# Patient Record
Sex: FEMALE | Race: WHITE | NOT HISPANIC OR LATINO | Employment: UNEMPLOYED | URBAN - METROPOLITAN AREA
[De-identification: names, ages, dates, MRNs, and addresses within clinical notes are randomized per-mention and may not be internally consistent; named-entity substitution may affect disease eponyms.]

---

## 2017-01-02 ENCOUNTER — TELEPHONE (OUTPATIENT)
Dept: PSYCHIATRY | Facility: CLINIC | Age: 47
End: 2017-01-02

## 2017-01-02 NOTE — TELEPHONE ENCOUNTER
I called Karishma Kimball back to discuss process of admission to inpatient CD treatment. She denied SI/HI, stated she was currently on the phone with CD intake. I advised her to cancel her upcoming appointment with Dr. Jones if she was going to be inpatient.

## 2017-01-05 ENCOUNTER — HOSPITAL ENCOUNTER (OUTPATIENT)
Dept: BEHAVIORAL HEALTH | Facility: CLINIC | Age: 47
Discharge: HOME | End: 2017-01-05
Attending: SOCIAL WORKER | Admitting: SOCIAL WORKER
Payer: COMMERCIAL

## 2017-01-05 VITALS
HEART RATE: 101 BPM | DIASTOLIC BLOOD PRESSURE: 107 MMHG | SYSTOLIC BLOOD PRESSURE: 145 MMHG | BODY MASS INDEX: 20.66 KG/M2 | HEIGHT: 64 IN | WEIGHT: 121 LBS

## 2017-01-05 PROCEDURE — H0001 ALCOHOL AND/OR DRUG ASSESS: HCPCS

## 2017-01-05 ASSESSMENT — ANXIETY QUESTIONNAIRES
2. NOT BEING ABLE TO STOP OR CONTROL WORRYING: NOT AT ALL
5. BEING SO RESTLESS THAT IT IS HARD TO SIT STILL: NOT AT ALL
6. BECOMING EASILY ANNOYED OR IRRITABLE: NOT AT ALL
4. TROUBLE RELAXING: SEVERAL DAYS
1. FEELING NERVOUS, ANXIOUS, OR ON EDGE: SEVERAL DAYS
7. FEELING AFRAID AS IF SOMETHING AWFUL MIGHT HAPPEN: NOT AT ALL
GAD7 TOTAL SCORE: 2
3. WORRYING TOO MUCH ABOUT DIFFERENT THINGS: NOT AT ALL

## 2017-01-05 ASSESSMENT — PAIN SCALES - GENERAL: PAINLEVEL: NO PAIN (0)
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
## 2017-01-06 ASSESSMENT — PATIENT HEALTH QUESTIONNAIRE - PHQ9: SUM OF ALL RESPONSES TO PHQ QUESTIONS 1-9: 6

## 2017-01-06 ASSESSMENT — ANXIETY QUESTIONNAIRES: GAD7 TOTAL SCORE: 2
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
## 2017-02-17 ENCOUNTER — OFFICE VISIT (OUTPATIENT)
Dept: FAMILY MEDICINE | Facility: CLINIC | Age: 47
End: 2017-02-17
Payer: COMMERCIAL

## 2017-02-17 VITALS
BODY MASS INDEX: 22.02 KG/M2 | DIASTOLIC BLOOD PRESSURE: 80 MMHG | SYSTOLIC BLOOD PRESSURE: 111 MMHG | HEIGHT: 64 IN | TEMPERATURE: 98 F | HEART RATE: 81 BPM | WEIGHT: 129 LBS

## 2017-02-17 DIAGNOSIS — G47.00 INSOMNIA, UNSPECIFIED TYPE: Primary | ICD-10-CM

## 2017-02-17 DIAGNOSIS — Z97.5 IUD (INTRAUTERINE DEVICE) IN PLACE: ICD-10-CM

## 2017-02-17 DIAGNOSIS — F10.21 ALCOHOLISM IN RECOVERY (H): ICD-10-CM

## 2017-02-17 DIAGNOSIS — H69.90 DYSFUNCTION OF EUSTACHIAN TUBE, UNSPECIFIED LATERALITY: ICD-10-CM

## 2017-02-17 PROCEDURE — 99214 OFFICE O/P EST MOD 30 MIN: CPT | Performed by: PHYSICIAN ASSISTANT

## 2017-02-17 RX ORDER — QUETIAPINE FUMARATE 25 MG/1
25 TABLET, FILM COATED ORAL DAILY
COMMUNITY
Start: 2017-02-04 | End: 2017-02-17 | Stop reason: ALTCHOICE

## 2017-02-17 RX ORDER — TRIAMCINOLONE ACETONIDE 55 UG/1
1-2 SPRAY, METERED NASAL DAILY
Qty: 1 BOTTLE | Refills: 11 | Status: SHIPPED | OUTPATIENT
Start: 2017-02-17 | End: 2017-08-17

## 2017-02-17 NOTE — PROGRESS NOTES
"  SUBJECTIVE:                                                    Karishma Kimball is a 46 year old female who presents to clinic today for the following health issues:      * Was at [facility] for alcoholism,  was recently discharged.  Was prescribed a medication while there and is to have a blood test done to check levels.      Prescribed seroquel.  Only falls asleep if eats with the seroquel 25 mg.  This at 11 pm.  And doesn't feel like in a quality sleep and is hard to wake up.  Also doesn't feel rested.  Notes lifelong history of poor sleep.  Past meds - trazodone (hung over and didn't put to sleep), atarax.  Per recall thinks ambien didn't work.  Thinks was on doxepin in [facility] (felt awful).  Never tried elavil, lunesta.  Notes temazepam always worked. Would like to go back to this.  Did see sleep specialist in past, states had normal sleep study but this was on a night that she had taken her temazepam, and notes Dr Jones really wanted a repeat sleep study, so would like to see sleep specialist again.    Chronic ETD.  Has tried wax removal.    Sobriety - doing well so far.  Hasn't fully figured which AA meetings to attend, has list and is trying out several.  Optimistic.  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    Overdue on pap, and has mirena due for replacement by May.    Problem list and histories reviewed & adjusted, as indicated.  Additional history: as documented    Problem list, Medication list, Allergies, and Medical/Social/Surgical histories reviewed in [facility] and updated as appropriate.    ROS:  Constitutional, HEENT, cardiovascular, pulmonary, GI, , musculoskeletal, neuro, skin, endocrine and psych systems are negative, except as otherwise noted.    OBJECTIVE:                                                    /80 (BP Location: Right arm, Patient Position: Chair, Cuff Size: Adult Regular)  Pulse 81  Temp 98  F (36.7  C) (Tympanic)  Ht 5' 4\" (1.626 m)  Wt 129 lb (58.5 kg)  BMI 22.14 kg/m2  Body mass " index is 22.14 kg/(m^2).  GENERAL: healthy, alert and no distress  PSYCH: mentation appears normal, affect normal/bright       ASSESSMENT/PLAN:                                                        ICD-10-CM    1. Insomnia, unspecified type G47.00 SLEEP EVALUATION & MANAGEMENT REFERRAL - ADULT     amitriptyline (ELAVIL) 25 MG tablet   2. Alcoholism in recovery (H) F10.20 SLEEP EVALUATION & MANAGEMENT REFERRAL - ADULT   3. Dysfunction of eustachian tube, unspecified laterality H69.80 triamcinolone (NASACORT AQ) 55 MCG/ACT Inhaler   4. IUD (intrauterine device) in place Z97.5 OB/GYN REFERRAL     Patient Instructions   (G47.00) Insomnia, unspecified type  (primary encounter diagnosis)  Comment: since seroquel not working well, try amitriptyline instead.  Decided to set up appt with sleep specialist as well.  In meanwhile, call me if you want back on the seroquel.  Plan: SLEEP EVALUATION & MANAGEMENT REFERRAL - ADULT,        amitriptyline (ELAVIL) 25 MG tablet    (F10.20) Alcoholism in recovery (H)  Plan: keep working on finding all your support meetings    (H69.80) Dysfunction of eustachian tube, unspecified laterality  Comment: need to try this for several weeks  Plan: triamcinolone (NASACORT AQ) 55 MCG/ACT Inhaler    (Z97.5) IUD (intrauterine device) in place  Comment: overdue on pap.  Set up with GYN for physical and IUD replacement  Plan: OB/GYN REFERRAL      Call if questions          Dara Pope PA-C  [facility]

## 2017-02-17 NOTE — MR AVS SNAPSHOT
After Visit Summary   2/17/2017    Karishma Kimball    MRN: [id number]           Patient Information     Date Of Birth          1970        Visit Information        Provider Department      2/17/2017 8:40 AM Dara Pope PA-C [facility]        Today's Diagnoses     Insomnia, unspecified type    -  1    Alcoholism in recovery (H)        Dysfunction of eustachian tube, unspecified laterality        IUD (intrauterine device) in place          Care Instructions    (G47.00) Insomnia, unspecified type  (primary encounter diagnosis)  Comment: since seroquel not working well, try amitriptyline instead.  Decided to set up appt with sleep specialist as well.  In meanwhile, call me if you want back on the seroquel.  Plan: SLEEP EVALUATION & MANAGEMENT REFERRAL - ADULT,        amitriptyline (ELAVIL) 25 MG tablet    (F10.20) Alcoholism in recovery (H)  Plan: keep working on finding all your support meetings    (H69.80) Dysfunction of eustachian tube, unspecified laterality  Comment: need to try this for several weeks  Plan: triamcinolone (NASACORT AQ) 55 MCG/ACT Inhaler    (Z97.5) IUD (intrauterine device) in place  Comment: overdue on pap.  Set up with GYN for physical and IUD replacement  Plan: OB/GYN REFERRAL      Call if questions            Follow-ups after your visit        Additional Services     OB/GYN REFERRAL       Your provider has referred you to:  [facility]: [facility] [phone]   [email or web address]    Please be aware that coverage of these services is subject to the terms and limitations of your health insurance plan.  Call member services at your health plan with any benefit or coverage questions.      Please bring the following with you to your appointment:    (1) Any X-Rays, CTs or MRIs which have been performed.  Contact the facility where they were done to arrange for  prior to your scheduled appointment.   (2)  "List of current medications   (3) This referral request   (4) Any documents/labs given to you for this referral            SLEEP EVALUATION & MANAGEMENT REFERRAL - ADULT       Please be aware that coverage of these services is subject to the terms and limitations of your health insurance plan.  Call member services at your health plan with any benefit or coverage questions.      Please bring the following to your appointment:    >>   List of current medications   >>   This referral request   >>   Any documents/labs given to you for this referral    [facility] / Lab   [phone] (Age 2 and up)                  Future tests that were ordered for you today     Open Future Orders        Priority Expected Expires Ordered    SLEEP EVALUATION & MANAGEMENT REFERRAL - ADULT Routine  2/17/2018 2/17/2017            Who to contact     If you have questions or need follow up information about today's clinic visit or your schedule please contact [facility] directly at [phone].  Normal or non-critical lab and imaging results will be communicated to you by MyChart, letter or phone within 4 business days after the clinic has received the results. If you do not hear from us within 7 days, please contact the clinic through MyChart or phone. If you have a critical or abnormal lab result, we will notify you by phone as soon as possible.  Submit refill requests through [employer] or call your pharmacy and they will forward the refill request to us. Please allow 3 business days for your refill to be completed.          Additional Information About Your Visit        [employer] Information     [employer] lets you send messages to your doctor, view your test results, renew your prescriptions, schedule appointments and more. To sign up, go to [email or web address] . Click on \"Log in\" on the left side of the screen, which will take you to the Welcome page. Then click on \"Sign up Now\" on the right side " "of the page.     You will be asked to enter the access code listed below, as well as some personal information. Please follow the directions to create your username and password.     Your access code is: [email or web address]  Expires: 2017  9:29 AM     Your access code will  in 90 days. If you need help or a new code, please call your [place] clinic or [phone].        Care EveryWhere ID     This is your Care EveryWhere ID. This could be used by other organizations to access your [place] medical records  [id number]        Your Vitals Were     Pulse Temperature Height BMI (Body Mass Index)          81 98  F (36.7  C) (Tympanic) 5' 4\" (1.626 m) 22.14 kg/m2         Blood Pressure from Last 3 Encounters:   17 111/80   16 (!) 140/97   16 122/80    Weight from Last 3 Encounters:   17 129 lb (58.5 kg)   16 137 lb (62.1 kg)   16 149 lb 6.4 oz (67.8 kg)              We Performed the Following     OB/GYN REFERRAL          Today's Medication Changes          These changes are accurate as of: 17  9:29 AM.  If you have any questions, ask your nurse or doctor.               Start taking these medicines.        Dose/Directions    amitriptyline 25 MG tablet   Commonly known as:  ELAVIL   Used for:  Insomnia, unspecified type   Started by:  Dara Pope PA-C        Dose:  12.5-100 mg   Take 0.5-4 tablets (12.5-100 mg) by mouth At Bedtime   Quantity:  120 tablet   Refills:  1       triamcinolone 55 MCG/ACT Inhaler   Commonly known as:  NASACORT AQ   Used for:  Dysfunction of eustachian tube, unspecified laterality   Started by:  Dara Pope PA-C        Dose:  1-2 spray   Spray 1-2 sprays into both nostrils daily To help pressure in ears.   Quantity:  1 Bottle   Refills:  11         Stop taking these medicines if you haven't already. Please contact your care team if you have questions.     PROBIOTIC DAILY PO   Stopped by:  Dara Pope PA-C           " QUEtiapine 25 MG tablet   Commonly known as:  SEROquel   Stopped by:  Dara Pope PA-C           temazepam 15 MG capsule   Commonly known as:  RESTORIL   Stopped by:  Dara Pope PA-C           VITAMIN C PO   Stopped by:  Dara Pope PA-C           Vitamin D3 3000 UNITS Tabs   Stopped by:  Dara Pope PA-C           ZINC PO   Stopped by:  Dara Pope PA-C                Where to get your medicines      These medications were sent to [place] [id number]     Phone:  [phone]     amitriptyline 25 MG tablet    triamcinolone 55 MCG/ACT Inhaler                Primary Care Provider Office Phone # Fax #    Shantel Reynolds [phone]       [address] [address]        Thank you!     Thank you for choosing [facility]  for your care. Our goal is always to provide you with excellent care. Hearing back from our patients is one way we can continue to improve our services. Please take a few minutes to complete the written survey that you may receive in the mail after your visit with us. Thank you!             Your Updated Medication List - Protect others around you: Learn how to safely use, store and throw away your medicines at www.disposemymeds.org.          This list is accurate as of: 2/17/17  9:29 AM.  Always use your most recent med list.                   Brand Name Dispense Instructions for use    ** PATIENT ALERT **      MAXIMUM Ibuprofen FROM ALL SOURCES SHOULD NOT EXCEED 4000MG IN 24 HOURS       amitriptyline 25 MG tablet    ELAVIL    120 tablet    Take 0.5-4 tablets (12.5-100 mg) by mouth At Bedtime       triamcinolone 55 MCG/ACT Inhaler    NASACORT AQ    1 Bottle    Spray 1-2 sprays into both nostrils daily To help pressure in ears.

## 2017-02-17 NOTE — NURSING NOTE
"Chief Complaint   Patient presents with     Alcohol Problem     /80 (BP Location: Right arm, Patient Position: Chair, Cuff Size: Adult Regular)  Pulse 81  Temp 98  F (36.7  C) (Tympanic)  Ht 5' 4\" (1.626 m)  Wt 129 lb (58.5 kg)  BMI 22.14 kg/m2 Estimated body mass index is 22.14 kg/(m^2) as calculated from the following:    Height as of this encounter: 5' 4\" (1.626 m).    Weight as of this encounter: 129 lb (58.5 kg).  bp completed using cuff size: regular      Health Maintenance that is potentially due pending provider review:  NONE    Abida ANGELA MA      "

## 2017-02-17 NOTE — PATIENT INSTRUCTIONS
(G47.00) Insomnia, unspecified type  (primary encounter diagnosis)  Comment: since seroquel not working well, try amitriptyline instead.  Decided to set up appt with sleep specialist as well.  In meanwhile, call me if you want back on the seroquel.  Plan: SLEEP EVALUATION & MANAGEMENT REFERRAL - ADULT,        amitriptyline (ELAVIL) 25 MG tablet    (F10.20) Alcoholism in recovery (H)  Plan: keep working on finding all your support meetings    (H69.80) Dysfunction of eustachian tube, unspecified laterality  Comment: need to try this for several weeks  Plan: triamcinolone (NASACORT AQ) 55 MCG/ACT Inhaler    (Z97.5) IUD (intrauterine device) in place  Comment: overdue on pap.  Set up with GYN for physical and IUD replacement  Plan: OB/GYN REFERRAL      Call if questions

## 2017-02-23 ENCOUNTER — HOSPITAL ENCOUNTER (OUTPATIENT)
Dept: MAMMOGRAPHY | Facility: CLINIC | Age: 47
Discharge: HOME | End: 2017-02-23
Attending: PHYSICIAN ASSISTANT | Admitting: PHYSICIAN ASSISTANT
Payer: COMMERCIAL

## 2017-02-23 DIAGNOSIS — Z12.31 VISIT FOR SCREENING MAMMOGRAM: ICD-10-CM

## 2017-02-23 PROCEDURE — 77063 BREAST TOMOSYNTHESIS BI: CPT

## 2017-04-03 ENCOUNTER — OFFICE VISIT (OUTPATIENT)
Dept: FAMILY MEDICINE | Facility: CLINIC | Age: 47
End: 2017-04-03
Payer: COMMERCIAL

## 2017-04-03 VITALS
TEMPERATURE: 98 F | WEIGHT: 130 LBS | DIASTOLIC BLOOD PRESSURE: 81 MMHG | HEART RATE: 110 BPM | BODY MASS INDEX: 22.2 KG/M2 | SYSTOLIC BLOOD PRESSURE: 125 MMHG | HEIGHT: 64 IN

## 2017-04-03 DIAGNOSIS — F10.21 ALCOHOLISM IN RECOVERY (H): ICD-10-CM

## 2017-04-03 DIAGNOSIS — S39.012A BACK STRAIN, INITIAL ENCOUNTER: Primary | ICD-10-CM

## 2017-04-03 DIAGNOSIS — G47.00 PERSISTENT INSOMNIA: Primary | Chronic | ICD-10-CM

## 2017-04-03 DIAGNOSIS — F33.0 MAJOR DEPRESSIVE DISORDER, RECURRENT EPISODE, MILD (H): Chronic | ICD-10-CM

## 2017-04-03 PROCEDURE — 99214 OFFICE O/P EST MOD 30 MIN: CPT | Performed by: PHYSICIAN ASSISTANT

## 2017-04-03 PROCEDURE — 99212 OFFICE O/P EST SF 10 MIN: CPT | Performed by: PHYSICIAN ASSISTANT

## 2017-04-03 RX ORDER — ESZOPICLONE 1 MG/1
1-3 TABLET, FILM COATED ORAL
Qty: 30 TABLET | Refills: 0 | Status: SHIPPED | OUTPATIENT
Start: 2017-04-03 | End: 2017-04-17

## 2017-04-03 NOTE — Clinical Note
Please populate vitals from non-work comp visit same day, refresh vitals section in objective documentation, and close chart

## 2017-04-03 NOTE — NURSING NOTE
"Chief Complaint   Patient presents with     Recheck Medication       Initial /81 (BP Location: Right arm, Patient Position: Chair, Cuff Size: Adult Regular)  Pulse 110  Temp 98  F (36.7  C) (Tympanic)  Ht 5' 4\" (1.626 m)  Wt 130 lb (59 kg)  BMI 22.31 kg/m2 Estimated body mass index is 22.31 kg/(m^2) as calculated from the following:    Height as of this encounter: 5' 4\" (1.626 m).    Weight as of this encounter: 130 lb (59 kg).  Medication Reconciliation: complete    Health Maintenance that is potentially due pending provider review:  NONE    Abida ANGELA MA        "

## 2017-04-03 NOTE — PROGRESS NOTES
"  SUBJECTIVE:                                                    Karishma Kimball is a 46 year old female who presents to clinic today for the following health issues:      Medication Followup of Amitriptyline    Taking Medication as prescribed: yes    Side Effects:  Not sure if it is a side effect, but feels \"hung over\" in the morning    Medication Helping Symptoms:  \" It's better than nothing\"     From my Feb note: \"Prescribed seroquel. Only falls asleep if eats with the seroquel 25 mg. This at 11 pm. And doesn't feel like in a quality sleep and is hard to wake up. Also doesn't feel rested.  Notes lifelong history of poor sleep.  Past meds - trazodone (hung over and didn't put to sleep), atarax. Per recall thinks ambien didn't work. Thinks was on doxepin in [facility] (felt awful).  Never tried elavil, lunesta.  Notes temazepam always worked. Would like to go back to this.  Did see sleep specialist in past, states had normal sleep study but this was on a night that she had taken her temazepam, and notes Dr Jones really wanted a repeat sleep study, so would like to see sleep specialist again.\"    TODAY - Taking elavil 100 mg.  Smaller doses didn't work.  Sometimes up until 3 am.  But if takes earlier, seems to sometimes wears off early in night.    Groggy all day.  Feels med is better than nothing, but not good.  Long history of insomnia since age 18 or so.  States her insomnia was not taken seriously by providers until age 43.  Still sober.  Averaging >1 AA meeting per day last month.  Mood doing well.  Denies needing any treatment at this time.  Past care with Parminder and Dr Jones.  Has not set up return to sleep specialist yet, disliked last provider.       Problem list and histories reviewed & adjusted, as indicated.  Additional history: as documented    Reviewed and updated as needed this visit by clinical staff       Reviewed and updated as needed this visit by Provider         ROS:  Constitutional, neuro, " "and psych systems are negative, except as otherwise noted.    OBJECTIVE:                                                    /81 (BP Location: Right arm, Patient Position: Chair, Cuff Size: Adult Regular)  Pulse 110  Temp 98  F (36.7  C) (Tympanic)  Ht 5' 4\" (1.626 m)  Wt 130 lb (59 kg)  BMI 22.31 kg/m2  Body mass index is 22.31 kg/(m^2).  GENERAL: healthy, alert and no distress  PSYCH: mentation appears normal, affect normal/bright     ASSESSMENT/PLAN:                                                        ICD-10-CM    1. Persistent insomnia G47.00 SLEEP EVALUATION & MANAGEMENT REFERRAL - ADULT     eszopiclone (LUNESTA) 1 MG TABS tablet   2. Alcoholism in recovery (H) F10.20    3. Major depressive disorder, recurrent episode, mild (H) F33.0      Pap overdue.  Per last visit, patient plans GYN appt by May when mirena due for replacement.  Patient Instructions   Trying different sleep medicine  Call to update me on which dose of lunesta works, or if wish to go back to the amitriptyline  Do still set up with sleep doctor - referral placed again, this time specifically with Dr Blake    Stay sober    Keep monitoring your moods        Dara Pope PA-C  [facility]    "

## 2017-04-03 NOTE — MR AVS SNAPSHOT
After Visit Summary   4/3/2017    Karishma Kimball    MRN: [id number]           Patient Information     Date Of Birth          1970        Visit Information        Provider Department      4/3/2017 6:00 PM Dara Pope PA-C [facility]        Today's Diagnoses     Persistent insomnia    -  1    Alcoholism in recovery (H)          Care Instructions    Trying different sleep medicine  Call to update me on which dose of lunesta works, or if wish to go back to the amitriptyline  Do still set up with sleep doctor - referral placed again, this time specifically with Dr Blake    Stay sober    Keep monitoring your moods          Follow-ups after your visit        Additional Services     SLEEP EVALUATION & MANAGEMENT REFERRAL - ADULT       Please be aware that coverage of these services is subject to the terms and limitations of your health insurance plan.  Call member services at your health plan with any benefit or coverage questions.      Please bring the following to your appointment:    >>   List of current medications   >>   This referral request   >>   Any documents/labs given to you for this referral    [facility]  Ph [phone] (Age 2 and up)                  Your next 10 appointments already scheduled     Apr 13, 2017 11:00 AM CDT   PHYSICAL with Radha Boateng MD   [facility]    [address] [facility] [address] [phone]              Future tests that were ordered for you today     Open Future Orders        Priority Expected Expires Ordered    SLEEP EVALUATION & MANAGEMENT REFERRAL - ADULT Routine  4/3/2018 4/3/2017            Who to contact     If you have questions or need follow up information about today's clinic visit or your schedule please contact [facility] directly at [phone].  Normal or non-critical lab and imaging results will  "be communicated to you by Hell[employer] Mediahart, letter or phone within 4 business days after the clinic has received the results. If you do not hear from us within 7 days, please contact the clinic through Feedsky or phone. If you have a critical or abnormal lab result, we will notify you by phone as soon as possible.  Submit refill requests through Feedsky or call your pharmacy and they will forward the refill request to us. Please allow 3 business days for your refill to be completed.          Additional Information About Your Visit        Feedsky Information     Feedsky lets you send messages to your doctor, view your test results, renew your prescriptions, schedule appointments and more. To sign up, go to [email or web address] . Click on \"Log in\" on the left side of the screen, which will take you to the Welcome page. Then click on \"Sign up Now\" on the right side of the page.     You will be asked to enter the access code listed below, as well as some personal information. Please follow the directions to create your username and password.     Your access code is: [email or web address]  Expires: 2017 10:29 AM     Your access code will  in 90 days. If you need help or a new code, please call your [place] clinic or [phone].        Care EveryWhere ID     This is your Care EveryWhere ID. This could be used by other organizations to access your [place] medical records  [id number]        Your Vitals Were     Pulse Temperature Height BMI (Body Mass Index)          110 98  F (36.7  C) (Tympanic) 5' 4\" (1.626 m) 22.31 kg/m2         Blood Pressure from Last 3 Encounters:   17 125/81   17 111/80   17 (!) 145/107    Weight from Last 3 Encounters:   17 130 lb (59 kg)   17 129 lb (58.5 kg)   17 121 lb (54.9 kg)                 Today's Medication Changes          These changes are accurate as of: 4/3/17  6:24 PM.  If you have any questions, ask your nurse or doctor.               Start taking " these medicines.        Dose/Directions    eszopiclone 1 MG Tabs tablet   Commonly known as:  LUNESTA   Used for:  Persistent insomnia   Started by:  Dara Pope PA-C        Dose:  1-3 mg   Take 1-3 tablets (1-3 mg) by mouth nightly as needed for sleep Start with lowest dose tonight, then if needed 2 mg tomorrow, and if still not sleeping, 3 mg next next.  Update Dara which dose works for refill.   Quantity:  30 tablet   Refills:  0         Stop taking these medicines if you haven't already. Please contact your care team if you have questions.     amitriptyline 25 MG tablet   Commonly known as:  ELAVIL   Stopped by:  Dara Pope PA-C                Where to get your medicines      Some of these will need a paper prescription and others can be bought over the counter.  Ask your nurse if you have questions.     Bring a paper prescription for each of these medications     eszopiclone 1 MG Tabs tablet                Primary Care Provider Office Phone # Fax #    Dara Pope PA-C [phone]       [facility] [address] [place] [address]        Thank you!     Thank you for choosing [facility]  for your care. Our goal is always to provide you with excellent care. Hearing back from our patients is one way we can continue to improve our services. Please take a few minutes to complete the written survey that you may receive in the mail after your visit with us. Thank you!             Your Updated Medication List - Protect others around you: Learn how to safely use, store and throw away your medicines at www.disposemymeds.org.          This list is accurate as of: 4/3/17  6:24 PM.  Always use your most recent med list.                   Brand Name Dispense Instructions for use    ** PATIENT ALERT **      MAXIMUM Ibuprofen FROM ALL SOURCES SHOULD NOT EXCEED 4000MG IN 24 HOURS       eszopiclone 1 MG Tabs tablet    LUNESTA    30 tablet    Take  1-3 tablets (1-3 mg) by mouth nightly as needed for sleep Start with lowest dose tonight, then if needed 2 mg tomorrow, and if still not sleeping, 3 mg next next.  Update Dara which dose works for refill.       triamcinolone 55 MCG/ACT Inhaler    NASACORT AQ    1 Bottle    Spray 1-2 sprays into both nostrils daily To help pressure in ears.

## 2017-04-03 NOTE — PATIENT INSTRUCTIONS
Trying different sleep medicine  Call to update me on which dose of lunesta works, or if wish to go back to the amitriptyline  Do still set up with sleep doctor - referral placed again, this time specifically with Dr Blake    Stay sober    Keep monitoring your moods

## 2017-04-03 NOTE — MR AVS SNAPSHOT
"              After Visit Summary   4/3/2017    Karishma Kimball    MRN: [id number]           Patient Information     Date Of Birth          1970        Visit Information        Provider Department      4/3/2017 6:20 PM Dara Pope PA-C [facility]        Today's Diagnoses     Back strain, initial encounter    -  1      Care Instructions    Stretching  Heat or ice if needed  Acetaminophen or ibuprofen  If not improving in 1-2 weeks, will have you do Physical Therapy         Follow-ups after your visit        Your next 10 appointments already scheduled     Apr 13, 2017 11:00 AM CDT   PHYSICAL with Radha Boateng MD   [facility]    [address] [facility] [address] [phone]              Who to contact     If you have questions or need follow up information about today's clinic visit or your schedule please contact [facility] directly at [phone].  Normal or non-critical lab and imaging results will be communicated to you by Jobuloushart, letter or phone within 4 business days after the clinic has received the results. If you do not hear from us within 7 days, please contact the clinic through Jobuloushart or phone. If you have a critical or abnormal lab result, we will notify you by phone as soon as possible.  Submit refill requests through Wildfire or call your pharmacy and they will forward the refill request to us. Please allow 3 business days for your refill to be completed.          Additional Information About Your Visit        MyChart Information     Wildfire lets you send messages to your doctor, view your test results, renew your prescriptions, schedule appointments and more. To sign up, go to [email or web address] . Click on \"Log in\" on the left side of the screen, which will take you to the Welcome page. Then click on \"Sign up Now\" on the right side of the page.     You will be asked " "to enter the access code listed below, as well as some personal information. Please follow the directions to create your username and password.     Your access code is: [email or web address]  Expires: 2017 10:29 AM     Your access code will  in 90 days. If you need help or a new code, please call your [place] clinic or [phone].        Care EveryWhere ID     This is your Care EveryWhere ID. This could be used by other organizations to access your [place] medical records  [id number]        Your Vitals Were     Pulse Temperature Height BMI (Body Mass Index)          110 98  F (36.7  C) (Tympanic) 5' 4\" (1.626 m) 22.31 kg/m2         Blood Pressure from Last 3 Encounters:   17 125/81   17 125/81   17 111/80    Weight from Last 3 Encounters:   17 130 lb (59 kg)   17 130 lb (59 kg)   17 129 lb (58.5 kg)              Today, you had the following     No orders found for display         Today's Medication Changes          These changes are accurate as of: 4/3/17 11:59 PM.  If you have any questions, ask your nurse or doctor.               Start taking these medicines.        Dose/Directions    eszopiclone 1 MG Tabs tablet   Commonly known as:  LUNESTA   Used for:  Persistent insomnia   Started by:  Dara Pope PA-C        Dose:  1-3 mg   Take 1-3 tablets (1-3 mg) by mouth nightly as needed for sleep Start with lowest dose tonight, then if needed 2 mg tomorrow, and if still not sleeping, 3 mg next next.  Update Dara which dose works for refill.   Quantity:  30 tablet   Refills:  0         Stop taking these medicines if you haven't already. Please contact your care team if you have questions.     amitriptyline 25 MG tablet   Commonly known as:  ELAVIL   Stopped by:  Dara Pope PA-C                Where to get your medicines      Some of these will need a paper prescription and others can be bought over the counter.  Ask your nurse if you have questions.  "    Bring a paper prescription for each of these medications     eszopiclone 1 MG Tabs tablet                Primary Care Provider Office Phone # Fax #    Dara Pope PA-C [phone]       [facility] [address] [facility] [address]        Thank you!     Thank you for choosing [facility]  for your care. Our goal is always to provide you with excellent care. Hearing back from our patients is one way we can continue to improve our services. Please take a few minutes to complete the written survey that you may receive in the mail after your visit with us. Thank you!             Your Updated Medication List - Protect others around you: Learn how to safely use, store and throw away your medicines at www.disposemymeds.org.          This list is accurate as of: 4/3/17 11:59 PM.  Always use your most recent med list.                   Brand Name Dispense Instructions for use    ** PATIENT ALERT **      MAXIMUM Ibuprofen FROM ALL SOURCES SHOULD NOT EXCEED 4000MG IN 24 HOURS       eszopiclone 1 MG Tabs tablet    LUNESTA    30 tablet    Take 1-3 tablets (1-3 mg) by mouth nightly as needed for sleep Start with lowest dose tonight, then if needed 2 mg tomorrow, and if still not sleeping, 3 mg next next.  Update Dara which dose works for refill.       triamcinolone 55 MCG/ACT Inhaler    NASACORT AQ    1 Bottle    Spray 1-2 sprays into both nostrils daily To help pressure in ears.

## 2017-04-04 NOTE — PROGRESS NOTES
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  Focal pain, no weakness or radiating pain.    No history of prev back troubles aside from sacroiliac joint trouble once while pregnant.  Is lower left back.  Feels tight.      Problem list and histories reviewed & adjusted, as indicated.  Additional history: as documented    Reviewed and updated as needed this visit by clinical staff  Allergies  Meds  Problems       Reviewed and updated as needed this visit by Provider  Allergies  Meds  Problems       ROS:  Constitutional, musculoskeletal, neuro, systems are negative, except as otherwise noted.    OBJECTIVE:                                                    /81  Pulse 110  Temp 98  F (36.7  C) (Tympanic)  Ht 5' 4\" (1.626 m)  Wt 130 lb (59 kg)  BMI 22.31 kg/m2  Body mass index is 22.31 kg/(m^2).  GENERAL: healthy, alert and no distress  Back: Spine not tender to palpation.  Tender lower left over iliac and triangularly down towards coccyx  NEURO: Gait normal.      ASSESSMENT/PLAN:                                                        ICD-10-CM    1. Back strain, initial encounter S39.012A        Patient Instructions   Stretching  Heat or ice if needed  Acetaminophen or ibuprofen  If not improving in 1-2 weeks, will have you do Physical Therapy       Dara Pope PA-C  [facility]    "

## 2017-04-04 NOTE — PATIENT INSTRUCTIONS
Stretching  Heat or ice if needed  Acetaminophen or ibuprofen  If not improving in 1-2 weeks, will have you do Physical Therapy

## 2017-04-06 VITALS
HEIGHT: 64 IN | HEART RATE: 110 BPM | BODY MASS INDEX: 22.2 KG/M2 | WEIGHT: 130 LBS | SYSTOLIC BLOOD PRESSURE: 125 MMHG | DIASTOLIC BLOOD PRESSURE: 81 MMHG | TEMPERATURE: 98 F

## 2017-04-06 NOTE — NURSING NOTE
"Chief Complaint   Patient presents with     Work Comp       Initial /81  Pulse 110  Temp 98  F (36.7  C) (Tympanic)  Ht 5' 4\" (1.626 m)  Wt 130 lb (59 kg)  BMI 22.31 kg/m2 Estimated body mass index is 22.31 kg/(m^2) as calculated from the following:    Height as of this encounter: 5' 4\" (1.626 m).    Weight as of this encounter: 130 lb (59 kg).  Medication Reconciliation: complete    Health Maintenance that is potentially due pending provider review:  NONE    Abida ANGELA MA        "

## 2017-04-13 ENCOUNTER — OFFICE VISIT (OUTPATIENT)
Dept: OBGYN | Facility: CLINIC | Age: 47
End: 2017-04-13
Payer: COMMERCIAL

## 2017-04-13 VITALS
SYSTOLIC BLOOD PRESSURE: 107 MMHG | WEIGHT: 130 LBS | HEIGHT: 64 IN | HEART RATE: 83 BPM | DIASTOLIC BLOOD PRESSURE: 75 MMHG | BODY MASS INDEX: 22.2 KG/M2

## 2017-04-13 DIAGNOSIS — Z30.433 ENCOUNTER FOR IUD REMOVAL AND REINSERTION: ICD-10-CM

## 2017-04-13 DIAGNOSIS — Z97.5 IUD (INTRAUTERINE DEVICE) IN PLACE: ICD-10-CM

## 2017-04-13 DIAGNOSIS — Z00.00 ROUTINE GENERAL MEDICAL EXAMINATION AT A HEALTH CARE FACILITY: Primary | ICD-10-CM

## 2017-04-13 PROCEDURE — 87624 HPV HI-RISK TYP POOLED RSLT: CPT | Performed by: OBSTETRICS & GYNECOLOGY

## 2017-04-13 PROCEDURE — G0145 SCR C/V CYTO,THINLAYER,RESCR: HCPCS | Performed by: OBSTETRICS & GYNECOLOGY

## 2017-04-13 PROCEDURE — 58300 INSERT INTRAUTERINE DEVICE: CPT | Performed by: OBSTETRICS & GYNECOLOGY

## 2017-04-13 PROCEDURE — 99386 PREV VISIT NEW AGE 40-64: CPT | Mod: 25 | Performed by: OBSTETRICS & GYNECOLOGY

## 2017-04-13 PROCEDURE — 76830 TRANSVAGINAL US NON-OB: CPT | Performed by: OBSTETRICS & GYNECOLOGY

## 2017-04-13 PROCEDURE — 58301 REMOVE INTRAUTERINE DEVICE: CPT | Performed by: OBSTETRICS & GYNECOLOGY
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
## 2017-04-13 NOTE — MR AVS SNAPSHOT
After Visit Summary   4/13/2017    Karishma Kimball    MRN: [id number]           Patient Information     Date Of Birth          1970        Visit Information        Provider Department      4/13/2017 11:00 AM Radha Boateng MD [facility]        Today's Diagnoses     Routine general medical examination at a health care facility    -  1    Encounter for IUD removal and reinsertion          Care Instructions      Preventive Health Recommendations  Female Ages 40 to 49    Yearly exam:     See your health care provider every year in order to  1. Review health changes.   2. Discuss preventive care.    3. Review your medicines if your doctor prescribed any.      Get a Pap test every three years (unless you have an abnormal result and your provider advises testing more often).      If you get Pap tests with HPV test, you only need to test every 5 years, unless you have an abnormal result. You do not need a Pap test if your uterus was removed (hysterectomy) and you have not had cancer.      You should be tested each year for STDs (sexually transmitted diseases), if you're at risk.       Ask your doctor if you should have a mammogram.      Have a colonoscopy (test for colon cancer) if someone in your family has had colon cancer or polyps before age 50.       Have a cholesterol test every 5 years.       Have a diabetes test (fasting glucose) after age 45. If you are at risk for diabetes, you should have this test every 3 years.    Shots: Get a flu shot each year. Get a tetanus shot every 10 years.     Nutrition:     Eat at least 5 servings of fruits and vegetables each day.    Eat whole-grain bread, whole-wheat pasta and brown rice instead of white grains and rice.    Talk to your provider about Calcium and Vitamin D.     Lifestyle    Exercise at least 150 minutes a week (an average of 30 minutes a day, 5 days a week). This will help you control your weight and prevent  "disease.    Limit alcohol to one drink per day.    No smoking.     Wear sunscreen to prevent skin cancer.    See your dentist every six months for an exam and cleaning.        Follow-ups after your visit        Who to contact     If you have questions or need follow up information about today's clinic visit or your schedule please contact [facility] directly at [phone].  Normal or non-critical lab and imaging results will be communicated to you by MyChart, letter or phone within 4 business days after the clinic has received the results. If you do not hear from us within 7 days, please contact the clinic through [employer] or phone. If you have a critical or abnormal lab result, we will notify you by phone as soon as possible.  Submit refill requests through [employer] or call your pharmacy and they will forward the refill request to us. Please allow 3 business days for your refill to be completed.          Additional Information About Your Visit        [employer] Information     [employer] lets you send messages to your doctor, view your test results, renew your prescriptions, schedule appointments and more. To sign up, go to [email or web address] . Click on \"Log in\" on the left side of the screen, which will take you to the Welcome page. Then click on \"Sign up Now\" on the right side of the page.     You will be asked to enter the access code listed below, as well as some personal information. Please follow the directions to create your username and password.     Your access code is: [email or web address]  Expires: 2017 10:29 AM     Your access code will  in 90 days. If you need help or a new code, please call your [facility] clinic or [phone].        Care EveryWhere ID     This is your Care EveryWhere ID. This could be used by other organizations to access your [facility] medical records  [id number]        Your Vitals Were     Pulse Height Breastfeeding? BMI (Body Mass Index)          83 5' 4\" (1.626 " m) No 22.31 kg/m2         Blood Pressure from Last 3 Encounters:   04/13/17 107/75   04/03/17 125/81   04/03/17 125/81    Weight from Last 3 Encounters:   04/13/17 130 lb (59 kg)   04/03/17 130 lb (59 kg)   04/03/17 130 lb (59 kg)              We Performed the Following     HPV High Risk Types DNA Cervical     INSERTION INTRAUTERINE DEVICE     Pap imaged thin layer screen with HPV - recommended age 30 - 65 years (select HPV order below)     REMOVE INTRAUTERINE DEVICE     US Pelvic Limited Portable          Today's Medication Changes          These changes are accurate as of: 4/13/17 11:56 AM.  If you have any questions, ask your nurse or doctor.               Start taking these medicines.        Dose/Directions    levonorgestrel 20 MCG/24HR IUD   Commonly known as:  MIRENA   Used for:  Encounter for IUD removal and reinsertion   Started by:  Radha Boateng MD        Dose:  1 each   1 each (20 mcg) by Intrauterine route once for 1 dose   Quantity:  1 each   Refills:  0            Where to get your medicines      Some of these will need a paper prescription and others can be bought over the counter.  Ask your nurse if you have questions.     You don't need a prescription for these medications     levonorgestrel 20 MCG/24HR IUD                Primary Care Provider Office Phone # Fax #    Dara Pope PA-C [phone]       [facility] [address] [place] [address]        Thank you!     Thank you for choosing [facility]  for your care. Our goal is always to provide you with excellent care. Hearing back from our patients is one way we can continue to improve our services. Please take a few minutes to complete the written survey that you may receive in the mail after your visit with us. Thank you!             Your Updated Medication List - Protect others around you: Learn how to safely use, store and throw away your medicines at  www.disposemymeds.org.          This list is accurate as of: 4/13/17 11:56 AM.  Always use your most recent med list.                   Brand Name Dispense Instructions for use    ** PATIENT ALERT **      MAXIMUM Ibuprofen FROM ALL SOURCES SHOULD NOT EXCEED 4000MG IN 24 HOURS       eszopiclone 1 MG Tabs tablet    LUNESTA    30 tablet    Take 1-3 tablets (1-3 mg) by mouth nightly as needed for sleep Start with lowest dose tonight, then if needed 2 mg tomorrow, and if still not sleeping, 3 mg next next.  Update Dara which dose works for refill.       levonorgestrel 20 MCG/24HR IUD    MIRENA    1 each    1 each (20 mcg) by Intrauterine route once for 1 dose       triamcinolone 55 MCG/ACT Inhaler    NASACORT AQ    1 Bottle    Spray 1-2 sprays into both nostrils daily To help pressure in ears.

## 2017-04-13 NOTE — NURSING NOTE
"Initial /75 (BP Location: Left arm, Patient Position: Chair, Cuff Size: Adult Regular)  Pulse 83  Ht 5' 4\" (1.626 m)  Wt 130 lb (59 kg)  Breastfeeding? No  BMI 22.31 kg/m2 Estimated body mass index is 22.31 kg/(m^2) as calculated from the following:    Height as of this encounter: 5' 4\" (1.626 m).    Weight as of this encounter: 130 lb (59 kg). .    Katharina Holman    "

## 2017-04-13 NOTE — LETTER
April 21, 2017    Karishma Kimball  [address] [facility] [place] [address]    Dear Karishma,  We are happy to inform you that your PAP smear result from 4/13/17 is normal.  We are now able to do a follow up test on PAP smears. The DNA test is for HPV (Human Papilloma Virus). Cervical cancer is closely linked with certain types of HPV. Your result showed no evidence of high risk HPV.  Therefore we recommend you return in 1-3 years for your next pap smear and HPV test.  You will still need to return to the clinic every year for an annual exam and other preventive tests.  Please contact the clinic at [phone] with any questions.  Sincerely,    Radha Boateng MD/kranthi

## 2017-04-17 DIAGNOSIS — G47.00 PERSISTENT INSOMNIA: Chronic | ICD-10-CM

## 2017-04-17 LAB
COPATH REPORT: NORMAL
PAP: NORMAL

## 2017-04-17 RX ORDER — ESZOPICLONE 3 MG/1
3 TABLET, FILM COATED ORAL
Qty: 30 TABLET | Refills: 1 | Status: SHIPPED | OUTPATIENT
Start: 2017-04-17 | End: 2017-06-05

## 2017-04-17 NOTE — TELEPHONE ENCOUNTER
Dara Pope, relayed your message below, patient will try the melatonin while on the lunesta. Patient is needing a refill for Lunesta and wants it for 3mg until she sees sleep specialist, medication is pended,  thank you.  Esha

## 2017-04-17 NOTE — TELEPHONE ENCOUNTER
Dara Pope, called the patient back and she has informed this nurse that she took the elavil last night as she had a bad night this week and worried that the Lunesta was not helping her. Had been taking the Lunesta since 4-3-17 and had average of 5 hours of sleep and gave up caffeine as well.  Reports sleep not consistent however and wondering why some nights she is getting only 3 hours per night. Patient reports overall the elavil has been better for her but wants more direction. Told the patient that the elavil was discontinued but patient states she had filled before seeing provider so had some left. Patient is wondering your recommendation which to take and how long to try. Please advise.  Esha

## 2017-04-17 NOTE — TELEPHONE ENCOUNTER
Reason for Call:  Medication questions    Detailed comments: Karishma has Rx's for both Elavil and Lunesta to take at [facility]. She doesn't take both but one or the other.  She takes 100 mg of the Elavil or 3mg of the Lunesta. She wonders if 100 mg is the max dose she can take of the Elavil.    Phone Number Patient can be reached at: Home number on file [phone] (home)    Best Time: anytime    Can we leave a detailed message on this number? YES    Call taken on 4/17/2017 at 10:10 AM by Sonya Diaz

## 2017-04-17 NOTE — TELEPHONE ENCOUNTER
None of the sleep meds seem to do very well for her.    We had dc elavil since was seeming to cause groggy all day and still not prolonged sleep at night.  Since groggy all day, I don't want to try higher dose than 100.  She could try 6 mg melatonin at bedtime or 1-2 hrs before bedtime consistently, to see if this would also help sleep while she tries the lunesta.  Does she want refill for 3 mg strength until she sees sleep specialist on 5/4?

## 2017-04-18 LAB
FINAL DIAGNOSIS: NORMAL
HPV HR 12 DNA CVX QL NAA+PROBE: NEGATIVE
HPV16 DNA SPEC QL NAA+PROBE: NEGATIVE
HPV18 DNA SPEC QL NAA+PROBE: NEGATIVE
SPECIMEN DESCRIPTION: NORMAL

## 2017-05-04 ENCOUNTER — TELEPHONE (OUTPATIENT)
Dept: SLEEP MEDICINE | Facility: CLINIC | Age: 47
End: 2017-05-04

## 2017-05-04 ENCOUNTER — OFFICE VISIT (OUTPATIENT)
Dept: SLEEP MEDICINE | Facility: CLINIC | Age: 47
End: 2017-05-04
Payer: COMMERCIAL

## 2017-05-04 VITALS
HEIGHT: 64 IN | WEIGHT: 132 LBS | SYSTOLIC BLOOD PRESSURE: 122 MMHG | BODY MASS INDEX: 22.53 KG/M2 | OXYGEN SATURATION: 98 % | HEART RATE: 101 BPM | DIASTOLIC BLOOD PRESSURE: 89 MMHG

## 2017-05-04 DIAGNOSIS — F41.1 GENERALIZED ANXIETY DISORDER: Chronic | ICD-10-CM

## 2017-05-04 DIAGNOSIS — F51.01 IDIOPATHIC INSOMNIA: Primary | ICD-10-CM

## 2017-05-04 DIAGNOSIS — F33.0 MAJOR DEPRESSIVE DISORDER, RECURRENT EPISODE, MILD (H): Chronic | ICD-10-CM

## 2017-05-04 DIAGNOSIS — G47.00 PERSISTENT INSOMNIA: Primary | Chronic | ICD-10-CM

## 2017-05-04 PROCEDURE — 99214 OFFICE O/P EST MOD 30 MIN: CPT | Performed by: FAMILY MEDICINE

## 2017-05-04 RX ORDER — AMITRIPTYLINE HYDROCHLORIDE 50 MG/1
TABLET ORAL
Qty: 90 TABLET | Refills: 11 | Status: SHIPPED | OUTPATIENT
Start: 2017-05-04 | End: 2018-07-02

## 2017-05-04 NOTE — PATIENT INSTRUCTIONS
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
## 2017-05-04 NOTE — MR AVS SNAPSHOT
After Visit Summary   5/4/2017    Karishma Kimball    MRN: [id number]           Patient Information     Date Of Birth          1970        Visit Information        Provider Department      5/4/2017 11:00 AM Prakash Blake MD [facility]        Today's Diagnoses     Idiopathic insomnia    -  1      Care Instructions      Your BMI is Body mass index is 22.65 kg/(m^2).  Weight management is a personal decision.  If you are interested in exploring weight loss strategies, the following discussion covers the approaches that may be successful. Body mass index (BMI) is one way to tell whether you are at a healthy weight, overweight, or obese. It measures your weight in relation to your height.  A BMI of 18.5 to 24.9 is in the healthy range. A person with a BMI of 25 to 29.9 is considered overweight, and someone with a BMI of 30 or greater is considered obese. More than two-thirds of American adults are considered overweight or obese.  Being overweight or obese increases the risk for further weight gain. Excess weight may lead to heart disease and diabetes.  Creating and following plans for healthy eating and physical activity may help you improve your health.  Weight control is part of healthy lifestyle and includes exercise, emotional health, and healthy eating habits. Careful eating habits lifelong are the mainstay of weight control. Though there are significant health benefits from weight loss, long-term weight loss with diet alone may be very difficult to achieve- studies show long-term success with dietary management in less than 10% of people. Attaining a healthy weight may be especially difficult to achieve in those with severe obesity. In some cases, medications, devices and surgical management might be considered.  What can you do?  If you are overweight or obese and are interested in methods for weight loss, you should discuss this with your provider.     Consider  reducing daily calorie intake by 500 calories.     Keep a food journal.     Avoiding skipping meals, consider cutting portions instead.    Diet combined with exercise helps maintain muscle while optimizing fat loss. Strength training is particularly important for building and maintaining muscle mass. Exercise helps reduce stress, increase energy, and improves fitness. Increasing exercise without diet control, however, may not burn enough calories to loose weight.       Start walking three days a week 10-20 minutes at a time    Work towards walking thirty minutes five days a week     Eventually, increase the speed of your walking for 1-2 minutes at time    In addition, we recommend that you review healthy lifestyles and methods for weight loss available through the National Institutes of Health patient information sites:  http://win.niddk.nih.gov/publications/index.htm    And look into health and wellness programs that may be available through your health insurance provider, employer, local community center, or sury club.              Follow-ups after your visit        Follow-up notes from your care team     Return in about 4 weeks (around 6/1/2017), or if symptoms worsen or fail to improve.      Who to contact     If you have questions or need follow up information about today's clinic visit or your schedule please contact [facility] directly at [phone].  Normal or non-critical lab and imaging results will be communicated to you by MyChart, letter or phone within 4 business days after the clinic has received the results. If you do not hear from us within 7 days, please contact the clinic through MyChart or phone. If you have a critical or abnormal lab result, we will notify you by phone as soon as possible.  Submit refill requests through [employer] or call your pharmacy and they will forward the refill request to us. Please allow 3 business days for your refill to be completed.           "Additional Information About Your Visit        MyChart Information     [employer] lets you send messages to your doctor, view your test results, renew your prescriptions, schedule appointments and more. To sign up, go to [email or web address] . Click on \"Log in\" on the left side of the screen, which will take you to the Welcome page. Then click on \"Sign up Now\" on the right side of the page.     You will be asked to enter the access code listed below, as well as some personal information. Please follow the directions to create your username and password.     Your access code is: [email or web address]  Expires: 2017 10:29 AM     Your access code will  in 90 days. If you need help or a new code, please call your [place] clinic or [phone].        Care EveryWhere ID     This is your Care EveryWhere ID. This could be used by other organizations to access your [facility] medical records  [id number]        Your Vitals Were     Pulse Height Pulse Oximetry BMI (Body Mass Index)          101 1.626 m (5' 4.02\") 98% 22.65 kg/m2         Blood Pressure from Last 3 Encounters:   17 122/89   17 107/75   17 125/81    Weight from Last 3 Encounters:   17 59.9 kg (132 lb)   17 59 kg (130 lb)   17 59 kg (130 lb)              Today, you had the following     No orders found for display         Today's Medication Changes          These changes are accurate as of: 17 11:59 PM.  If you have any questions, ask your nurse or doctor.               Start taking these medicines.        Dose/Directions    amitriptyline 50 MG tablet   Commonly known as:  ELAVIL   Used for:  Idiopathic insomnia   Started by:  Prakash Blake MD        Take 3 tablets by mouth at bed time.   Quantity:  90 tablet   Refills:  11            Where to get your medicines      These medications were sent to [facility] [place] [address]    "  Phone:  [phone]     amitriptyline 50 MG tablet                Primary Care Provider Office Phone # Fax #    Dara Pope PA-C [phone]       [facility] [address] [facility] [address]        Thank you!     Thank you for choosing [facility]  for your care. Our goal is always to provide you with excellent care. Hearing back from our patients is one way we can continue to improve our services. Please take a few minutes to complete the written survey that you may receive in the mail after your visit with us. Thank you!             Your Updated Medication List - Protect others around you: Learn how to safely use, store and throw away your medicines at www.disposemymeds.org.          This list is accurate as of: 5/4/17 11:59 PM.  Always use your most recent med list.                   Brand Name Dispense Instructions for use    ** PATIENT ALERT **      MAXIMUM Ibuprofen FROM ALL SOURCES SHOULD NOT EXCEED 4000MG IN 24 HOURS       amitriptyline 50 MG tablet    ELAVIL    90 tablet    Take 3 tablets by mouth at bed time.       eszopiclone 3 MG tablet    LUNESTA    30 tablet    Take 1 tablet (3 mg) by mouth nightly as needed for sleep       levonorgestrel 20 MCG/24HR IUD    MIRENA    1 each    1 each (20 mcg) by Intrauterine route once for 1 dose       triamcinolone 55 MCG/ACT Inhaler    NASACORT AQ    1 Bottle    Spray 1-2 sprays into both nostrils daily To help pressure in ears.

## 2017-06-01 RX ORDER — TEMAZEPAM 15 MG/1
CAPSULE ORAL
Qty: 60 CAPSULE | Refills: 5 | Status: SHIPPED | OUTPATIENT
Start: 2017-06-01 | End: 2018-01-31

## 2017-06-01 NOTE — TELEPHONE ENCOUNTER
Patient notified of information below. She needs an rx sent into [place] [facility] for the temazepam.

## 2017-06-01 NOTE — TELEPHONE ENCOUNTER
Patient called and left a voice mail that the medication is not helping her at all. She feels tired all day long. Does not think she is getting any REM sleep. Tired of being tired. Would like to know if you have any other ideas.

## 2017-06-01 NOTE — TELEPHONE ENCOUNTER
I would recommend a trial of Temazepam 15-30mg PO QHS, with starting the Temazepam, would start slow taper of the amitriptyline by 25mg-50mg per week.

## 2017-06-02 ENCOUNTER — TELEPHONE (OUTPATIENT)
Dept: FAMILY MEDICINE | Facility: CLINIC | Age: 47
End: 2017-06-02

## 2017-06-02 NOTE — TELEPHONE ENCOUNTER
Patient has tried all over the counter medications and warm liquids.  Patient was advised to be seen for evaluation.  Patient was scheduled with PCP.    Kallie WELCH RN

## 2017-06-02 NOTE — TELEPHONE ENCOUNTER
Reason for Call:  Other call back    Detailed comments: She has a cough from allergies that has been going on for 3 weeks.  She is wondering if she can get something or does she need to be seen?  Please advise.  She uses [facility]    Phone Number Patient can be reached at: Home number on file [phone] (home)    Best Time: any    Can we leave a detailed message on this number? YES    Call taken on 6/2/2017 at 11:50 AM by Renea Costa

## 2017-06-05 ENCOUNTER — OFFICE VISIT (OUTPATIENT)
Dept: FAMILY MEDICINE | Facility: CLINIC | Age: 47
End: 2017-06-05
Payer: COMMERCIAL

## 2017-06-05 VITALS
HEIGHT: 64 IN | HEART RATE: 115 BPM | BODY MASS INDEX: 22.61 KG/M2 | SYSTOLIC BLOOD PRESSURE: 122 MMHG | DIASTOLIC BLOOD PRESSURE: 77 MMHG | TEMPERATURE: 98 F | WEIGHT: 132.4 LBS

## 2017-06-05 DIAGNOSIS — J30.2 SEASONAL ALLERGIC RHINITIS, UNSPECIFIED ALLERGIC RHINITIS TRIGGER: Primary | ICD-10-CM

## 2017-06-05 DIAGNOSIS — F10.21 ALCOHOLISM IN RECOVERY (H): ICD-10-CM

## 2017-06-05 DIAGNOSIS — R05.9 COUGH: ICD-10-CM

## 2017-06-05 PROCEDURE — 99213 OFFICE O/P EST LOW 20 MIN: CPT | Performed by: PHYSICIAN ASSISTANT

## 2017-06-05 RX ORDER — MONTELUKAST SODIUM 10 MG/1
10 TABLET ORAL DAILY PRN
Qty: 30 TABLET | Refills: 1 | Status: SHIPPED | OUTPATIENT
Start: 2017-06-05 | End: 2017-08-17

## 2017-06-05 ASSESSMENT — ANXIETY QUESTIONNAIRES
5. BEING SO RESTLESS THAT IT IS HARD TO SIT STILL: NOT AT ALL
1. FEELING NERVOUS, ANXIOUS, OR ON EDGE: SEVERAL DAYS
3. WORRYING TOO MUCH ABOUT DIFFERENT THINGS: NOT AT ALL
7. FEELING AFRAID AS IF SOMETHING AWFUL MIGHT HAPPEN: NOT AT ALL
6. BECOMING EASILY ANNOYED OR IRRITABLE: SEVERAL DAYS
GAD7 TOTAL SCORE: 3
2. NOT BEING ABLE TO STOP OR CONTROL WORRYING: NOT AT ALL

## 2017-06-05 ASSESSMENT — PATIENT HEALTH QUESTIONNAIRE - PHQ9: 5. POOR APPETITE OR OVEREATING: SEVERAL DAYS

## 2017-06-05 NOTE — MR AVS SNAPSHOT
After Visit Summary   6/5/2017    Karishma Kimball    MRN: [id number]           Patient Information     Date Of Birth          1970        Visit Information        Provider Department      6/5/2017 2:40 PM Dara Pope PA-C [facility]        Today's Diagnoses     Seasonal allergic rhinitis, unspecified allergic rhinitis trigger    -  1      Care Instructions    I suggest adding sudafed to your allegra or zyrtec  But if leary of this or does not work, can do allegra or zyrtec as twice daily.  This is how it is prescribed by allergists or by [place], but is not FDA approved this way.  If that doesn't work, can try singulair, prescribed today - but somewhat unlikely to help - usually more for allergies with asthma.  If doesn't work, see allergist.  May want to set this up sooner, and can cancel if not needing the appt.    Generics are fine    Call if questions.  Be seen if cough doesn't go away with getting allergies better.          Follow-ups after your visit        Additional Services     ALLERGY/ASTHMA ADULT REFERRAL       Your provider has referred you to: JOHN: [facility] [phone] [email or web address]    Please be aware that coverage of these services is subject to the terms and limitations of your health insurance plan.  Call member services at your health plan with any benefit or coverage questions.      Please bring the following with you to your appointment:    (1) Any X-Rays, CTs or MRIs which have been performed.  Contact the facility where they were done to arrange for  prior to your scheduled appointment.    (2) List of current medications  (3) This referral request   (4) Any documents/labs given to you for this referral                  Who to contact     If you have questions or need follow up information about today's clinic visit or your schedule please contact [facility] directly  "at [phone].  Normal or non-critical lab and imaging results will be communicated to you by MyChart, letter or phone within 4 business days after the clinic has received the results. If you do not hear from us within 7 days, please contact the clinic through [employer] or phone. If you have a critical or abnormal lab result, we will notify you by phone as soon as possible.  Submit refill requests through [employer] or call your pharmacy and they will forward the refill request to us. Please allow 3 business days for your refill to be completed.          Additional Information About Your Visit        [employer] Information     [employer] lets you send messages to your doctor, view your test results, renew your prescriptions, schedule appointments and more. To sign up, go to [email or web address] . Click on \"Log in\" on the left side of the screen, which will take you to the Welcome page. Then click on \"Sign up Now\" on the right side of the page.     You will be asked to enter the access code listed below, as well as some personal information. Please follow the directions to create your username and password.     Your access code is: [email or web address]  Expires: 9/3/2017  3:08 PM     Your access code will  in 90 days. If you need help or a new code, please call your [place] clinic or [phone].        Care EveryWhere ID     This is your Care EveryWhere ID. This could be used by other organizations to access your [place] medical records  [id number]        Your Vitals Were     Pulse Temperature Height BMI (Body Mass Index)          115 98  F (36.7  C) (Tympanic) 5' 4.02\" (1.626 m) 22.72 kg/m2         Blood Pressure from Last 3 Encounters:   17 122/77   17 122/89   17 107/75    Weight from Last 3 Encounters:   17 132 lb 6.4 oz (60.1 kg)   17 132 lb (59.9 kg)   17 130 lb (59 kg)              We Performed the Following     ALLERGY/ASTHMA ADULT REFERRAL          Today's Medication Changes "          These changes are accurate as of: 6/5/17  3:10 PM.  If you have any questions, ask your nurse or doctor.               Start taking these medicines.        Dose/Directions    montelukast 10 MG tablet   Commonly known as:  SINGULAIR   Used for:  Seasonal allergic rhinitis, unspecified allergic rhinitis trigger   Started by:  Dara Pope PA-C        Dose:  10 mg   Take 1 tablet (10 mg) by mouth daily as needed   Quantity:  30 tablet   Refills:  1         Stop taking these medicines if you haven't already. Please contact your care team if you have questions.     eszopiclone 3 MG tablet   Commonly known as:  LUNESTA   Stopped by:  Dara Pope PA-C                Where to get your medicines      These medications were sent to [facility] [place] [address]     Phone:  [phone]     montelukast 10 MG tablet                Primary Care Provider Office Phone # Fax #    Dara Pope PA-C [phone]       [address] [address]        Thank you!     Thank you for choosing [facility]  for your care. Our goal is always to provide you with excellent care. Hearing back from our patients is one way we can continue to improve our services. Please take a few minutes to complete the written survey that you may receive in the mail after your visit with us. Thank you!             Your Updated Medication List - Protect others around you: Learn how to safely use, store and throw away your medicines at www.disposemymeds.org.          This list is accurate as of: 6/5/17  3:10 PM.  Always use your most recent med list.                   Brand Name Dispense Instructions for use    ** PATIENT ALERT **      MAXIMUM Ibuprofen FROM ALL SOURCES SHOULD NOT EXCEED 4000MG IN 24 HOURS       amitriptyline 50 MG tablet    ELAVIL    90 tablet    Take 3 tablets  by mouth at bed time.       levonorgestrel 20 MCG/24HR IUD    MIRENA    1 each    1 each (20 mcg) by Intrauterine route once for 1 dose       montelukast 10 MG tablet    SINGULAIR    30 tablet    Take 1 tablet (10 mg) by mouth daily as needed       temazepam 15 MG capsule    RESTORIL    60 capsule    Take 1-2 capsules by mouth 15-30 minutes before bed.  Start with 1 capsule, ok to increase to 2 capsules if residual insomnia after 3-4 days.       triamcinolone 55 MCG/ACT Inhaler    NASACORT AQ    1 Bottle    Spray 1-2 sprays into both nostrils daily To help pressure in ears.

## 2017-06-05 NOTE — PROGRESS NOTES
"  SUBJECTIVE:                                                    Karishma Kimball is a 46 year old female who presents to clinic today for the following health issues:      ALLERGIES      Duration: 1 month or more    Description:   Nasal congestion: YES  Sneezing: no   Red, itchy eyes: no     Accompanying signs and symptoms: Mostly coughing.  Cough went from rattling-wet cough, to dry.  Last night was worse, coughed until she vomited.    History (similar episodes/allergy testing): None    Precipitating or alleviating factors: None    Therapies tried and outcome: Allegra, robitussin, nasal allergy spray-OTC - triamcinolone     Feels sinus drainage that triggers cough  Feels ears and sinuses get pressure then drain  Constant tickle in throat  Frontal headache when coughs a lot   Has tried the above and overlapping the above x > 1 wk at max dose.    Benadryl keeps her awake.  Had similar cough last summer.  No history asthma.    Successfully continuing her recovery from substance abuse.    Problem list and histories reviewed & adjusted, as indicated.  Additional history: as documented    Reviewed and updated as needed this visit by clinical staff       Reviewed and updated as needed this visit by Provider         ROS:  Constitutional, HEENT, cardiovascular, pulmonary, psych systems are negative, except as otherwise noted.    OBJECTIVE:                                                    /77 (BP Location: Right arm, Patient Position: Chair, Cuff Size: Adult Regular)  Pulse 115  Temp 98  F (36.7  C) (Tympanic)  Ht 5' 4.02\" (1.626 m)  Wt 132 lb 6.4 oz (60.1 kg)  BMI 22.72 kg/m2  Body mass index is 22.72 kg/(m^2).  GENERAL: healthy, alert and no distress  EYES: Eyes grossly normal to inspection, conjunctivae and sclerae normal  HENT:  nose with mottled erythema and pallor and mouth without ulcers or lesions, but cobblestone throat  NECK: no adenopathy, no asymmetry, masses, or scars   RESP: lungs clear to " auscultation - no rales, rhonchi or wheezes       ASSESSMENT/PLAN:                                                        ICD-10-CM    1. Seasonal allergic rhinitis, unspecified allergic rhinitis trigger J30.2 ALLERGY/ASTHMA ADULT REFERRAL     montelukast (SINGULAIR) 10 MG tablet   2. Cough R05    3. Alcoholism in recovery (H) F10.20        Patient Instructions   I suggest adding sudafed to your allegra or zyrtec  But if leary of this or does not work, can do allegra or zyrtec as twice daily.  This is how it is prescribed by allergists or by [place], but is not FDA approved this way.  If that doesn't work, can try singulair, prescribed today - but somewhat unlikely to help - usually more for allergies with asthma.  If doesn't work, see allergist.  May want to set this up sooner, and can cancel if not needing the appt.    Generics are fine    Call if questions.  Be seen if cough doesn't go away with getting allergies better.      Dara Pope PA-C  [facility]

## 2017-06-05 NOTE — NURSING NOTE
"Chief Complaint   Patient presents with     Allergies       Initial /77 (BP Location: Right arm, Patient Position: Chair, Cuff Size: Adult Regular)  Pulse 115  Temp 98  F (36.7  C) (Tympanic)  Ht 5' 4.02\" (1.626 m)  Wt 132 lb 6.4 oz (60.1 kg)  BMI 22.72 kg/m2 Estimated body mass index is 22.72 kg/(m^2) as calculated from the following:    Height as of this encounter: 5' 4.02\" (1.626 m).    Weight as of this encounter: 132 lb 6.4 oz (60.1 kg).  Medication Reconciliation: complete    Health Maintenance that is potentially due pending provider review:  NONE    Abida ANGELA MA        "

## 2017-06-05 NOTE — PATIENT INSTRUCTIONS
I suggest adding sudafed to your allegra or zyrtec  But if leary of this or does not work, can do allegra or zyrtec as twice daily.  This is how it is prescribed by allergists or by [place], but is not FDA approved this way.  If that doesn't work, can try singulair, prescribed today - but somewhat unlikely to help - usually more for allergies with asthma.  If doesn't work, see allergist.  May want to set this up sooner, and can cancel if not needing the appt.    Generics are fine    Call if questions.  Be seen if cough doesn't go away with getting allergies better.

## 2017-06-06 ASSESSMENT — PATIENT HEALTH QUESTIONNAIRE - PHQ9: SUM OF ALL RESPONSES TO PHQ QUESTIONS 1-9: 4

## 2017-06-06 ASSESSMENT — ANXIETY QUESTIONNAIRES: GAD7 TOTAL SCORE: 3

## 2017-06-22 ENCOUNTER — TELEPHONE (OUTPATIENT)
Dept: SLEEP MEDICINE | Facility: CLINIC | Age: 47
End: 2017-06-22

## 2017-06-22 NOTE — TELEPHONE ENCOUNTER
SUBJECTIVE:  Chief Complaint   Patient presents with     Medication Question     temazepam (RESTORIL) 15 MG capsule     Medication Question     amitriptyline (ELAVIL) 50 MG tablet      OBJECTIVE:  Karishma informed me that at her last office visit she was to ween off of amitriptyline and switch to temazepam. Last night was the first night she took Temazepam alone and she had frequent waking and felt restless during the night. She informed me that she doesn't have any caffeine past 11 am and is taking the doctors advise on night time behaviors. She wakes up at 7 am and is to work by 830 am. Usually she is tired during the day until 2 pm however, she would rather feel tired during the day than wake up during the night.      ASSESSMENT/PLAN:  Please review.

## 2017-06-22 NOTE — TELEPHONE ENCOUNTER
I would see if there was a dose of the amitriptyline during the taper that seemed to be at least somewhat helpful.  I feel it is reasonable to continue the Temazepam and consider a careful restart of the Amitriptyline, these would be safe to use together and can aim for the lowest effective dose.

## 2017-06-23 NOTE — TELEPHONE ENCOUNTER
Attempted to contact Karishma at phone number provided during the first contact. Left message on answering machine for Karishma to return call.

## 2017-06-23 NOTE — TELEPHONE ENCOUNTER
Karishma returned call and informed me that she feels taking two 15 mg temazepam and one 50 mg amitriptyline were an effective dose. She does not need refills at this time but will contact us when needed.

## 2017-06-30 ENCOUNTER — OFFICE VISIT (OUTPATIENT)
Dept: FAMILY MEDICINE | Facility: CLINIC | Age: 47
End: 2017-06-30
Payer: COMMERCIAL

## 2017-06-30 ENCOUNTER — RADIANT APPOINTMENT (OUTPATIENT)
Dept: GENERAL RADIOLOGY | Facility: CLINIC | Age: 47
End: 2017-06-30
Attending: PHYSICIAN ASSISTANT
Payer: COMMERCIAL

## 2017-06-30 VITALS
DIASTOLIC BLOOD PRESSURE: 84 MMHG | HEART RATE: 100 BPM | WEIGHT: 135 LBS | SYSTOLIC BLOOD PRESSURE: 116 MMHG | TEMPERATURE: 98.1 F | HEIGHT: 64 IN | BODY MASS INDEX: 23.05 KG/M2

## 2017-06-30 DIAGNOSIS — M54.12 CERVICAL RADICULOPATHY: Primary | ICD-10-CM

## 2017-06-30 DIAGNOSIS — G47.00 INSOMNIA, UNSPECIFIED TYPE: ICD-10-CM

## 2017-06-30 DIAGNOSIS — Z98.890 H/O NECK SURGERY: ICD-10-CM

## 2017-06-30 DIAGNOSIS — F10.21 ALCOHOLISM IN RECOVERY (H): ICD-10-CM

## 2017-06-30 DIAGNOSIS — M54.12 CERVICAL RADICULOPATHY: ICD-10-CM

## 2017-06-30 PROCEDURE — 99214 OFFICE O/P EST MOD 30 MIN: CPT | Performed by: PHYSICIAN ASSISTANT

## 2017-06-30 PROCEDURE — 72040 X-RAY EXAM NECK SPINE 2-3 VW: CPT

## 2017-06-30 NOTE — NURSING NOTE
"Chief Complaint   Patient presents with     Neck Pain       Initial /90 (BP Location: Right arm, Patient Position: Chair, Cuff Size: Adult Regular)  Pulse 100  Temp 98.1  F (36.7  C) (Tympanic)  Ht 5' 4.02\" (1.626 m)  Wt 135 lb (61.2 kg)  BMI 23.16 kg/m2 Estimated body mass index is 23.16 kg/(m^2) as calculated from the following:    Height as of this encounter: 5' 4.02\" (1.626 m).    Weight as of this encounter: 135 lb (61.2 kg).  Medication Reconciliation: complete    Health Maintenance that is potentially due pending provider review:  NONE    Abida ANGELA MA        "

## 2017-06-30 NOTE — MR AVS SNAPSHOT
"              After Visit Summary   6/30/2017    Karishma Kimball    MRN: [id number]           Patient Information     Date Of Birth          1970        Visit Information        Provider Department      6/30/2017 10:00 AM [facility]        Today's Diagnoses     Cervical radiculopathy    -  1      Care Instructions    Will start with Physical Therapy but low threshold for sending for MRI if not improving.  MRI already ordered.    Continue your substance recovery.            Follow-ups after your visit        Additional Services     PHYSICAL THERAPY REFERRAL       *This therapy referral will be filtered to a centralized scheduling office at [facility] and the patient will receive a call to schedule an appointment at a [place] location most convenient for them. *     [facility] provides Physical Therapy evaluation and treatment and many specialty services across the [place] system.  If requesting a specialty program, please choose from the list below.    If you have not heard from the scheduling office within 2 business days, please call [phone] for all locations, with the exception of [place], please call [phone].  Treatment: Evaluation & Treatment  Special Instructions/Modalities: will start with Physical Therapy but low threshold for sending for MRI if not improving.  MRI already ordered.  Special Programs: None    Please be aware that coverage of these services is subject to the terms and limitations of your health insurance plan.  Call member services at your health plan with any benefit or coverage questions.      **Note to Provider:  If you are referring outside of [place] for the therapy appointment, please list the name of the location in the \"special instructions\" above, print the referral and give to the patient to schedule the appointment.                  Future tests that were ordered for you today " "    Open Future Orders        Priority Expected Expires Ordered    XR Cervical Spine 2/3 Views Routine 2017    MR Cervical Spine w/o Contrast Routine  2018            Who to contact     If you have questions or need follow up information about today's clinic visit or your schedule please contact [facility] directly at [phone].  Normal or non-critical lab and imaging results will be communicated to you by [employer], letter or phone within 4 business days after the clinic has received the results. If you do not hear from us within 7 days, please contact the clinic through [employer] or phone. If you have a critical or abnormal lab result, we will notify you by phone as soon as possible.  Submit refill requests through [employer] or call your pharmacy and they will forward the refill request to us. Please allow 3 business days for your refill to be completed.          Additional Information About Your Visit        [employer] Information     [employer] lets you send messages to your doctor, view your test results, renew your prescriptions, schedule appointments and more. To sign up, go to [email or web address] . Click on \"Log in\" on the left side of the screen, which will take you to the Welcome page. Then click on \"Sign up Now\" on the right side of the page.     You will be asked to enter the access code listed below, as well as some personal information. Please follow the directions to create your username and password.     Your access code is: [email or web address]  Expires: 9/3/2017  3:08 PM     Your access code will  in 90 days. If you need help or a new code, please call your [place] clinic or [phone].        Care EveryWhere ID     This is your Care EveryWhere ID. This could be used by other organizations to access your [place] medical records  [id number]        Your Vitals Were     Pulse Temperature Height BMI (Body Mass Index)          100 98.1  F (36.7 " " C) (Tympanic) 5' 4.02\" (1.626 m) 23.16 kg/m2         Blood Pressure from Last 3 Encounters:   06/30/17 125/90   06/05/17 122/77   05/04/17 122/89    Weight from Last 3 Encounters:   06/30/17 135 lb (61.2 kg)   06/05/17 132 lb 6.4 oz (60.1 kg)   05/04/17 132 lb (59.9 kg)              We Performed the Following     PHYSICAL THERAPY REFERRAL          Today's Medication Changes          These changes are accurate as of: 6/30/17 10:26 AM.  If you have any questions, ask your nurse or doctor.               These medicines have changed or have updated prescriptions.        Dose/Directions    amitriptyline 50 MG tablet   Commonly known as:  ELAVIL   This may have changed:    - how much to take  - when to take this  - additional instructions   Used for:  Idiopathic insomnia        Take 3 tablets by mouth at bed time.   Quantity:  90 tablet   Refills:  11                Primary Care Provider Office Phone # Fax #    Dara Pope PA-C [phone]       [place] [id number] [address]        Equal Access to Services     KWADWO COULTER AH: Hadii melodie Velez, wamaria cda hortencia, qaybta kaalmada ademaria esther, bess melara. So [facility] [place] [phone].    ATENCIÓN: Si habla español, tiene a melton disposición servicios gratuitos de asistencia lingüística. [facility] [phone].    We comply with applicable federal civil rights laws and [place] laws. We do not discriminate on the basis of race, color, national origin, age, disability sex, sexual orientation or gender identity.            Thank you!     Thank you for choosing [facility]  for your care. Our goal is always to provide you with excellent care. Hearing back from our patients is one way we can continue to improve our services. Please take a few minutes to complete the written survey that you may receive in the mail after your visit with us. Thank you!             Your Updated " Medication List - Protect others around you: Learn how to safely use, store and throw away your medicines at www.disposemymeds.org.          This list is accurate as of: 6/30/17 10:26 AM.  Always use your most recent med list.                   Brand Name Dispense Instructions for use Diagnosis    ** PATIENT ALERT **      MAXIMUM Ibuprofen FROM ALL SOURCES SHOULD NOT EXCEED 4000MG IN 24 HOURS        amitriptyline 50 MG tablet    ELAVIL    90 tablet    Take 3 tablets by mouth at bed time.    Idiopathic insomnia       levonorgestrel 20 MCG/24HR IUD    MIRENA    1 each    1 each (20 mcg) by Intrauterine route once for 1 dose    Encounter for IUD removal and reinsertion       montelukast 10 MG tablet    SINGULAIR    30 tablet    Take 1 tablet (10 mg) by mouth daily as needed    Seasonal allergic rhinitis, unspecified allergic rhinitis trigger       temazepam 15 MG capsule    RESTORIL    60 capsule    Take 1-2 capsules by mouth 15-30 minutes before bed.  Start with 1 capsule, ok to increase to 2 capsules if residual insomnia after 3-4 days.    Persistent insomnia, Major depressive disorder, recurrent episode, mild (H), Generalized anxiety disorder       triamcinolone 55 MCG/ACT Inhaler    NASACORT AQ    1 Bottle    Spray 1-2 sprays into both nostrils daily To help pressure in ears.    Dysfunction of eustachian tube, unspecified laterality

## 2017-06-30 NOTE — PROGRESS NOTES
"  SUBJECTIVE:                                                    Karishma Kimball is a 46 year old female who presents to clinic today for the following health issues:      Neck Pain      Duration: 6 weeks    Description:  Location:   Radiation:     Intensity:  moderate    Accompanying signs and symptoms: numbness into right arm and hand    History (similar episodes/previous evaluation): Has had prior neck surgery and was told that at some point she will have pain again and possibly another surgery    Precipitating or alleviating factors: None    Therapies tried and outcome: None    Had old injury - fell 4 stories onto her head a few decades ago.  Had a neck surgery, fusion C5-C6, told would likely need other side cleaned as well eventually.  Massage helps but no longer good enough.  No headaches.  Gets neck pain, then back pain, and arm goes numb.  Arm less numb if she uses foam roller.  Feels is truly numb when she is performing a massage, unclear if numbness other times v paresthesia.      Discuss insomnia.  Dr Blake did place her back on temazepam.  She wants me to know that she did not ask for this.  Has history of alcoholism, treatment in Jan.  Discuss risks.    Problem list and histories reviewed & adjusted, as indicated.  Additional history: as documented    BP Readings from Last 3 Encounters:   06/30/17 116/84   06/05/17 122/77   05/04/17 122/89    Wt Readings from Last 3 Encounters:   06/30/17 135 lb (61.2 kg)   06/05/17 132 lb 6.4 oz (60.1 kg)   05/04/17 132 lb (59.9 kg)        Reviewed and updated as needed this visit by clinical staff       Reviewed and updated as needed this visit by Provider         ROS:  Constitutional, musculoskeletal, neuro, psych systems are negative, except as otherwise noted.    OBJECTIVE:     /84 (BP Location: Right arm, Patient Position: Chair, Cuff Size: Adult Regular)  Pulse 100  Temp 98.1  F (36.7  C) (Tympanic)  Ht 5' 4.02\" (1.626 m)  Wt 135 lb (61.2 kg)  " BMI 23.16 kg/m2  Body mass index is 23.16 kg/(m^2).  GENERAL: healthy, alert and no distress  MS: normal shoulder abduction and flexion.  Normal strength to opposed abduction, elbow flexion and extension, wrist flexion and extension, finger abduction and pincer grasp.  Normal and equal brachial and brachioradial reflexes bilaterally.  No atrophy.  No tenderness in neck/shoulder area.      ASSESSMENT/PLAN:       ICD-10-CM    1. Cervical radiculopathy M54.12 XR Cervical Spine 2/3 Views     PHYSICAL THERAPY REFERRAL     MR Cervical Spine w/o Contrast   2. H/O neck surgery Z98.890    3. Insomnia, unspecified type G47.00    4. Alcoholism in recovery (H) F10.20        Patient Instructions   Will start with Physical Therapy but low threshold for sending for MRI if not improving.  MRI already ordered.    Continue your substance recovery.        Dara Pope PA-C  [facility]

## 2017-07-11 ENCOUNTER — HOSPITAL ENCOUNTER (OUTPATIENT)
Dept: PHYSICAL THERAPY | Facility: CLINIC | Age: 47
Setting detail: THERAPIES SERIES
End: 2017-07-11
Attending: PHYSICIAN ASSISTANT
Payer: COMMERCIAL

## 2017-07-11 PROCEDURE — 97162 PT EVAL MOD COMPLEX 30 MIN: CPT | Mod: GP

## 2017-07-11 PROCEDURE — 40000718 ZZHC STATISTIC PT DEPARTMENT ORTHO VISIT

## 2017-07-11 PROCEDURE — 97110 THERAPEUTIC EXERCISES: CPT | Mod: GP

## 2017-07-11 NOTE — PROGRESS NOTES
" 07/11/17 1500   General Information   Type of Visit Initial OP Ortho PT Evaluation   Start of Care Date 07/11/17   Referring Physician Dara Pope PA-C    Patient/Family Goals Statement turning head for driving, ride motorcycle, lift heavy dishes into cupboard, less frequent R arm pain   Orders Evaluate and Treat   Date of Order 06/30/17   Insurance Type [employer]   Insurance Comments/Visits Authorized [facility]: Limited to 120 visits per CALENDAR YEAR, PT/OT/SLP COMBINED, 0 USED   Special Instructions Special Instructions/Modalities: will start with Physical Therapy but low threshold for sending for MRI if not improving.  MRI already ordered.   General Information Comments PMH: Brachial neuritis or radiculitis, Alcoholism in recovery , Hypertension, History of loop electrical excision procedure, [place], depression, dissociative disorder, insomnia   Body Part(s)   Body Part(s) Cervical Spine   Presentation and Etiology   Pertinent history of current problem (include personal factors and/or comorbidities that impact the POC) Per Niko HOGUE note 6/30/17: \"Had old injury - fell 4 stories onto her head a few decades ago.  Had a neck surgery, fusion C5-C6, told would likely need other side cleaned as well eventually.  Massage helps but no longer good enough.  No headaches.  Gets neck pain, then back pain, and arm goes numb.  Arm less numb if she uses foam roller.  Feels is truly numb when she is performing a massage, unclear if numbness other times v paresthesia.\" Pt has started getting cranial sacral therapy, not sure how it works and not helping with ROM in neck. Difficulty with turning head for driving. Constant pain in neck an restricted, variable and unknown cause of R UE numbness.    Impairments A. Pain;E. Decreased flexibility;K. Numbness;N. Headaches;Q. Dizziness   Symptom Location Generally pain in whole neck, upper back mid back down R>L arm, numbness R UE to post tip of third digit "   How/Where did it occur (fall at age 9, cervical fusion)   Onset date of current episode/exacerbation 05/01/17   Chronicity Chronic  (recent flair up)   Pain rating (0-10 point scale) Best (/10);Worst (/10)   Best (/10) 5   Worst (/10) 8   Pain quality C. Aching   Frequency of pain/symptoms D. Other   Pain frequency comment most of the time   Pain/symptoms exacerbated by A. Sitting;C. Lifting;K. Home tasks   Pain/symptoms eased by K. Other   Pain eased by comment massage, stretching, foam roller, chiro   Current / Previous Interventions   Diagnostic Tests: X-ray   X-ray Results Results   X-ray results XR CERVICAL SPINE 2/3 VWS6/30/2017 10:46 AM: 1. Previous anterior fusion at C5-6. 2. Increased degenerative change at C4-5 and C6-7.   Current Level of Function   Patient role/employment history A. Employed;C. Homemaker   Employment Comments massage therapist   Fall Risk Screen   Fall screen completed by PT   Per patient - Fall 2 or more times in past year? Yes   Per patient - Fall with injury in past year? Yes  (sternum out of place, better now)   Timed Up and Go score (seconds) 7.1   Is patient a fall risk? No   Functional Scales   Functional Scales Other   Other Scales  NDI: 30%   Cervical Spine   Cervical Flexion ROM 49   Cervical Extension ROM 53   Cervical Right Side Bending ROM 12   Cervical Left Side Bending ROM 25   Cervical Right Rotation ROM 52, pain B lower neck   Cervical Left Rotation ROM 59, pain B lower neck   Thoracic Right Side Bending ROM limited 25%, mid back pain   Thoracic Left Sidebending ROM  limited 25%, mid back pain   Thoracic Right Rotation 25% limited   Thoracic Left Rotation 50% limited   Shoulder Shrug (C2-C4) Strength 5   Shoulder Abd (C5) Strength 4   Shoulder ER (C5, C6) Strength 3+   Shoulder IR (C5, C6) Strength 5   Shoulder/Wrist/Hand Strength Comments  strength: R=50#, L=45#   Segmental Mobility-Cervical C6 ERSR, C3 ERSL, C2 ERSR, OA RSR   Segmental Mobility-Thoracic T7 ERSR,  T4 FRSR, T2 FRSL   Palpation hypertonic R Pec minor   UE Neural Tension UE Neural Tension Tests   ULTT I (Median and Anterior Interosseous) Negative   ULTT II (Median and Musculocutaneous and Axillary) Negative   ULTT III (Radial) Negative   ULTT IV (Ulnar) Positive  (R)   Planned Therapy Interventions   Planned Therapy Interventions joint mobilization;neuromuscular re-education;ROM;strengthening;stretching;manual therapy   Planned Modality Interventions   Planned Modality Interventions TENS;Cryotherapy;Hot packs   Clinical Impression   Criteria for Skilled Therapeutic Interventions Met yes, treatment indicated   PT Diagnosis Cervical radiculopathy   Influenced by the following impairments strength, ROM, nerve root impingemene, R ulnar nerve tension, facet dysfunction, hypertonicity, chronic pain   Functional limitations due to impairments turning head for driving, ride motorcycle, lift heavy dishes into cupboard, less frequent R arm pain   Clinical Presentation Evolving/Changing   Clinical Presentation Rationale (-) mod disability NDI, chronic pain, variable sx, generalities towards pain (+) good motivation   Clinical Decision Making (Complexity) Moderate complexity   Therapy Frequency 1 time/week   Predicted Duration of Therapy Intervention (days/wks) 6 weeks   Risk & Benefits of therapy have been explained Yes   Patient, Family & other staff in agreement with plan of care Yes   Clinical Impression Comments Pt is an enthusiastic woman with prior success with skilled PT from C5-6 fusion and chronic pain sense age 9. Pt presents with cervical/thoracic facet dysfunction per mobility testing, C7 nerve root impingement per radicular sx, ulnar nerve tension per ULTT, hypertonicity per palpation, chronic pain per subjective. Pt is appropriate for skilled PT to address impairments and improve function in upper body.    Education Assessment   Preferred Learning Style Listening;Reading;Demonstration;Pictures/video    Barriers to Learning No barriers   ORTHO GOALS   PT Ortho Eval Goals 1;2;3;4   Ortho Goal 1   Goal Identifier turning head for driving,   Goal Description Following PT interventions, pt will increase B cervical rot AROM to 65* to have less difficultyturning head for driving   Target Date 07/25/17   Ortho Goal 2   Goal Identifier less frequent R arm pain   Goal Description Following PT interventions, pt will test neg for R ulnar ULTT for less frequent R arm pain   Target Date 08/01/17   Ortho Goal 3   Goal Identifier lift heavy dishes into cupboard   Goal Description Following PT interventions, pt will increase B shoulder ER strength to 4/5 MMT grade to be able to lift heavy dishes into cupboard   Target Date 08/15/17   Ortho Goal 4   Goal Identifier ride motorcycle   Goal Description Following PT interventions, pt will score 15% on NDI to attempt to ride motorcycle   Target Date 08/22/17   Total Evaluation Time   Total Evaluation Time 30min   Aubrey Adams, PT, DPT   Doctor of Physical Therapy # [id number]  [facility]  [phone]

## 2017-07-20 ENCOUNTER — OFFICE VISIT (OUTPATIENT)
Dept: FAMILY MEDICINE | Facility: CLINIC | Age: 47
End: 2017-07-20
Payer: COMMERCIAL

## 2017-07-20 VITALS
WEIGHT: 138 LBS | HEIGHT: 64 IN | HEART RATE: 88 BPM | SYSTOLIC BLOOD PRESSURE: 110 MMHG | TEMPERATURE: 98.5 F | DIASTOLIC BLOOD PRESSURE: 62 MMHG | BODY MASS INDEX: 23.56 KG/M2

## 2017-07-20 DIAGNOSIS — H60.501 ACUTE OTITIS EXTERNA OF RIGHT EAR, UNSPECIFIED TYPE: Primary | ICD-10-CM

## 2017-07-20 PROCEDURE — 99213 OFFICE O/P EST LOW 20 MIN: CPT | Performed by: NURSE PRACTITIONER

## 2017-07-20 RX ORDER — OFLOXACIN 3 MG/ML
10 SOLUTION AURICULAR (OTIC) 2 TIMES DAILY
Qty: 10 ML | Refills: 0 | Status: SHIPPED | OUTPATIENT
Start: 2017-07-20 | End: 2017-07-27

## 2017-07-20 NOTE — PROGRESS NOTES
SUBJECTIVE:                                                    Karishma Kimball is a 46 year old female who presents to clinic today for the following health issues:      ENT Symptoms             Symptoms: cc Present Absent Comment   Fever/Chills   x    Fatigue   x    Muscle Aches   x    Eye Irritation   x    Sneezing   x    Nasal Suresh/Drg   x    Sinus Pressure/Pain   x    Loss of smell   x    Dental pain   x    Sore Throat   x    Swollen Glands   x    Ear Pain/Fullness x x  Right > left. Pt feels as if ears stinks    Cough   x    Wheeze   x    Chest Pain   x    Shortness of breath   x    Rash   x    Other   x      Symptom duration:  2 days    Symptom severity:  mild    Treatments tried:  ear vacuum, peroxide    Contacts:  none      Discomfort in outer ear.  Drainage and odor present.    Problem list and histories reviewed & adjusted, as indicated.  Additional history: as documented    Patient Active Problem List   Diagnosis     Generalized anxiety disorder     IUD (intrauterine device) in place     Mild major depression (H)     Brachial neuritis or radiculitis     Dissociative disorder     History of loop electrical excision procedure (LEEP)     Hypertension     Persistent insomnia     Substance abuse     Alcoholism in recovery (H)     Past Surgical History:   Procedure Laterality Date     FUSION CERVICAL ANTERIOR ONE LEVEL  9/16/2013    Procedure: FUSION CERVICAL ANTERIOR ONE LEVEL;  ANTERIOR CERVICAL DECOMPRESSION AND FUSION C5-6 WITH INSTRUMENTATION, ALLOGRAFT AND BONE MARROW ASPIRATION OF THE LEFT ILIAC CREST ;  Surgeon: Randy Jameson MD;  Location: [facility] OR     GRAFT BONE FROM ILIAC CREST  9/16/2013    Procedure: GRAFT BONE FROM ILIAC CREST;;  Surgeon: Randy Jameson MD;  Location: [facility] OR     HC COLP CERVIX/UPPER VAGINA W LOOP ELEC BX CERVIX       SURGICAL HISTORY OF -       Colposcopy with loop electrode excision of the cervix       Social History   Substance Use Topics     Smoking status:  Former Smoker     Packs/day: 0.50     Types: Cigarettes     Smokeless tobacco: Never Used     Alcohol use No     Family History   Problem Relation Age of Onset     DIABETES Maternal Grandmother      Hypertension Maternal Grandmother      Substance Abuse Maternal Grandmother      DIABETES Paternal Grandmother      Substance Abuse Paternal Grandmother      Alcohol/Drug Father      alcohol     Anxiety Disorder Father      Substance Abuse Father      Alcohol/Drug Sister      drugs     Substance Abuse Sister      Depression Sister      Anxiety Disorder Sister      Anxiety Disorder Mother      Substance Abuse Mother      Depression Mother      Substance Abuse Maternal Grandfather      Substance Abuse Paternal Grandfather      Anxiety Disorder Daughter          Current Outpatient Prescriptions   Medication Sig Dispense Refill     ofloxacin (FLOXIN) 0.3 % otic solution Place 10 drops into the right ear 2 times daily for 7 days 10 mL 0     temazepam (RESTORIL) 15 MG capsule Take 1-2 capsules by mouth 15-30 minutes before bed.  Start with 1 capsule, ok to increase to 2 capsules if residual insomnia after 3-4 days. 60 capsule 5     amitriptyline (ELAVIL) 50 MG tablet Take 3 tablets by mouth at bed time. (Patient taking differently: 50 mg At Bedtime Take 3 tablets by mouth at bed time.) 90 tablet 11     levonorgestrel (MIRENA) 20 MCG/24HR IUD 1 each (20 mcg) by Intrauterine route once for 1 dose 1 each 0     ** PATIENT ALERT ** MAXIMUM Ibuprofen FROM ALL SOURCES SHOULD NOT EXCEED 4000MG IN 24 HOURS       montelukast (SINGULAIR) 10 MG tablet Take 1 tablet (10 mg) by mouth daily as needed (Patient not taking: Reported on 6/30/2017) 30 tablet 1     triamcinolone (NASACORT AQ) 55 MCG/ACT Inhaler Spray 1-2 sprays into both nostrils daily To help pressure in ears. (Patient not taking: Reported on 7/20/2017) 1 Bottle 11     No Known Allergies  Labs reviewed in EPIC      Reviewed and updated as needed this visit by clinical staff    "  Reviewed and updated as needed this visit by Provider       ROS:  Constitutional, HEENT, cardiovascular, pulmonary, gi and gu systems are negative, except as otherwise noted.      OBJECTIVE:   /62 (Cuff Size: Adult Regular)  Pulse 88  Temp 98.5  F (36.9  C) (Tympanic)  Ht 5' 4\" (1.626 m)  Wt 138 lb (62.6 kg)  BMI 23.69 kg/m2  Body mass index is 23.69 kg/(m^2).  GENERAL: healthy, alert and no distress  HENT: normal cephalic/atraumatic, right ear: minimal purulent drainage in canal, left ear: normal: no effusions, no erythema, normal landmarks, nose and mouth without ulcers or lesions, oropharynx clear and oral mucous membranes moist  NECK: no adenopathy  RESP: lungs clear to auscultation - no rales, rhonchi or wheezes  CV: regular rate and rhythm, normal S1 S2, no S3 or S4, no murmur, click or rub, no peripheral edema and peripheral pulses strong  PSYCH: mentation appears normal, affect normal/bright    Diagnostic Test Results:  none     ASSESSMENT/PLAN:     1. Acute otitis externa of right ear, unspecified type  Ofoxacin sent to the pharmacy for symptoms.  Symptomatic care and follow up discussed.  - ofloxacin (FLOXIN) 0.3 % otic solution; Place 10 drops into the right ear 2 times daily for 7 days  Dispense: 10 mL; Refill: 0    Home care instructions were reviewed with the patient. The risks, benefits and treatment options of prescribed medications or other treatments have been discussed with the patient. The patient verbalized their understanding and should call or follow up if no improvement or if they develop further problems.    Patient Instructions   Ear drops sent to the pharmacy    Follow up if symptoms do not improve or worsen.        MONIQUE Braswell [facility]  "

## 2017-07-20 NOTE — NURSING NOTE
"Chief Complaint   Patient presents with     Ear Problem       Initial /62 (Cuff Size: Adult Regular)  Pulse 88  Temp 98.5  F (36.9  C) (Tympanic)  Ht 5' 4\" (1.626 m)  Wt 138 lb (62.6 kg)  BMI 23.69 kg/m2 Estimated body mass index is 23.69 kg/(m^2) as calculated from the following:    Height as of this encounter: 5' 4\" (1.626 m).    Weight as of this encounter: 138 lb (62.6 kg).  Medication Reconciliation: complete    Health Maintenance that is potentially due pending provider review:  NONE    n/a    Is there anyone who you would like to be able to receive your results? No  If yes have patient fill out MACEY      "

## 2017-07-20 NOTE — MR AVS SNAPSHOT
"              After Visit Summary   2017    Karishma Kimball    MRN: [id number]           Patient Information     Date Of Birth          1970        Visit Information        Provider Department      2017 2:20 PM Ashely Sandoval APRN CNP [facility]        Today's Diagnoses     Acute otitis externa of right ear, unspecified type    -  1      Care Instructions    Ear drops sent to the pharmacy    Follow up if symptoms do not improve or worsen.            Follow-ups after your visit        Who to contact     If you have questions or need follow up information about today's clinic visit or your schedule please contact [facility] directly at [phone].  Normal or non-critical lab and imaging results will be communicated to you by MyChart, letter or phone within 4 business days after the clinic has received the results. If you do not hear from us within 7 days, please contact the clinic through [employer] or phone. If you have a critical or abnormal lab result, we will notify you by phone as soon as possible.  Submit refill requests through [employer] or call your pharmacy and they will forward the refill request to us. Please allow 3 business days for your refill to be completed.          Additional Information About Your Visit        MyChart Information     [employer] lets you send messages to your doctor, view your test results, renew your prescriptions, schedule appointments and more. To sign up, go to [email or web address] . Click on \"Log in\" on the left side of the screen, which will take you to the Welcome page. Then click on \"Sign up Now\" on the right side of the page.     You will be asked to enter the access code listed below, as well as some personal information. Please follow the directions to create your username and password.     Your access code is: [email or web address]  Expires: 9/3/2017  3:08 PM     Your access code will  in 90 days. If you need help " "or a new code, please call your [facility] or [phone].        Care EveryWhere ID     This is your Care EveryWhere ID. This could be used by other organizations to access your [place] medical records  [id number]        Your Vitals Were     Pulse Temperature Height BMI (Body Mass Index)          88 98.5  F (36.9  C) (Tympanic) 5' 4\" (1.626 m) 23.69 kg/m2         Blood Pressure from Last 3 Encounters:   07/20/17 110/62   06/30/17 116/84   06/05/17 122/77    Weight from Last 3 Encounters:   07/20/17 138 lb (62.6 kg)   06/30/17 135 lb (61.2 kg)   06/05/17 132 lb 6.4 oz (60.1 kg)              Today, you had the following     No orders found for display         Today's Medication Changes          These changes are accurate as of: 7/20/17  2:28 PM.  If you have any questions, ask your nurse or doctor.               Start taking these medicines.        Dose/Directions    ofloxacin 0.3 % otic solution   Commonly known as:  FLOXIN   Used for:  Acute otitis externa of right ear, unspecified type   Started by:  Ashely Sandoval APRN CNP        Dose:  10 drop   Place 10 drops into the right ear 2 times daily for 7 days   Quantity:  10 mL   Refills:  0         These medicines have changed or have updated prescriptions.        Dose/Directions    amitriptyline 50 MG tablet   Commonly known as:  ELAVIL   This may have changed:    - how much to take  - when to take this  - additional instructions   Used for:  Idiopathic insomnia        Take 3 tablets by mouth at bed time.   Quantity:  90 tablet   Refills:  11            Where to get your medicines      These medications were sent to [facility] [address] [address]     Phone:  [phone]     ofloxacin 0.3 % otic solution                Primary Care Provider Office Phone # Fax #    Dara Pope PA-C [phone]       [address]" [facility] [address]        Equal Access to Services     [facility] MARYLIN : Hadii aad ku hadjuniefranck Sheldonali, waaxda luqadaha, qaybta kaalmada chantal, bess melara. So [facility] [phone].    ATENCIÓN: Si habla español, tiene a melton disposición servicios gratuitos de asistencia lingüística. Triny al [phone].    We comply with applicable federal civil rights laws and [place] laws. We do not discriminate on the basis of race, color, national origin, age, disability sex, sexual orientation or gender identity.            Thank you!     Thank you for choosing [facility]  for your care. Our goal is always to provide you with excellent care. Hearing back from our patients is one way we can continue to improve our services. Please take a few minutes to complete the written survey that you may receive in the mail after your visit with us. Thank you!             Your Updated Medication List - Protect others around you: Learn how to safely use, store and throw away your medicines at www.disposemymeds.org.          This list is accurate as of: 7/20/17  2:28 PM.  Always use your most recent med list.                   Brand Name Dispense Instructions for use Diagnosis    ** PATIENT ALERT **      MAXIMUM Ibuprofen FROM ALL SOURCES SHOULD NOT EXCEED 4000MG IN 24 HOURS        amitriptyline 50 MG tablet    ELAVIL    90 tablet    Take 3 tablets by mouth at bed time.    Idiopathic insomnia       levonorgestrel 20 MCG/24HR IUD    MIRENA    1 each    1 each (20 mcg) by Intrauterine route once for 1 dose    Encounter for IUD removal and reinsertion       montelukast 10 MG tablet    SINGULAIR    30 tablet    Take 1 tablet (10 mg) by mouth daily as needed    Seasonal allergic rhinitis, unspecified allergic rhinitis trigger       ofloxacin 0.3 % otic solution    FLOXIN    10 mL    Place 10 drops into the right ear 2 times daily for 7 days    Acute otitis externa of right ear, unspecified type        temazepam 15 MG capsule    RESTORIL    60 capsule    Take 1-2 capsules by mouth 15-30 minutes before bed.  Start with 1 capsule, ok to increase to 2 capsules if residual insomnia after 3-4 days.    Persistent insomnia, Major depressive disorder, recurrent episode, mild (H), Generalized anxiety disorder       triamcinolone 55 MCG/ACT Inhaler    NASACORT AQ    1 Bottle    Spray 1-2 sprays into both nostrils daily To help pressure in ears.    Dysfunction of eustachian tube, unspecified laterality

## 2017-07-24 ENCOUNTER — HOSPITAL ENCOUNTER (OUTPATIENT)
Dept: PHYSICAL THERAPY | Facility: CLINIC | Age: 47
Setting detail: THERAPIES SERIES
End: 2017-07-24
Attending: PHYSICIAN ASSISTANT
Payer: COMMERCIAL

## 2017-07-24 PROCEDURE — 97110 THERAPEUTIC EXERCISES: CPT | Mod: GP | Performed by: PHYSICAL THERAPIST

## 2017-07-24 PROCEDURE — 40000718 ZZHC STATISTIC PT DEPARTMENT ORTHO VISIT: Performed by: PHYSICAL THERAPIST

## 2017-07-24 PROCEDURE — 97140 MANUAL THERAPY 1/> REGIONS: CPT | Mod: GP | Performed by: PHYSICAL THERAPIST

## 2017-08-01 ENCOUNTER — HOSPITAL ENCOUNTER (OUTPATIENT)
Dept: PHYSICAL THERAPY | Facility: CLINIC | Age: 47
Setting detail: THERAPIES SERIES
End: 2017-08-01
Attending: PHYSICIAN ASSISTANT
Payer: COMMERCIAL

## 2017-08-01 PROCEDURE — 40000718 ZZHC STATISTIC PT DEPARTMENT ORTHO VISIT

## 2017-08-01 PROCEDURE — 97110 THERAPEUTIC EXERCISES: CPT | Mod: GP

## 2017-08-07 ENCOUNTER — HOSPITAL ENCOUNTER (OUTPATIENT)
Dept: PHYSICAL THERAPY | Facility: CLINIC | Age: 47
Setting detail: THERAPIES SERIES
End: 2017-08-07
Attending: PHYSICIAN ASSISTANT
Payer: COMMERCIAL

## 2017-08-07 PROCEDURE — 97140 MANUAL THERAPY 1/> REGIONS: CPT | Mod: GP

## 2017-08-07 PROCEDURE — 40000718 ZZHC STATISTIC PT DEPARTMENT ORTHO VISIT

## 2017-08-11 ENCOUNTER — TELEPHONE (OUTPATIENT)
Dept: SLEEP MEDICINE | Facility: CLINIC | Age: 47
End: 2017-08-11

## 2017-08-11 DIAGNOSIS — G47.00 INSOMNIA, UNSPECIFIED TYPE: Primary | ICD-10-CM

## 2017-08-11 NOTE — TELEPHONE ENCOUNTER
"SUBJECTIVE:  Chief Complaint   Patient presents with     Sleep Problem     Request for Actigraphy      OBJECTIVE:  Karishma called requesting to \"do the test where you wear the watch\"  It looks like it was discuss at last office visit 05/04/2017.    ASSESSMENT/PLAN:  Please review and place orders.  "

## 2017-08-14 ENCOUNTER — HOSPITAL ENCOUNTER (OUTPATIENT)
Dept: PHYSICAL THERAPY | Facility: CLINIC | Age: 47
Setting detail: THERAPIES SERIES
End: 2017-08-14
Attending: PHYSICIAN ASSISTANT
Payer: COMMERCIAL

## 2017-08-14 ENCOUNTER — TELEPHONE (OUTPATIENT)
Dept: FAMILY MEDICINE | Facility: CLINIC | Age: 47
End: 2017-08-14

## 2017-08-14 PROCEDURE — 97140 MANUAL THERAPY 1/> REGIONS: CPT | Mod: GP

## 2017-08-14 PROCEDURE — 40000718 ZZHC STATISTIC PT DEPARTMENT ORTHO VISIT

## 2017-08-14 NOTE — TELEPHONE ENCOUNTER
I talked with Karishma and I told her that I would ask Dara Pope PA-C on 8-16-17 if she prescribes antabuse or if needs to be seen.  She is wanting this in case feels like is going to drink alcohol again.  Gwendolyn Lopez RN

## 2017-08-14 NOTE — TELEPHONE ENCOUNTER
Reason for Call:  Other     Detailed comments: Patient is requesting a RX for Antiabuse - She is affraid she might drink and she doesn't want to - please call pt    Phone Number Patient can be reached at: Home number on file [phone] (home)    Best Time:     Can we leave a detailed message on this number? YES    Call taken on 8/14/2017 at 9:17 AM by Litzy Lockhart

## 2017-08-16 NOTE — TELEPHONE ENCOUNTER
Karishma called requesting an update on this request.   I informed her that an order was placed. I will call her tomorrow to organize / arrange a .

## 2017-08-17 ENCOUNTER — OFFICE VISIT (OUTPATIENT)
Dept: SLEEP MEDICINE | Facility: CLINIC | Age: 47
End: 2017-08-17
Payer: COMMERCIAL

## 2017-08-17 ENCOUNTER — OFFICE VISIT (OUTPATIENT)
Dept: FAMILY MEDICINE | Facility: CLINIC | Age: 47
End: 2017-08-17
Payer: COMMERCIAL

## 2017-08-17 VITALS
DIASTOLIC BLOOD PRESSURE: 88 MMHG | BODY MASS INDEX: 23.22 KG/M2 | RESPIRATION RATE: 12 BRPM | HEART RATE: 89 BPM | TEMPERATURE: 97.7 F | WEIGHT: 136 LBS | SYSTOLIC BLOOD PRESSURE: 130 MMHG | HEIGHT: 64 IN

## 2017-08-17 DIAGNOSIS — F51.04 CHRONIC INSOMNIA: ICD-10-CM

## 2017-08-17 DIAGNOSIS — G47.00 INSOMNIA, UNSPECIFIED TYPE: ICD-10-CM

## 2017-08-17 DIAGNOSIS — F10.21 ALCOHOLISM IN RECOVERY (H): Primary | ICD-10-CM

## 2017-08-17 PROCEDURE — 99213 OFFICE O/P EST LOW 20 MIN: CPT | Performed by: PHYSICIAN ASSISTANT

## 2017-08-17 RX ORDER — DISULFIRAM 250 MG/1
250 TABLET ORAL DAILY
Qty: 30 TABLET | Refills: 3 | Status: SHIPPED | OUTPATIENT
Start: 2017-08-17 | End: 2017-12-07

## 2017-08-17 NOTE — PROGRESS NOTES
SUBJECTIVE:  Chief Complaint   Patient presents with     Sleep Study      actigraphy         OBJECTIVE:  actigraphy  ordered.    Instructions were reviewed with Karishma and were also printed for Karishma to take with her.   Karishma verbalized understanding and demonstrated use very well.     ASSESSMENT/PLAN:  Karishma received actigraphy  today August 17, 2017. Pt will use device and will return on 9/1/17

## 2017-08-17 NOTE — MR AVS SNAPSHOT
After Visit Summary   8/17/2017    Karishma Kimball    MRN: [id number]           Patient Information     Date Of Birth          1970        Visit Information        Provider Department      8/17/2017 2:20 PM Dara Pope PA-C [facility]        Today's Diagnoses     Alcoholism in recovery (H)    -  1      Care Instructions    Start antabuse   If having any troubles with alcohol, despite this, see me  Keep in touch with sponsor          Follow-ups after your visit        Your next 10 appointments already scheduled     Aug 17, 2017  3:30 PM CDT   Actigraphy Pickup with SLEEP LAB, BED FIVE   [facility] ([facility])    [address]  [facility] [address] [phone]            Aug 21, 2017  4:30 PM CDT   Ortho Treatment with Leana Ricks PT   [facility] ([facility])    [address] [place] [address] [phone]              Who to contact     If you have questions or need follow up information about today's clinic visit or your schedule please contact [facility] directly at [phone].  Normal or non-critical lab and imaging results will be communicated to you by MyChart, letter or phone within 4 business days after the clinic has received the results. If you do not hear from us within 7 days, please contact the clinic through MyChart or phone. If you have a critical or abnormal lab result, we will notify you by phone as soon as possible.  Submit refill requests through [employer] or call your pharmacy and they will forward the refill request to us. Please allow 3 business days for your refill to be completed.          Additional Information About Your Visit        [employer] Information     [employer] lets you send messages to your doctor, view your test results, renew your prescriptions, schedule appointments and more. To sign up, go to  "[email or web address] . Click on \"Log in\" on the left side of the screen, which will take you to the Welcome page. Then click on \"Sign up Now\" on the right side of the page.     You will be asked to enter the access code listed below, as well as some personal information. Please follow the directions to create your username and password.     Your access code is: [email or web address]  Expires: 9/3/2017  3:08 PM     Your access code will  in 90 days. If you need help or a new code, please call your [place] clinic or [phone].        Care EveryWhere ID     This is your Care EveryWhere ID. This could be used by other organizations to access your [place] medical records  [id number]        Your Vitals Were     Pulse Temperature Respirations Height BMI (Body Mass Index)       89 97.7  F (36.5  C) (Tympanic) 12 5' 4\" (1.626 m) 23.34 kg/m2        Blood Pressure from Last 3 Encounters:   17 130/88   17 110/62   17 116/84    Weight from Last 3 Encounters:   17 136 lb (61.7 kg)   17 138 lb (62.6 kg)   17 135 lb (61.2 kg)              Today, you had the following     No orders found for display         Today's Medication Changes          These changes are accurate as of: 17  2:51 PM.  If you have any questions, ask your nurse or doctor.               Start taking these medicines.        Dose/Directions    disulfiram 250 MG tablet   Commonly known as:  ANTABUSE   Used for:  Alcoholism in recovery (H)   Started by:  Dara Pope PA-C        Dose:  250 mg   Take 1 tablet (250 mg) by mouth daily   Quantity:  30 tablet   Refills:  3         These medicines have changed or have updated prescriptions.        Dose/Directions    amitriptyline 50 MG tablet   Commonly known as:  ELAVIL   This may have changed:    - how much to take  - when to take this  - additional instructions   Used for:  Idiopathic insomnia        Take 3 tablets by mouth at bed time.   Quantity:  90 tablet "   Refills:  11         Stop taking these medicines if you haven't already. Please contact your care team if you have questions.     montelukast 10 MG tablet   Commonly known as:  SINGULAIR   Stopped by:  Dara Pope PA-C           triamcinolone 55 MCG/ACT Inhaler   Commonly known as:  NASACORT AQ   Stopped by:  Dara Pope PA-C                Where to get your medicines      These medications were sent to [facility] [place] [id number]     Phone:  [phone]     disulfiram 250 MG tablet                Primary Care Provider Office Phone # Fax #    Dara Pope PA-C [phone]       [address] [place] [address]        Equal Access to Services     KWADWO COULTER : Hadii aad ku hadasho Soomaali, waaxda luqadaha, qaybta kaalmada adeolgayaamber, bess melara. So [facility] [phone].    ATENCIÓN: Si habla español, tiene a melton disposición servicios gratuitos de asistencia lingüística. [facility] [phone].    We comply with applicable federal civil rights laws and [place] laws. We do not discriminate on the basis of race, color, national origin, age, disability sex, sexual orientation or gender identity.            Thank you!     Thank you for choosing [facility]  for your care. Our goal is always to provide you with excellent care. Hearing back from our patients is one way we can continue to improve our services. Please take a few minutes to complete the written survey that you may receive in the mail after your visit with us. Thank you!             Your Updated Medication List - Protect others around you: Learn how to safely use, store and throw away your medicines at www.disposemymeds.org.          This list is accurate as of: 8/17/17  2:51 PM.  Always use your most recent med list.                   Brand Name Dispense Instructions for use  Diagnosis    ** PATIENT ALERT **      MAXIMUM Ibuprofen FROM ALL SOURCES SHOULD NOT EXCEED 4000MG IN 24 HOURS        amitriptyline 50 MG tablet    ELAVIL    90 tablet    Take 3 tablets by mouth at bed time.    Idiopathic insomnia       disulfiram 250 MG tablet    ANTABUSE    30 tablet    Take 1 tablet (250 mg) by mouth daily    Alcoholism in recovery (H)       levonorgestrel 20 MCG/24HR IUD    MIRENA    1 each    1 each (20 mcg) by Intrauterine route once for 1 dose    Encounter for IUD removal and reinsertion       temazepam 15 MG capsule    RESTORIL    60 capsule    Take 1-2 capsules by mouth 15-30 minutes before bed.  Start with 1 capsule, ok to increase to 2 capsules if residual insomnia after 3-4 days.    Persistent insomnia, Major depressive disorder, recurrent episode, mild (H), Generalized anxiety disorder

## 2017-08-17 NOTE — NURSING NOTE
"Chief Complaint   Patient presents with     Medication Request       Initial /88 (BP Location: Right arm, Patient Position: Chair, Cuff Size: Adult Regular)  Pulse 89  Temp 97.7  F (36.5  C) (Tympanic)  Resp 12  Ht 5' 4\" (1.626 m)  Wt 136 lb (61.7 kg)  BMI 23.34 kg/m2 Estimated body mass index is 23.34 kg/(m^2) as calculated from the following:    Height as of this encounter: 5' 4\" (1.626 m).    Weight as of this encounter: 136 lb (61.7 kg).  Medication Reconciliation: complete    Health Maintenance that is potentially due pending provider review:  NONE        Is there anyone who you would like to be able to receive your results? No  If yes have patient fill out MACEY      "

## 2017-08-17 NOTE — PATIENT INSTRUCTIONS
Start antabuse   If having any troubles with alcohol, despite this, see me  Keep in touch with sponsor

## 2017-08-17 NOTE — PROGRESS NOTES
"  SUBJECTIVE:   Karishma Kimball is a 47 year old female who presents to clinic today for the following health issues:      * Medication request-  Would like to start antabuse    Patient in recovery since earlier this year.  Now found her  was keeping alcohol in house.  Does not think he will stop hiding alcohol that she may inadvertently find.  Called her sponsor when she found this.  Plan is antabuse, counseling, calling sponsor and AA.  Has not drank since her treatment early this year.  Does have some work stress, and stress with daughter's medical condition.  Does not feel there is any depression or anxiety contribution.  Is having worsening of her chronic insomnia, which in the past has lead her to drink.  Is working with sleep specialist on this.    Problem list and histories reviewed & adjusted, as indicated.  Additional history: as documented    BP Readings from Last 3 Encounters:   08/17/17 130/88   07/20/17 110/62   06/30/17 116/84    Wt Readings from Last 3 Encounters:   08/17/17 136 lb (61.7 kg)   07/20/17 138 lb (62.6 kg)   06/30/17 135 lb (61.2 kg)         Reviewed and updated as needed this visit by clinical staffTobacco  Allergies  Meds  Problems  Med Hx  Surg Hx  Fam Hx  Soc Hx        Reviewed and updated as needed this visit by Provider  Allergies  Meds  Problems  Med Hx  Surg Hx  Fam Hx         ROS:  Constitutional, HEENT, cardiovascular, pulmonary, GI, , musculoskeletal, neuro, skin, endocrine and psych systems are negative, except as otherwise noted.      OBJECTIVE:   /88 (BP Location: Right arm, Patient Position: Chair, Cuff Size: Adult Regular)  Pulse 89  Temp 97.7  F (36.5  C) (Tympanic)  Resp 12  Ht 5' 4\" (1.626 m)  Wt 136 lb (61.7 kg)  BMI 23.34 kg/m2  Body mass index is 23.34 kg/(m^2).  GENERAL: healthy, alert and no distress  PSYCH: mentation appears normal, affect normal, slightly worried and intermittently tearful in discussing " daughter    ASSESSMENT/PLAN:       ICD-10-CM    1. Alcoholism in recovery (H) F10.20 disulfiram (ANTABUSE) 250 MG tablet   2. Chronic insomnia F51.04 Working with sleep specialist       Patient Instructions   Start antabuse   If having any troubles with alcohol, despite this, see me  Keep in touch with sponsor      Dara Pope PA-C  [facility]

## 2017-08-17 NOTE — MR AVS SNAPSHOT
After Visit Summary   8/17/2017    Karishma Kimball    MRN: [id number]           Patient Information     Date Of Birth          1970        Visit Information        Provider Department      8/17/2017 3:30 PM SLEEP LAB, BED FIVE [facility]        Today's Diagnoses     Insomnia, unspecified type           Follow-ups after your visit        Your next 10 appointments already scheduled     Aug 21, 2017  4:30 PM CDT   Ortho Treatment with Leana Ricks PT   [facility] ([facility])    [address] [place] [id number]   [phone]            Sep 01, 2017  3:30 PM CDT   Actigraphy Drop Off with SLEEP LAB, BED FIVE   [facility]    [address]  [facility] [id number] [phone]            Sep 15, 2017  4:00 PM CDT   Return Sleep Patient with Prakash Blake MD   [facility] ([facility]    [address]  [facility] [id number] [phone]              Who to contact     If you have questions or need follow up information about today's clinic visit or your schedule please contact [facility] directly at [phone].  Normal or non-critical lab and imaging results will be communicated to you by MyChart, letter or phone within 4 business days after the clinic has received the results. If you do not hear from us within 7 days, please contact the clinic through MyChart or phone. If you have a critical or abnormal lab result, we will notify you by phone as soon as possible.  Submit refill requests through [employer] or call your pharmacy and they will forward the refill request to us. Please allow 3 business days for your refill to be completed.          Additional Information About Your Visit        [employer] Records Management Information     [employer] lets you send messages to your doctor, view your test results, renew your  "prescriptions, schedule appointments and more. To sign up, go to [email or web address] . Click on \"Log in\" on the left side of the screen, which will take you to the Welcome page. Then click on \"Sign up Now\" on the right side of the page.     You will be asked to enter the access code listed below, as well as some personal information. Please follow the directions to create your username and password.     Your access code is: [email or web address]  Expires: 9/3/2017  3:08 PM     Your access code will  in 90 days. If you need help or a new code, please call your [place] clinic or [phone].        Care EveryWhere ID     This is your Care EveryWhere ID. This could be used by other organizations to access your [place] medical records  [id number]         Blood Pressure from Last 3 Encounters:   17 130/88   17 110/62   17 116/84    Weight from Last 3 Encounters:   17 61.7 kg (136 lb)   17 62.6 kg (138 lb)   17 61.2 kg (135 lb)              We Performed the Following     Comprehensive Sleep Study          Today's Medication Changes          These changes are accurate as of: 17  4:23 PM.  If you have any questions, ask your nurse or doctor.               Start taking these medicines.        Dose/Directions    disulfiram 250 MG tablet   Commonly known as:  ANTABUSE   Used for:  Alcoholism in recovery (H)   Started by:  Dara Pope PA-C        Dose:  250 mg   Take 1 tablet (250 mg) by mouth daily   Quantity:  30 tablet   Refills:  3         These medicines have changed or have updated prescriptions.        Dose/Directions    amitriptyline 50 MG tablet   Commonly known as:  ELAVIL   This may have changed:    - how much to take  - when to take this  - additional instructions   Used for:  Idiopathic insomnia        Take 3 tablets by mouth at bed time.   Quantity:  90 tablet   Refills:  11         Stop taking these medicines if you haven't already. Please contact your " care team if you have questions.     montelukast 10 MG tablet   Commonly known as:  SINGULAIR   Stopped by:  Dara Pope PA-C           triamcinolone 55 MCG/ACT Inhaler   Commonly known as:  NASACORT AQ   Stopped by:  Dara Pope PA-C                Where to get your medicines      These medications were sent to [facility] [address] [address]     Phone:  [phone]     disulfiram 250 MG tablet                Primary Care Provider Office Phone # Fax #    Dara Pope PA-C [phone]       [address] [place] [address]        Equal Access to Services     KWADWO COULTER : Hadii aad ku hadasho Soslimeali, waaxda luqadaha, qaybta kaalmada adeegyada, bess melara. So [facility] [phone].    ATENCIÓN: Si habla español, tiene a melton disposición servicios gratuitos de asistencia lingüística. [facility] [phone].    We comply with applicable federal civil rights laws and [place] laws. We do not discriminate on the basis of race, color, national origin, age, disability sex, sexual orientation or gender identity.            Thank you!     Thank you for choosing [facility]  for your care. Our goal is always to provide you with excellent care. Hearing back from our patients is one way we can continue to improve our services. Please take a few minutes to complete the written survey that you may receive in the mail after your visit with us. Thank you!             Your Updated Medication List - Protect others around you: Learn how to safely use, store and throw away your medicines at www.disposemymeds.org.          This list is accurate as of: 8/17/17  4:23 PM.  Always use your most recent med list.                   Brand Name Dispense Instructions for use Diagnosis    ** PATIENT ALERT **      MAXIMUM Ibuprofen FROM ALL SOURCES SHOULD NOT EXCEED 4000MG IN  24 HOURS        amitriptyline 50 MG tablet    ELAVIL    90 tablet    Take 3 tablets by mouth at bed time.    Idiopathic insomnia       disulfiram 250 MG tablet    ANTABUSE    30 tablet    Take 1 tablet (250 mg) by mouth daily    Alcoholism in recovery (H)       levonorgestrel 20 MCG/24HR IUD    MIRENA    1 each    1 each (20 mcg) by Intrauterine route once for 1 dose    Encounter for IUD removal and reinsertion       temazepam 15 MG capsule    RESTORIL    60 capsule    Take 1-2 capsules by mouth 15-30 minutes before bed.  Start with 1 capsule, ok to increase to 2 capsules if residual insomnia after 3-4 days.    Persistent insomnia, Major depressive disorder, recurrent episode, mild (H), Generalized anxiety disorder

## 2017-08-21 ENCOUNTER — HOSPITAL ENCOUNTER (OUTPATIENT)
Dept: PHYSICAL THERAPY | Facility: CLINIC | Age: 47
Setting detail: THERAPIES SERIES
End: 2017-08-21
Attending: PHYSICIAN ASSISTANT
Payer: COMMERCIAL

## 2017-08-21 PROCEDURE — 97140 MANUAL THERAPY 1/> REGIONS: CPT | Mod: GP | Performed by: PHYSICAL THERAPIST

## 2017-08-21 PROCEDURE — 40000718 ZZHC STATISTIC PT DEPARTMENT ORTHO VISIT: Performed by: PHYSICAL THERAPIST

## 2017-08-21 PROCEDURE — 97110 THERAPEUTIC EXERCISES: CPT | Mod: GP | Performed by: PHYSICAL THERAPIST

## 2017-08-21 NOTE — PROGRESS NOTES
Outpatient Physical Therapy Progress Note     Patient: Karishma Kimball  : 1970    Beginning/End Dates of Reporting Period:  17 to 2017    Referring Provider: Dara Pope PA-C    Therapy Diagnosis: Cervical radiculopathy     Client Self Report: Had a stressful weekend, did not stretch once.  Neck is a little tight today, but overall feeling ~60% improvement.    Objective Measurements:  Objective Measure: Cervical AROM pre treatment  Details: Right rotation=65 deg; Left rotation=66 deg; Right sidebend=10 deg; Left sidebend=15 deg  Objective Measure: Cervical AROM post treatment  Details: Right rotation=72 deg; Left rotation=70 deg  Objective Measure: ULTT  Details: (-) ulnar sukhwinder  Objective Measure: MMT  Details: GH ER=4+/5 sukhwinder pain free  Objective Measure: Neck Disability Index  Details: 16% disability (47% improvement from initial evaluation)     Goals:  Goal Identifier turning head for driving,   Goal Description Following PT interventions, pt will increase B cervical rot AROM to 65* to have less difficultyturning head for driving   Target Date 17   Date Met  17   Progress:     Goal Identifier less frequent R arm pain   Goal Description Following PT interventions, pt will test neg for R ulnar ULTT for less frequent R arm pain   Target Date 17   Date Met   17   Progress:     Goal Identifier lift heavy dishes into cupboard   Goal Description Following PT interventions, pt will increase B shoulder ER strength to 4/5 MMT grade to be able to lift heavy dishes into cupboard   Target Date 17   Date Met  17   Progress:     Goal Identifier ride motorcycle   Goal Description Following PT interventions, pt will score 15% on NDI to attempt to ride motorcycle   Target Date 17 (Does not want to attempt the motorcycle this year)   Date Met      Progress:     Progress Toward Goals:   Progress this reporting period: Karishma has attended 6 physical therapy  sessions to address her neck and UE symptoms.  She has made good progress with upper extremity neurodynamics, cervical rotation AROM, and reduced pain when driving.  She has met 3 of 4 goals; patient states she is not certain she wants to attempt to meet her 4th goal this year (motorcycle riding).      Plan:  At this time pt would like to reduce PT frequency.  She is not scheduling an appointment unless symptoms do not continue to resolve with her HEP.    Discharge:  No; chart to remain open x4 weeks    Leana Ricks, PT, DPT  Doctor of Physical Therapy #[id number]  [facility]  [phone]  [email or web address]    Per 10/10/17: Pt has not followed up with skilled PT and will be officially discharged at this time.  Aubrey Adams, PT, DPT   Doctor of Physical Therapy # [id number]  [facility]  [phone]

## 2017-08-31 PROCEDURE — 95803 ACTIGRAPHY TESTING: CPT | Performed by: FAMILY MEDICINE

## 2017-09-01 ENCOUNTER — DOCUMENTATION ONLY (OUTPATIENT)
Dept: SLEEP MEDICINE | Facility: CLINIC | Age: 47
End: 2017-09-01

## 2017-09-01 NOTE — PROGRESS NOTES
SUBJECTIVE:  Chief Complaint   Patient presents with     Sleep Study     drop off actigraphy       OBJECTIVE:  actigraphy  returned to clinic today September 1, 2017.    ASSESSMENT/PLAN:  Karishma has an appointment with provider to review results.   Originals were sent to scanning.   Copy given to provider.

## 2017-09-15 ENCOUNTER — OFFICE VISIT (OUTPATIENT)
Dept: SLEEP MEDICINE | Facility: CLINIC | Age: 47
End: 2017-09-15
Payer: COMMERCIAL

## 2017-09-15 VITALS — BODY MASS INDEX: 23.22 KG/M2 | HEIGHT: 64 IN | OXYGEN SATURATION: 100 % | WEIGHT: 136 LBS | HEART RATE: 71 BPM

## 2017-09-15 DIAGNOSIS — F41.1 GENERALIZED ANXIETY DISORDER: Primary | ICD-10-CM

## 2017-09-15 PROCEDURE — 99214 OFFICE O/P EST MOD 30 MIN: CPT | Performed by: FAMILY MEDICINE

## 2017-09-15 RX ORDER — LORAZEPAM 0.5 MG/1
TABLET ORAL
Qty: 60 TABLET | Refills: 0 | Status: SHIPPED | OUTPATIENT
Start: 2017-09-15 | End: 2017-12-07

## 2017-09-15 NOTE — NURSING NOTE
"Chief Complaint   Patient presents with     Study Results     Discuss actigraph results       Initial Pulse 71  Ht 1.626 m (5' 4\")  Wt 61.7 kg (136 lb)  SpO2 100%  BMI 23.34 kg/m2 Estimated body mass index is 23.34 kg/(m^2) as calculated from the following:    Height as of this encounter: 1.626 m (5' 4\").    Weight as of this encounter: 61.7 kg (136 lb).  Medication Reconciliation: complete   "

## 2017-09-15 NOTE — PATIENT INSTRUCTIONS
"It was good to see you today.  The watch test that we did does show that your brain appears to getting adequate sleep, but the sleep diaries show that a definite mismatch with what your brain is perceiving of your sleep.  This is a known problem and is called \"sleep state misperception\" or an old term was \"paradoxical insomnia\".  Usually this is due to underlying anxiety or depression, with anxiety clearly being part of the story.    For anxiety that is affecting life on a daily basis, I do recommend starting some treatment.  For this, I like start a controller medicine that will help symptoms down the road and long-term, but is not as helpful in the short term.  For the short term (i.e. Today), I recommend a course of a second medication for 1-2 months.    So today, we will start two medications, and will continue with no changes to night-time medications.    1.)  The controller medication (long term medicine) is called Sertraline.  We will start with 25mg PO in the morning for 1 week, then increase to 50mg in the morning.    2.)  The short term medication to help today, I usually use a low dose of Lorazepam, 0.5mg taken up to twice a day as needed.    Your Body mass index is 23.34 kg/(m^2).  Weight management is a personal decision.  If you are interested in exploring weight loss strategies, the following discussion covers the approaches that may be successful. Body mass index (BMI) is one way to tell whether you are at a healthy weight, overweight, or obese. It measures your weight in relation to your height.  A BMI of 18.5 to 24.9 is in the healthy range. A person with a BMI of 25 to 29.9 is considered overweight, and someone with a BMI of 30 or greater is considered obese. More than two-thirds of American adults are considered overweight or obese.  Being overweight or obese increases the risk for further weight gain. Excess weight may lead to heart disease and diabetes.  Creating and following plans for healthy " eating and physical activity may help you improve your health.  Weight control is part of healthy lifestyle and includes exercise, emotional health, and healthy eating habits. Careful eating habits lifelong are the mainstay of weight control. Though there are significant health benefits from weight loss, long-term weight loss with diet alone may be very difficult to achieve- studies show long-term success with dietary management in less than 10% of people. Attaining a healthy weight may be especially difficult to achieve in those with severe obesity. In some cases, medications, devices and surgical management might be considered.  What can you do?  If you are overweight or obese and are interested in methods for weight loss, you should discuss this with your provider.     Consider reducing daily calorie intake by 500 calories.     Keep a food journal.     Avoiding skipping meals, consider cutting portions instead.    Diet combined with exercise helps maintain muscle while optimizing fat loss. Strength training is particularly important for building and maintaining muscle mass. Exercise helps reduce stress, increase energy, and improves fitness. Increasing exercise without diet control, however, may not burn enough calories to loose weight.       Start walking three days a week 10-20 minutes at a time    Work towards walking thirty minutes five days a week     Eventually, increase the speed of your walking for 1-2 minutes at time    In addition, we recommend that you review healthy lifestyles and methods for weight loss available through the National Institutes of Health patient information sites:  http://win.niddk.nih.gov/publications/index.htm    And look into health and wellness programs that may be available through your health insurance provider, employer, local community center, or sury club.

## 2017-09-15 NOTE — MR AVS SNAPSHOT
"              After Visit Summary   9/15/2017    Karishma Kimball    MRN: [id number]           Patient Information     Date Of Birth          1970        Visit Information        Provider Department      9/15/2017 4:00 PM Prakash Blake MD [facility]        Care Instructions    It was good to see you today.  The watch test that we did does show that your brain appears to getting adequate sleep, but the sleep diaries show that a definite mismatch with what your brain is perceiving of your sleep.  This is a known problem and is called \"sleep state misperception\" or an old term was \"paradoxical insomnia\".  Usually this is due to underlying anxiety or depression, with anxiety clearly being part of the story.    For anxiety that is affecting life on a daily basis, I do recommend starting some treatment.  For this, I like start a controller medicine that will help symptoms down the road and long-term, but is not as helpful in the short term.  For the short term (i.e. Today), I recommend a course of a second medication for 1-2 months.    So today, we will start two medications, and will continue with no changes to night-time medications.    1.)  The controller medication (long term medicine) is called Sertraline.  We will start with 25mg PO in the morning for 1 week, then increase to 50mg in the morning.    2.)  The short term medication to help today, I usually use a low dose of Lorazepam, 0.5mg taken up to twice a day as needed.          Follow-ups after your visit        Who to contact     If you have questions or need follow up information about today's clinic visit or your schedule please contact [facility] directly at [phone].  Normal or non-critical lab and imaging results will be communicated to you by MyChart, letter or phone within 4 business days after the clinic has received the results. If you do not hear from us within 7 days, please contact the clinic " "through [employer] or phone. If you have a critical or abnormal lab result, we will notify you by phone as soon as possible.  Submit refill requests through [employer] or call your pharmacy and they will forward the refill request to us. Please allow 3 business days for your refill to be completed.          Additional Information About Your Visit        [employer] Information     [employer] lets you send messages to your doctor, view your test results, renew your prescriptions, schedule appointments and more. To sign up, go to [email or web address] . Click on \"Log in\" on the left side of the screen, which will take you to the Welcome page. Then click on \"Sign up Now\" on the right side of the page.     You will be asked to enter the access code listed below, as well as some personal information. Please follow the directions to create your username and password.     Your access code is: [email or web address]  Expires: 2017  4:47 PM     Your access code will  in 90 days. If you need help or a new code, please call your [place] clinic or [phone].        Care EveryWhere ID     This is your Care EveryWhere ID. This could be used by other organizations to access your [place] medical records  [id number]        Your Vitals Were     Pulse Height Pulse Oximetry BMI (Body Mass Index)          71 1.626 m (5' 4\") 100% 23.34 kg/m2         Blood Pressure from Last 3 Encounters:   17 130/88   17 110/62   17 116/84    Weight from Last 3 Encounters:   09/15/17 61.7 kg (136 lb)   17 61.7 kg (136 lb)   17 62.6 kg (138 lb)              Today, you had the following     No orders found for display         Today's Medication Changes          These changes are accurate as of: 9/15/17  4:47 PM.  If you have any questions, ask your nurse or doctor.               These medicines have changed or have updated prescriptions.        Dose/Directions    amitriptyline 50 MG tablet   Commonly known as:  ELAVIL   This may " have changed:    - how much to take  - when to take this  - additional instructions   Used for:  Idiopathic insomnia        Take 3 tablets by mouth at bed time.   Quantity:  90 tablet   Refills:  11                Primary Care Provider Office Phone # Fax #    Dara Pope PA-C [phone] [address] [address]        Equal Access to Services     [facility]: Hadii aad ku hadasho Soomaali, waaxda luqadaha, qaybta kaalmada adeegyada, waxay idiin hayaan adeeg khherbersh latrumann . So [facility] [phone].    ATENCIÓN: Si dayola ene, tiene a melton disposición servicios gratuitos de asistencia lingüística. [facility] [phone].    We comply with applicable federal civil rights laws and [place] laws. We do not discriminate on the basis of race, color, national origin, age, disability sex, sexual orientation or gender identity.            Thank you!     Thank you for choosing [facility]  for your care. Our goal is always to provide you with excellent care. Hearing back from our patients is one way we can continue to improve our services. Please take a few minutes to complete the written survey that you may receive in the mail after your visit with us. Thank you!             Your Updated Medication List - Protect others around you: Learn how to safely use, store and throw away your medicines at www.disposemymeds.org.          This list is accurate as of: 9/15/17  4:47 PM.  Always use your most recent med list.                   Brand Name Dispense Instructions for use Diagnosis    ** PATIENT ALERT **      MAXIMUM Ibuprofen FROM ALL SOURCES SHOULD NOT EXCEED 4000MG IN 24 HOURS        amitriptyline 50 MG tablet    ELAVIL    90 tablet    Take 3 tablets by mouth at bed time.    Idiopathic insomnia       disulfiram 250 MG tablet    ANTABUSE    30 tablet    Take 1 tablet (250 mg) by mouth daily    Alcoholism in recovery (H)       levonorgestrel 20 MCG/24HR IUD    MIRENA     1 each    1 each (20 mcg) by Intrauterine route once for 1 dose    Encounter for IUD removal and reinsertion       temazepam 15 MG capsule    RESTORIL    60 capsule    Take 1-2 capsules by mouth 15-30 minutes before bed.  Start with 1 capsule, ok to increase to 2 capsules if residual insomnia after 3-4 days.    Persistent insomnia, Major depressive disorder, recurrent episode, mild (H), Generalized anxiety disorder

## 2017-09-15 NOTE — TELEPHONE ENCOUNTER
SUBJECTIVE:  Chief Complaint   Patient presents with     Sleep Problem     Per Dr. Blake request      OBJECTIVE:  Karishma was seen in the office today 09/15/2017. Karishma would like us/him to call Karishma on Monday to see how she is doing.     ASSESSMENT/PLAN:  Please call [phone] (home)    Telephone Information:   Mobile [phone]

## 2017-09-18 ENCOUNTER — TELEPHONE (OUTPATIENT)
Dept: SLEEP MEDICINE | Facility: CLINIC | Age: 47
End: 2017-09-18
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
## 2017-09-18 NOTE — TELEPHONE ENCOUNTER
Spoke with patient and she has not started Zoloft as she is concerned with how it will affect her. We spoke about starting with a very low dose as directed by Dr. Blake. She may try it at bedtime first, and see how she does? At this time, she is not using Lorazepam.  I will postpone note for 2 weeks and see how she is doing.    Marsha Philippe CMA

## 2017-09-25 NOTE — TELEPHONE ENCOUNTER
"Karishma called into the clinic to give us an update of this last week.   She states she would take qty 3 amitriptyline at 11 pm and she would sleep from 12:30 AM - 3:30 AM. When she was weaning off of it she felt extreme mood swings of being angry and sad. The same happened with Zoloft. She is afraid of starting a new treatment because she feels that her anxiety might get worse from it even though it's supposed to treat it. She doesn't understand how something \"that is not for sleep\" is going to help her sleep.     Only last night she took qty 2 temazepam (alone, not with amitriptyline) and she slept from 11:30 PM to 4:30 AM with no relief. She said she would lay in bed until she had to get up to get ready for work. She is worried about her recent Actigraph results because she feels that it recorded sleep time when she was just laying really still. The reason she knows what time it is, is because she gets up and goes into the kitchen where the clock is. She has a clock in her room but it's hidden so that the light doesn't affect her sleep.     Please review and advise. 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

## 2017-10-03 NOTE — TELEPHONE ENCOUNTER
"We had discussed our concern that her insomnia was being heavily affected by her anxiety, so is why we had focused on anti-anxiety treatments as opposed to more \"sleep\" medication.  It was unclear to me if she had started the Zoloft or tried the Lorazepam we had recommended at the last visit.  If the amitriptyline was not highly effective and sounds as if the temazepam was not highly effective, I would likely consider an attempt to get coverage for suvorexant, a sleep medication that does not have any concerns with addiction and works in a different brain chemical system than our other choices, so may be more effective.  "

## 2017-10-03 NOTE — TELEPHONE ENCOUNTER
Patient called back and said she was afraid the Zoloft and Lorazepam would cause more anxiety so she did not even try it. She will start tonight and get back to us in a few weeks. .

## 2017-10-10 NOTE — TELEPHONE ENCOUNTER
"Karishma returned call and feels that she has \"cracked the code\" She is feeling great! She states she had a good night on Friday, Saturday, Sunday, and Monday night (last night, today is Tuesday) She tries to take sertraline in the am (between 7:30 - 8:30am) Today she took it around noon. Before she felt good she would worry if she didn't take a medication right away in the morning but she seems to be less worried about taking it later in the day now. She is wondering how long does it stay active in your system? She feels \"flooded with anxiety\" later in the day. I asked her to tell me more about this. She states she would feel no anxiety in the morning shortly after taking the sertraline but around 6 pm she would feel a flood of anxiety. She is is wondering how long does this generally stay active in a person's system?     She informed me that she has been taking Temazepam around 1 - 1:30 am since Friday. She has been falling asleep at 2 am and will sleep through the night until 730 am. When she saw the physician he had requested she go to bed around 11 but she just can't seem to fall asleep at that time. She has been sleeping from 2 -7:30 am and feels really good.     She also wants to continue the amitriptyline at bedtime. She takes two Temazepam and one or two Amitriptyline.     I asked her about Lorazepam as that is also prescribed in her mediation list by Dr. Blake. She said the Lorazepam just sits on her shelf and she hasn't needed it.     Dr. Blake, Are there any changes you recommend? She is feeling good overall. When would you like her to follow up?    "

## 2017-10-12 NOTE — TELEPHONE ENCOUNTER
I am very glad to hear about the significant improvement.  I would recommend we continue the current regiment of the sertraline and is very safe to take it around noon.  I am also comfortable with continuing the amitriptyline and the temazepam.  If doing well, I would be ok with following up in 3 months.

## 2017-10-27 ENCOUNTER — TELEPHONE (OUTPATIENT)
Dept: SLEEP MEDICINE | Facility: CLINIC | Age: 47
End: 2017-10-27

## 2017-11-30 ENCOUNTER — TELEPHONE (OUTPATIENT)
Dept: SLEEP MEDICINE | Facility: CLINIC | Age: 47
End: 2017-11-30

## 2017-11-30 ASSESSMENT — PATIENT HEALTH QUESTIONNAIRE - PHQ9: SUM OF ALL RESPONSES TO PHQ QUESTIONS 1-9: 19

## 2017-11-30 NOTE — TELEPHONE ENCOUNTER
SUBJECTIVE:  Chief Complaint   Patient presents with     Sleep Problem     Medication concerns; side effects.      OBJECTIVE:  I received a call from Karishma. She states she is having side effects from her sertraline/zoloft. She told me that she shakes really bad, has panic attacks and yesterday she hyperventilated. She also said she has feelings that she doesn't want to live. I asked when the symptoms started and she said her co-workers have made comment about 2.5 - 3 weeks. She takes one sertraline/zoloft in the morning at 8 am.     She said she was weaning off amitriptyline but her feelings of not wanting to see morning increased. She increased her dose to the original strength (three 50 mg tablets) She feels she is only sleeping x 3 hours each night and feels that this could be making her symptoms worse.   She also refilled her temazepam but has not been taking this medication.     ASSESSMENT/PLAN:  Will forward for review.

## 2017-11-30 NOTE — TELEPHONE ENCOUNTER
I returned patient's call and informed her to discontinue current low dose of sertraline/zoloft.   From a sleep standpoint; patient to continue amitriptyline at current dose and ok to add one temazepam.   I informed Karishma that a nurse will be reaching out to her shortly to discuss her side effects further and informed her to go to E.R if side effects/ thoughts/ feelings continue.   Karishma was calm on the phone and verbalized understanding.

## 2017-11-30 NOTE — TELEPHONE ENCOUNTER
Spoke with Elle.  Will initiate protocol for suicidal ideation, RN will call patient.  Does not appear to be an imminent danger to self or others, so don't see indication for 72 hour hold.  Will recommend tapering off the sertraline, ok to continue amitriptyline, ok to use Temazepam, follow-up with Dara Pope.

## 2017-11-30 NOTE — TELEPHONE ENCOUNTER
Spoke with pt and she was calm in telling her situation.  Pt was on Zoloft for 2 months and started to have side effects of being very shaky and unable to sleep.  She stopped the Zoloft 2 days ago with no weaning.  Pt's normal sleep pattern is from 3AM to 9AM but with kids and work she is unable to sleep those hours.  Pt feels sleep deprived.  Pt will restart the Amitriptyline 50 mg 3 tabs @ night.  Did not want to restart Temazepam since she is an alcoholic and not wanting to be on this med.  Dr. Blake told pt it was ok to take Temazepam 15 mg @ night.  Pt will f/u with Dara Pope,PCP soon and TONY Mcmanus will f/u with pt tomorrow.  [place]pa[place]en

## 2017-12-01 ENCOUNTER — CARE COORDINATION (OUTPATIENT)
Dept: CARE COORDINATION | Facility: CLINIC | Age: 47
End: 2017-12-01

## 2017-12-01 NOTE — PROGRESS NOTES
Clinic Care Coordination Contact  [facility]/Voicemail    Referral Source: Specialist - sleep clinic  Clinical Data: Care Coordinator Outreach  Outreach attempted x 1.  Left message on voicemail with call back information and requested return call.  Plan: Care Coordinator will mail out care coordination introduction letter with care coordinator contact information and explanation of care coordination services. Care Coordinator will try to reach patient again in 1-2 business days.  ROSS Spaulding, Clinic Care Coordinator 12/1/2017   10:03 AM  [phone]

## 2017-12-01 NOTE — LETTER
[place] [id number] [address]  [phone]      12/1/2017      Karishma Kimball  [address] [address]      Dear  Karishma,    I am the Clinic Care Coordinator that works with your primary care provider's clinic. I wanted to introduce myself and provide you with my contact information for you to be able to call me with any questions or concerns. Below is a description of what Clinic Care Coordination is and how I can further assist you.     The Clinic Care Coordinator role is a Registered Nurse and/or  who understands the health care system. The goal of Clinic Care Coordination is to help you manage your health and improve access to the [facility] in the most efficient manner.  The Registered Nurse can assist you in meeting your health care goals by providing education, coordinating services, and strengthening the communication among your providers. The  can assist you with financial, behavioral, psychosocial, and chemical dependency and counseling/psychiatric resources.    Please feel free to keep this letter and contact information to contact me at [phone] with any further questions or concerns that may arise. We at [facility] are focused on providing you with the highest-quality healthcare experience possible and that all starts with you.       Sincerely,     Kallie Mcmanus, [facility]  Clinic Care Coordination  [phone]      Enclosed: I have enclosed a copy of a 24 Hour Access Plan. This has helpful phone numbers for you to call when needed. Please keep this in an easy to access place to use as needed.

## 2017-12-01 NOTE — PROGRESS NOTES
Clinic Care Coordination Contact  OUTREACH    Referral Information:  Referral Source: Specialist  Reason for Contact: initial        Universal Utilization:   ED Visits in last year: 0  Hospital visits in last year: 0  Last PCP appointment: 08/17/17  Missed Appointments: 0       Clinical Concerns:  Current Medical Concerns: pt works with sleep medicine to help her with her insomnia.       Current Behavioral Concerns: pt reports that she is having issues with her Anxiety.  She did not like the side effects of the Zoloft so stopped and does not want to go on any other medications at this time.  She said talking with people or being at work helps with the shaking and as we were talking her voice cleared and she said she stopped shaking.  She has been to counseling and stopped a couple years ago.  She had been going for 5 years and had EMDR tx.  She tries to practice some of the techniques when she remembers them when having issues.  She denied any thoughts of self harm/harm to others and sounded very calm during the conversation.  Pt did not want to consider counseling stating that she has 3 sponsors at the current time.  One sponsor she has had for a long time and is not as supportive as she is getting dementia so she added a sponsor form [employer].  Pt also has an alanon sponsor whom she said helps her understand her in-law's.  Pt plans to continue with the sponsors.  Attempted to talk about the possible challenges she may be having due to spouses use of alcohol and she changed the topic.       Pt is self limiting her caffeine to help with the anxiety and shaking.  She is not tracking what is causing the shaking/anxiety nor does she have any ideas of what the triggers are.  Encouraged pt to track her anxiety with what she was doing when it occurred and what she did to try and relieve it.  Pt said she is doing some journaling and will start to track her anxiety.      Education Provided to patient: track her anxiety and what  is going on when it occurs and what helps/does not help relieve.  Consider counsling      Clinical Pathway: None    Medication Management:  Pt stopped the zoloft before talking with any provider and did not titrate.  She administers her own medications.  Does not want to add any medications     Functional Status:  Mobility Status: Independent     Transportation: no concerns      pt is able to manage her daily routine and work schedule     Psychosocial:  Current living arrangement:: I live in a private home with family  Financial/Insurance: no identified needs  Pt has 3 sponsors to help her with the continued recovery from alcohol use.      Resources and Interventions:  Current Resources: n/a      Goals:       n/a - will discuss on next call     Barriers: alcoholism in recovery, anxiety  Strengths: multiple sponsors, desire to remain sober  Patient/Caregiver understanding: continue to work with sponsor's      Plan: pt to continue to work with sponsors. Pt to try and track her anxiety.  SW to call in about one week.     ROSS Spaulding, Clinic Care Coordinator 12/1/2017   1:41 PM  [phone]

## 2017-12-01 NOTE — LETTER
Health Care Home - Access Care Plan    About Me  Patient Name:  Karishma Kimball    YOB: 1970  Age:                            47 year old   [facility] MRN:         [id number] Telephone Information:     Home Phone [phone]   Mobile [phone]       Address:    [address] [address] [address] Email address:  No e-mail address on record      Emergency Contact(s)  Name Relationship Lgl Grd Work Phone Home Phone Mobile Phone   1. STEVAN KIMBALL Spouse   [phone]   2. ULI HOLLIDAY  Mother   [phone]             Health Maintenance:      My Access Plan  Medical Emergency 911   Questions or concerns during clinic hours Primary Clinic Line, I will call the clinic directly: Primary Clinic: [facility]- [phone]   24 Hour Appointment Line [phone] or  [phone] [place] ([phone])  (toll free)   24 Hour Nurse Line [phone] (toll free)   Questions or concerns outside clinic hours 24 Hour Appointment Line, I will call the after-hours on-call line:   [facility] [phone] or [phone] ([phone]) (toll-free)   Preferred Urgent Care     Preferred Hospital     Preferred Pharmacy [facility] [place], [place] - [address] Behavioral Health Crisis Line The National Suicide Prevention Lifeline at 1-716.609.9287 or 116     My Care Team Members  Patient Care Team       Relationship Specialty Notifications Start End    Dara Pope PA-C PCP - General Physician Assistant  3/30/17     Phone: [phone] Fax: [phone] [address] [place] [address]        My Medical and Care Information  Problem List   Patient Active Problem List   Diagnosis     Generalized anxiety disorder     IUD (intrauterine device) in place     Mild major depression (H)     Brachial neuritis or radiculitis     Dissociative disorder     History of loop electrical excision procedure (LEEP)      Hypertension     Persistent insomnia     Substance abuse     Alcoholism in recovery (H)      Current Medications and Allergies:  See printed Medication Report

## 2017-12-07 ENCOUNTER — OFFICE VISIT (OUTPATIENT)
Dept: FAMILY MEDICINE | Facility: CLINIC | Age: 47
End: 2017-12-07
Payer: COMMERCIAL

## 2017-12-07 VITALS
SYSTOLIC BLOOD PRESSURE: 136 MMHG | TEMPERATURE: 97.4 F | WEIGHT: 137.2 LBS | BODY MASS INDEX: 23.42 KG/M2 | HEART RATE: 104 BPM | DIASTOLIC BLOOD PRESSURE: 88 MMHG | HEIGHT: 64 IN

## 2017-12-07 DIAGNOSIS — F10.21 ALCOHOLISM IN RECOVERY (H): Primary | ICD-10-CM

## 2017-12-07 DIAGNOSIS — F51.01 IDIOPATHIC INSOMNIA: ICD-10-CM

## 2017-12-07 DIAGNOSIS — R45.86 MOOD SWINGS: ICD-10-CM

## 2017-12-07 PROCEDURE — 99214 OFFICE O/P EST MOD 30 MIN: CPT | Performed by: PHYSICIAN ASSISTANT

## 2017-12-07 RX ORDER — AMITRIPTYLINE HYDROCHLORIDE 50 MG/1
TABLET ORAL
Qty: 90 TABLET | Refills: 11 | Status: CANCELLED | OUTPATIENT
Start: 2017-12-07

## 2017-12-07 NOTE — Clinical Note
Call in 2-3 weeks to see how she's feeling with being off zoloft for awhile.  Was not doing well on zoloft.  Repeat phq/eun

## 2017-12-07 NOTE — PROGRESS NOTES
SUBJECTIVE:   Karishma Kimball is a 47 year old female who presents to clinic today for the following health issues:      Anxiety Follow-Up    Status since last visit: Improved    Other associated symptoms:None    Complicating factors:   Significant life event: No   Current substance abuse: None  Depression symptoms: Yes after stopping the zoloft.  Didn't titrate.  Had received a few phone calls in the last week and told her to come in  RALEIGH-7 SCORE 8/27/2015 2/19/2016 6/5/2017   Total Score - - -   Total Score 0 0 3   Total Score - - -   Some encounter information is confidential and restricted. Go to Review Flowsheets activity to see all data.       GAD7  * Saw the sleep specialist, was prescribed sertraline.  Thought that taking away the anxiety would help her sleep.  Is still taking amitriptyline at night.  Was having a lot of side effects and discontinued it.      * Was told she can take 1 tablet temazepam with amitriptyline.  Is really trying not to use temazepam.  Feels that her biological clock is off.  Is still up at 1 AM.      Zoloft prescribed by Dr Blake 9/15 but started zoloft maybe Oct 1st.  Didn't feel she saw benefit but not really bad shaking and unable to write for past 3 wks.  Notes 1 day she did hyperventilate, but doesn't think happened other days.      Feels needs something to fall asleep then sleeps well from 3 am to 10 am, but can only do this 2x/wk.  Has tried sleep restriction and melatonin in past.  Avoiding her temazepam from Dr Blake.  Continues her sobriety, and no longer needing antabuse since her  has quit drinking.      Feels mood was great with just amitriptyline in past and doing really well last few days, since quitting zoloft.  No thoughts self harm.    Problem list and histories reviewed & adjusted, as indicated.  Additional history: as documented    BP Readings from Last 3 Encounters:   12/07/17 136/88   08/17/17 130/88   07/20/17 110/62    Wt Readings from  "Last 3 Encounters:   12/07/17 137 lb 3.2 oz (62.2 kg)   09/15/17 136 lb (61.7 kg)   08/17/17 136 lb (61.7 kg)           Reviewed and updated as needed this visit by clinical staff     Reviewed and updated as needed this visit by Provider         ROS:  Constitutional, and psych systems are negative, except as otherwise noted.      OBJECTIVE:   /88 (BP Location: Right arm, Patient Position: Chair, Cuff Size: Adult Regular)  Pulse 104  Temp 97.4  F (36.3  C) (Tympanic)  Ht 5' 4\" (1.626 m)  Wt 137 lb 3.2 oz (62.2 kg)  BMI 23.55 kg/m2  Body mass index is 23.55 kg/(m^2).  GENERAL: healthy, alert and no distress  PSYCH: mentation appears normal, affect a bit stressed    Phq/eun today completed but she states things are 100% different now that off zoloft    ASSESSMENT/PLAN:       ICD-10-CM    1. Alcoholism in recovery (H) F10.20    2. Idiopathic insomnia F51.01    3. Mood swings (H) F39    doubtful zoloft was cause of her symptoms but will see how she does off, and try other med if needed  30 min spent with patient today, over half in discussion of options for sleep.    Patient Instructions   Decided to see how things go with being off zoloft (sertraline)  Staying on amitriptyline for sleep  - at least for now  I am doubtful about your schedule, but we'll let it be until it doesn't seem to work  Do suggest Dr Blake if not sleeping well     Follow up as needed  Nurse will call in 2-3 weeks to double check how you're doing      Dara Pope PA-C  [facility]  "

## 2017-12-07 NOTE — NURSING NOTE
"Chief Complaint   Patient presents with     Anxiety       Initial /88 (BP Location: Right arm, Patient Position: Chair, Cuff Size: Adult Regular)  Pulse 104  Temp 97.4  F (36.3  C) (Tympanic)  Ht 5' 4\" (1.626 m)  Wt 137 lb 3.2 oz (62.2 kg)  BMI 23.55 kg/m2 Estimated body mass index is 23.55 kg/(m^2) as calculated from the following:    Height as of this encounter: 5' 4\" (1.626 m).    Weight as of this encounter: 137 lb 3.2 oz (62.2 kg).  Medication Reconciliation: complete    Health Maintenance that is potentially due pending provider review:  NONE        Is there anyone who you would like to be able to receive your results? No  If yes have patient fill out MACEY      "

## 2017-12-07 NOTE — PATIENT INSTRUCTIONS
Decided to see how things go with being off zoloft (sertraline)  Staying on amitriptyline for sleep  - at least for now  I am doubtful about your schedule, but we'll let it be until it doesn't seem to work  Do suggest Dr Blake if not sleeping well     Follow up as needed  Nurse will call in 2-3 weeks to double check how you're doing

## 2017-12-07 NOTE — MR AVS SNAPSHOT
"              After Visit Summary   12/7/2017    Karishma Kimball    MRN: [id number]           Patient Information     Date Of Birth          1970        Visit Information        Provider Department      12/7/2017 4:00 PM Dara Pope PA-C [facility]        Today's Diagnoses     Idiopathic insomnia          Care Instructions    Decided to see how things go with being off zoloft (sertraline)  Staying on amitriptyline for sleep  - at least for now  I am doubtful about your schedule, but we'll let it be until it doesn't seem to work  Do suggest Dr Blake if not sleeping well     Follow up as needed  Nurse will call in 2-3 weeks to double check how you're doing          Follow-ups after your visit        Who to contact     If you have questions or need follow up information about today's clinic visit or your schedule please contact [facility] directly at [phone].  Normal or non-critical lab and imaging results will be communicated to you by [employer], letter or phone within 4 business days after the clinic has received the results. If you do not hear from us within 7 days, please contact the clinic through [employer] or phone. If you have a critical or abnormal lab result, we will notify you by phone as soon as possible.  Submit refill requests through [employer] or call your pharmacy and they will forward the refill request to us. Please allow 3 business days for your refill to be completed.          Additional Information About Your Visit        [employer] Information     [employer] lets you send messages to your doctor, view your test results, renew your prescriptions, schedule appointments and more. To sign up, go to [email or web address] . Click on \"Log in\" on the left side of the screen, which will take you to the Welcome page. Then click on \"Sign up Now\" on the right side of the page.     You will be asked to enter the access code listed below, as well as some " "personal information. Please follow the directions to create your username and password.     Your access code is: [email or web address]  Expires: 2017  3:47 PM     Your access code will  in 90 days. If you need help or a new code, please call your [place] clinic or [phone].        Care EveryWhere ID     This is your Care EveryWhere ID. This could be used by other organizations to access your [place] medical records  [id number]        Your Vitals Were     Pulse Temperature Height BMI (Body Mass Index)          104 97.4  F (36.3  C) (Tympanic) 5' 4\" (1.626 m) 23.55 kg/m2         Blood Pressure from Last 3 Encounters:   17 136/88   17 130/88   17 110/62    Weight from Last 3 Encounters:   17 137 lb 3.2 oz (62.2 kg)   09/15/17 136 lb (61.7 kg)   17 136 lb (61.7 kg)              Today, you had the following     No orders found for display         Today's Medication Changes          These changes are accurate as of: 17  4:50 PM.  If you have any questions, ask your nurse or doctor.               These medicines have changed or have updated prescriptions.        Dose/Directions    amitriptyline 50 MG tablet   Commonly known as:  ELAVIL   This may have changed:    - how much to take  - when to take this  - additional instructions   Used for:  Idiopathic insomnia        Take 3 tablets by mouth at bed time.   Quantity:  90 tablet   Refills:  11         Stop taking these medicines if you haven't already. Please contact your care team if you have questions.     disulfiram 250 MG tablet   Commonly known as:  ANTABUSE   Stopped by:  Dara Pope PA-C           LORazepam 0.5 MG tablet   Commonly known as:  ATIVAN   Stopped by:  Dara Pope PA-C           sertraline 50 MG tablet   Commonly known as:  ZOLOFT   Stopped by:  Dara Pope PA-C                    Primary Care Provider Office Phone # Fax #    Dara Pope PA-C [phone] "       [address] [place] [address]        Equal Access to Services     KWADWO COULTER : Hadii melodie valenzuela loufranck Sheldonali, wamaria camber burnett, toñaandreea goodrichmandoamber cornejo, bess phamherbersarika melara. So [facility] [phone].    ATENCIÓN: Si habla español, tiene a melton disposición servicios gratuitos de asistencia lingüística. Deanneame al [phone].    We comply with applicable federal civil rights laws and [place] laws. We do not discriminate on the basis of race, color, national origin, age, disability, sex, sexual orientation, or gender identity.            Thank you!     Thank you for choosing [facility]  for your care. Our goal is always to provide you with excellent care. Hearing back from our patients is one way we can continue to improve our services. Please take a few minutes to complete the written survey that you may receive in the mail after your visit with us. Thank you!             Your Updated Medication List - Protect others around you: Learn how to safely use, store and throw away your medicines at www.disposemymeds.org.          This list is accurate as of: 12/7/17  4:50 PM.  Always use your most recent med list.                   Brand Name Dispense Instructions for use Diagnosis    ** PATIENT ALERT **      MAXIMUM Ibuprofen FROM ALL SOURCES SHOULD NOT EXCEED 4000MG IN 24 HOURS        amitriptyline 50 MG tablet    ELAVIL    90 tablet    Take 3 tablets by mouth at bed time.    Idiopathic insomnia       levonorgestrel 20 MCG/24HR IUD    MIRENA    1 each    1 each (20 mcg) by Intrauterine route once for 1 dose    Encounter for IUD removal and reinsertion       temazepam 15 MG capsule    RESTORIL    60 capsule    Take 1-2 capsules by mouth 15-30 minutes before bed.  Start with 1 capsule, ok to increase to 2 capsules if residual insomnia after 3-4 days.    Persistent insomnia, Major depressive disorder, recurrent episode, mild (H), Generalized anxiety disorder

## 2017-12-08 ENCOUNTER — CARE COORDINATION (OUTPATIENT)
Dept: CARE COORDINATION | Facility: CLINIC | Age: 47
End: 2017-12-08

## 2017-12-08 NOTE — PROGRESS NOTES
Clinic Care Coordination Contact  Care Team Conversations    Follow up call placed as pt returned call.  She said she had a good week and thinks a lot of it was getting some good sleep.  She was a little confused about why her PCP still wants her to go back to the sleep doctor and questioned her sleep cycle.  Pt said she is not one to go to bed earlier and up early and it currently works for her family for her to go to bed later and up a little later.  Encouraged pt to continue to work on getting good sleep if she feels that is what is making the difference.  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      Discussed the importance of getting good sleep and her anxiety better under control.  Once she is getting better sleep and her anxiety is trino then we will look at what, if any patterns need to be changed.  She still feels that either the Zoloft was the reason for the shakes and when reading trough some of the common side effects she identified with many of them.  She said they have subsided either all the way or considerably less since the ending of the medications.      Plan:  Pt to continue to work on getting good sleep and decreasing her anxiety.  SW to call in about two weeks to review progress and assist her with evaluating if any changes need to be made.     ROSS Spaulding, Clinic Care Coordinator 12/8/2017   2:35 PM  [phone]

## 2017-12-08 NOTE — PROGRESS NOTES
Clinic Care Coordination Contact  [facility]/Voicemail    Referral Source: Specialist  Clinical Data: Care Coordinator Outreach  Outreach attempted x 1.  Left message on voicemail with call back information and requested return call.  Plan: Care Coordinator mailed out care coordination introduction letter on 12-1-17. Care Coordinator will try to reach patient again in 3-5 business days.  ROSS Spaulding, Clinic Care Coordinator 12/8/2017   9:37 AM  [phone]

## 2017-12-22 ENCOUNTER — CARE COORDINATION (OUTPATIENT)
Dept: CARE COORDINATION | Facility: CLINIC | Age: 47
End: 2017-12-22

## 2017-12-22 NOTE — PROGRESS NOTES
Clinic Care Coordination Contact  Care Team Conversations    Pt reporting that she is doing well.  She is getting good sleep and not noticing her anxiety as much.  She continues to feel that the Zoloft was causing the shaking/tremors.  She has no concerns today with her sobriety and mental health.     Pt continues to work and manage her daily routine.     Plan:  Pt to continue to work on getting good sleep.  Sw to call in about one month for check in and goal setting.     ROSS Spaulding, Clinic Care Coordinator 12/22/2017   9:36 AM  [phone]

## 2018-01-02 ENCOUNTER — TELEPHONE (OUTPATIENT)
Dept: FAMILY MEDICINE | Facility: CLINIC | Age: 48
End: 2018-01-02

## 2018-01-02 NOTE — TELEPHONE ENCOUNTER
Message from Dara:    Call to see how she's feeling with being off zoloft for awhile.  Was not doing well on zoloft.  Repeat phq/eun.

## 2018-01-12 ASSESSMENT — ANXIETY QUESTIONNAIRES
6. BECOMING EASILY ANNOYED OR IRRITABLE: NOT AT ALL
GAD7 TOTAL SCORE: 2
5. BEING SO RESTLESS THAT IT IS HARD TO SIT STILL: NOT AT ALL
2. NOT BEING ABLE TO STOP OR CONTROL WORRYING: NOT AT ALL
1. FEELING NERVOUS, ANXIOUS, OR ON EDGE: SEVERAL DAYS
IF YOU CHECKED OFF ANY PROBLEMS ON THIS QUESTIONNAIRE, HOW DIFFICULT HAVE THESE PROBLEMS MADE IT FOR YOU TO DO YOUR WORK, TAKE CARE OF THINGS AT HOME, OR GET ALONG WITH OTHER PEOPLE: NOT DIFFICULT AT ALL
3. WORRYING TOO MUCH ABOUT DIFFERENT THINGS: NOT AT ALL
7. FEELING AFRAID AS IF SOMETHING AWFUL MIGHT HAPPEN: NOT AT ALL

## 2018-01-12 ASSESSMENT — PATIENT HEALTH QUESTIONNAIRE - PHQ9
SUM OF ALL RESPONSES TO PHQ QUESTIONS 1-9: 2
5. POOR APPETITE OR OVEREATING: SEVERAL DAYS

## 2018-01-12 NOTE — TELEPHONE ENCOUNTER
Pt called. States she is doing very well. Denies concerns. Phq 9 and Andrew scores today of 2. Kaykay Gordon RN

## 2018-01-13 ASSESSMENT — ANXIETY QUESTIONNAIRES: GAD7 TOTAL SCORE: 2

## 2018-01-31 DIAGNOSIS — F33.0 MAJOR DEPRESSIVE DISORDER, RECURRENT EPISODE, MILD (H): Chronic | ICD-10-CM

## 2018-01-31 DIAGNOSIS — G47.00 PERSISTENT INSOMNIA: Chronic | ICD-10-CM

## 2018-01-31 DIAGNOSIS — F41.1 GENERALIZED ANXIETY DISORDER: Chronic | ICD-10-CM

## 2018-01-31 NOTE — TELEPHONE ENCOUNTER
temazepam      Last Written Prescription Date:  06/01/2017  Last Fill Quantity: 60,   # refills: 5  Last Office Visit: 09/15/2017  Future Office visit:       Routing refill request to provider for review/approval because:  Controlled medication

## 2018-02-01 RX ORDER — TEMAZEPAM 15 MG/1
CAPSULE ORAL
Qty: 60 CAPSULE | Refills: 5 | Status: SHIPPED | OUTPATIENT
Start: 2018-02-01 | End: 2018-08-06

## 2018-02-27 ENCOUNTER — CARE COORDINATION (OUTPATIENT)
Dept: CARE COORDINATION | Facility: CLINIC | Age: 48
End: 2018-02-27

## 2018-02-27 NOTE — LETTER
[facility]  [address] [place] [address]  [phone]      3/2/2018      Karishma Kimball  [address] [address] [facility] [address]      Dear  Karishma,      I have been attempting to reach you since our last contact. I would like to continue to work with you on the goals from our last conversation and provide the support you may need. I would appreciate if you would give me a call at [phone] and let me know if you would like to continue working together. I know that there are many things that can affect our ability to communicate and hope we can plan better moving forward.     All of us at [facility] are invested in your health and are here to assist you in meeting your goals.     Sincerely,        Kallie Mcmanus, [facility]  Clinic Care Coordination  [phone]

## 2018-02-27 NOTE — PROGRESS NOTES
Clinic Care Coordination Contact  [facility]/Voicemail    Referral Source: Specialist  Clinical Data: Care Coordinator Outreach  Outreach attempted x 1.  Left message on voicemail with call back information and requested return call.  Plan: Care Coordinator mailed out care coordination introduction letter on 12-21-17. Care Coordinator will try to reach patient again in 3-5 business days.  ROSS Spaulding, Clinic Care Coordinator 2/27/2018   4:02 PM  [phone]

## 2018-03-02 NOTE — PROGRESS NOTES
Clinic Care Coordination Contact  [facility]/Voicemail    Referral Source: Specialist  Clinical Data: Care Coordinator Outreach  Outreach attempted x 2.  Left message on voicemail with call back information and requested return call.  Plan: Care Coordinator will send unable to contact letter. Care Coordinator will try to reach patient again in 10-15 business days.  ROSS Spaulding, Clinic Care Coordinator 3/2/2018   8:54 AM  [phone]

## 2018-03-02 NOTE — PROGRESS NOTES
Clinic Care Coordination Contact  Care Team Conversations    Pt returned call.  She said she was doing well.  She found a sponsor in the al-anon group and it is making more sense to her.  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      Pt sounded positive and said that with getting better sleep she is feeling so much better.     Pt had no concerns at this time.    Plan:  Pt to continue with support groups and sponsor visits.  SW to check chart in one month and call in two.     ROSS Spaulding, Clinic Care Coordinator 3/2/2018   9:09 AM  [phone]

## 2018-03-29 ENCOUNTER — PATIENT OUTREACH (OUTPATIENT)
Dept: CARE COORDINATION | Facility: CLINIC | Age: 48
End: 2018-03-29

## 2018-03-29 NOTE — PROGRESS NOTES
Clinic Care Coordination Contact    Situation: Patient chart reviewed by care coordinator.    Background: pt has previously had been struggling with anxiety and alcohol use    Assessment: on last outreach pt was doing well and had 3 sponsors.  Per chart review there have not been any visits since last outreach.     Plan/Recommendations: Sw will follow up in about one month to reassess for any needs.     ROSS Spaulding, Clinic Care Coordinator 3/29/2018   8:45 AM  [phone]

## 2018-05-03 ENCOUNTER — PATIENT OUTREACH (OUTPATIENT)
Dept: CARE COORDINATION | Facility: CLINIC | Age: 48
End: 2018-05-03

## 2018-05-03 NOTE — PROGRESS NOTES
Clinic Care Coordination Contact  [facility]/Voicemail       Clinical Data: Care Coordinator Outreach  Outreach attempted x 1.  Left message on voicemail with call back information and requested return call.  Plan:  Care Coordinator will try to reach patient again in 4-8 business days.  ROSS Spaulding, Clinic Care Coordinator 5/3/2018   3:55 PM  [phone]

## 2018-05-03 NOTE — LETTER
[place] CARE COORDINATION - [address] [place] [address]  [phone]    May 9, 2018    Karishma Kimball  [address] [address] [address] [facility] [address]      Dear Karishma,    I am a clinic care coordinator who works with Dara Pope PA-C at [facility]. I recently tried to call and was unable to reach you. I wanted to introduce myself and provide you with my contact information so that you can call me with questions or concerns about your health care. Below is a description of clinic care coordination and how I can further assist you.     The clinic care coordinator is a registered nurse and/or  who understand the health care system. The goal of clinic care coordination is to help you manage your health and improve access to the [facility] system in the most efficient manner. The registered nurse can assist you in meeting your health care goals by providing education, coordinating services, and strengthening the communication among your providers. The  can assist you with financial, behavioral, psychosocial, chemical dependency, counseling, and/or psychiatric resources.    Please feel free to contact me at [phone], with any questions or concerns. We at [facility] are focused on providing you with the highest-quality healthcare experience possible and that all starts with you.     Sincerely,     Kallie Mcmanus, [facility]  Clinic Care Coordination  [phone]  [phone]}

## 2018-05-09 NOTE — PROGRESS NOTES
Clinic Care Coordination Contact  [facility]/Voicemail       Clinical Data: Care Coordinator Outreach  Outreach attempted x 2.  Left message on voicemail with call back information and requested return call.  Plan: Care Coordinator will send unable to contact letter. Care Coordinator will do no further outreaches at this time.  ROSS Spaulding, Clinic Care Coordinator 5/9/2018   2:48 PM  [phone]

## 2018-06-18 ENCOUNTER — HEALTH MAINTENANCE LETTER (OUTPATIENT)
Age: 48
End: 2018-06-18

## 2018-07-02 DIAGNOSIS — F51.01 IDIOPATHIC INSOMNIA: ICD-10-CM

## 2018-07-02 RX ORDER — AMITRIPTYLINE HYDROCHLORIDE 50 MG/1
TABLET ORAL
Qty: 90 TABLET | Refills: 3 | Status: SHIPPED | OUTPATIENT
Start: 2018-07-02 | End: 2019-02-15

## 2018-07-02 NOTE — TELEPHONE ENCOUNTER
Chief Complaint   Patient presents with     Refill Request     amitriptyline (ELAVIL) 50 MG tablet      Refill request received via fax by pharmacy      [facility] [place] [address] [place] [address]  Pending Prescriptions:                       Disp   Refills    amitriptyline (ELAVIL) 50 MG tablet       90 tab*3            Sig: Take 3 tablets by mouth at bed time.         Last Written Prescription Date:  05/04/2017  Last Fill Quantity: 90 per 30 days,   # refills: 11  Last Office Visit with prescribing provider: 09/15/17  Future Office visit:    Next 5 appointments (look out 90 days)     Jul 09, 2018  9:40 AM CDT   PHYSICAL with Dara Pope PA-C   [facility]    [address] [place] [id number] [phone]                   Routing refill request to provider for review/approval because:  Drug not on the [facility], [facility] or  Health refill protocol or controlled substance

## 2018-07-09 ENCOUNTER — HOSPITAL ENCOUNTER (OUTPATIENT)
Dept: MAMMOGRAPHY | Facility: CLINIC | Age: 48
Discharge: HOME | End: 2018-07-09
Attending: PHYSICIAN ASSISTANT | Admitting: PHYSICIAN ASSISTANT
Payer: COMMERCIAL

## 2018-07-09 ENCOUNTER — OFFICE VISIT (OUTPATIENT)
Dept: FAMILY MEDICINE | Facility: CLINIC | Age: 48
End: 2018-07-09
Payer: COMMERCIAL

## 2018-07-09 VITALS
HEIGHT: 64 IN | HEART RATE: 102 BPM | SYSTOLIC BLOOD PRESSURE: 127 MMHG | RESPIRATION RATE: 16 BRPM | DIASTOLIC BLOOD PRESSURE: 86 MMHG | WEIGHT: 139 LBS | BODY MASS INDEX: 23.73 KG/M2 | TEMPERATURE: 97.9 F

## 2018-07-09 DIAGNOSIS — F32.5 MAJOR DEPRESSION IN COMPLETE REMISSION (H): ICD-10-CM

## 2018-07-09 DIAGNOSIS — Z12.11 SPECIAL SCREENING FOR MALIGNANT NEOPLASMS, COLON: ICD-10-CM

## 2018-07-09 DIAGNOSIS — R00.0 ELEVATED PULSE RATE: ICD-10-CM

## 2018-07-09 DIAGNOSIS — Z72.0 TOBACCO ABUSE: ICD-10-CM

## 2018-07-09 DIAGNOSIS — Z23 NEED FOR VACCINATION: ICD-10-CM

## 2018-07-09 DIAGNOSIS — F10.90 ALCOHOL USE DISORDER: ICD-10-CM

## 2018-07-09 DIAGNOSIS — Z12.31 VISIT FOR SCREENING MAMMOGRAM: ICD-10-CM

## 2018-07-09 DIAGNOSIS — Z01.419 ENCOUNTER FOR GYNECOLOGICAL EXAMINATION WITHOUT ABNORMAL FINDING: Primary | ICD-10-CM

## 2018-07-09 PROBLEM — F10.11 ALCOHOL USE DISORDER, MILD, IN EARLY REMISSION: Status: ACTIVE | Noted: 2017-02-17

## 2018-07-09 LAB
CHOLEST SERPL-MCNC: 167 MG/DL
GLUCOSE SERPL-MCNC: 85 MG/DL (ref 70–99)
HDLC SERPL-MCNC: 125 MG/DL
LDLC SERPL CALC-MCNC: 37 MG/DL
NONHDLC SERPL-MCNC: 42 MG/DL
TRIGL SERPL-MCNC: 24 MG/DL

## 2018-07-09 PROCEDURE — 82947 ASSAY GLUCOSE BLOOD QUANT: CPT | Performed by: PHYSICIAN ASSISTANT

## 2018-07-09 PROCEDURE — 90472 IMMUNIZATION ADMIN EACH ADD: CPT | Performed by: PHYSICIAN ASSISTANT

## 2018-07-09 PROCEDURE — G0145 SCR C/V CYTO,THINLAYER,RESCR: HCPCS | Performed by: PHYSICIAN ASSISTANT

## 2018-07-09 PROCEDURE — 90471 IMMUNIZATION ADMIN: CPT | Performed by: PHYSICIAN ASSISTANT

## 2018-07-09 PROCEDURE — 90632 HEPA VACCINE ADULT IM: CPT | Performed by: PHYSICIAN ASSISTANT

## 2018-07-09 PROCEDURE — 90714 TD VACC NO PRESV 7 YRS+ IM: CPT | Performed by: PHYSICIAN ASSISTANT

## 2018-07-09 PROCEDURE — 87624 HPV HI-RISK TYP POOLED RSLT: CPT | Performed by: PHYSICIAN ASSISTANT

## 2018-07-09 PROCEDURE — 77067 SCR MAMMO BI INCL CAD: CPT

## 2018-07-09 PROCEDURE — 36415 COLL VENOUS BLD VENIPUNCTURE: CPT | Performed by: PHYSICIAN ASSISTANT

## 2018-07-09 PROCEDURE — G0124 SCREEN C/V THIN LAYER BY MD: HCPCS | Performed by: PHYSICIAN ASSISTANT

## 2018-07-09 PROCEDURE — 80061 LIPID PANEL: CPT | Performed by: PHYSICIAN ASSISTANT

## 2018-07-09 PROCEDURE — 99396 PREV VISIT EST AGE 40-64: CPT | Mod: 25 | Performed by: PHYSICIAN ASSISTANT

## 2018-07-09 ASSESSMENT — ANXIETY QUESTIONNAIRES
GAD7 TOTAL SCORE: 0
6. BECOMING EASILY ANNOYED OR IRRITABLE: NOT AT ALL
4. TROUBLE RELAXING: NOT AT ALL
2. NOT BEING ABLE TO STOP OR CONTROL WORRYING: NOT AT ALL
3. WORRYING TOO MUCH ABOUT DIFFERENT THINGS: NOT AT ALL
1. FEELING NERVOUS, ANXIOUS, OR ON EDGE: NOT AT ALL
7. FEELING AFRAID AS IF SOMETHING AWFUL MIGHT HAPPEN: NOT AT ALL
GAD7 TOTAL SCORE: 0
7. FEELING AFRAID AS IF SOMETHING AWFUL MIGHT HAPPEN: NOT AT ALL
GAD7 TOTAL SCORE: 0
5. BEING SO RESTLESS THAT IT IS HARD TO SIT STILL: NOT AT ALL

## 2018-07-09 ASSESSMENT — PATIENT HEALTH QUESTIONNAIRE - PHQ9
SUM OF ALL RESPONSES TO PHQ QUESTIONS 1-9: 0
10. IF YOU CHECKED OFF ANY PROBLEMS, HOW DIFFICULT HAVE THESE PROBLEMS MADE IT FOR YOU TO DO YOUR WORK, TAKE CARE OF THINGS AT HOME, OR GET ALONG WITH OTHER PEOPLE: NOT DIFFICULT AT ALL
SUM OF ALL RESPONSES TO PHQ QUESTIONS 1-9: 0

## 2018-07-09 NOTE — LETTER
July 21, 2018    Karishma Kimball  [address] [facility] [address] [facility] [address] [facility] [address]    Dear Karishma,  We are happy to inform you that your PAP smear result from 7/9/18 is normal.  We are now able to do a follow up test on PAP smears. The DNA test is for HPV (Human Papilloma Virus). Cervical cancer is closely linked with certain types of HPV. Your results showed no evidence of high risk HPV.  Therefore we recommend you return in 5 years for your next pap smear and HPV test.  You will still need to return to the clinic every year for an annual exam and other preventive tests.  Please contact the clinic at [phone] with any questions.  Sincerely,    Dara Pope PA-C/kranthi

## 2018-07-09 NOTE — NURSING NOTE
Screening Questionnaire for Adult Immunization    Are you sick today?   No   Do you have allergies to medications, food, a vaccine component or latex?   No   Have you ever had a serious reaction after receiving a vaccination?   No   Do you have a long-term health problem with heart disease, lung disease, asthma, kidney disease, metabolic disease (e.g. diabetes), anemia, or other blood disorder?   No   Do you have cancer, leukemia, HIV/AIDS, or any other immune system problem?   No   In the past 3 months, have you taken medications that affect  your immune system, such as prednisone, other steroids, or anticancer drugs; drugs for the treatment of rheumatoid arthritis, Crohn s disease, or psoriasis; or have you had radiation treatments?   No   Have you had a seizure, or a brain or other nervous system problem?   No   During the past year, have you received a transfusion of blood or blood     products, or been given immune (gamma) globulin or antiviral drug?   No   For women: Are you pregnant or is there a chance you could become        pregnant during the next month?   No   Have you received any vaccinations in the past 4 weeks?   No     Immunization questionnaire answers were all negative.        Per orders of Dara Foster, injection of Hep A and TD given by Brittni Hogue. Patient instructed to remain in clinic for 15 minutes afterwards, and to report any adverse reaction to me immediately.       Screening performed by Brittni Hogue on 7/9/2018 at 12:17 PM.

## 2018-07-09 NOTE — NURSING NOTE
"Chief Complaint   Patient presents with     Physical       Initial /86 (BP Location: Right arm, Patient Position: Chair, Cuff Size: Adult Regular)  Pulse 102  Temp 97.9  F (36.6  C) (Tympanic)  Resp 16  Ht 5' 4\" (1.626 m)  Wt 139 lb (63 kg)  BMI 23.86 kg/m2 Estimated body mass index is 23.86 kg/(m^2) as calculated from the following:    Height as of this encounter: 5' 4\" (1.626 m).    Weight as of this encounter: 139 lb (63 kg).      Health Maintenance that is potentially due pending provider review:  NONE        Is there anyone who you would like to be able to receive your results? No  If yes have patient fill out MACEY      "

## 2018-07-09 NOTE — MR AVS SNAPSHOT
After Visit Summary   7/9/2018    Karishma Kimball    MRN: [id number]           Patient Information     Date Of Birth          1970        Visit Information        Provider Department      7/9/2018 9:40 AM Dara Pope PA-C [facility]        Today's Diagnoses     Encounter for gynecological examination without abnormal finding    -  1      Care Instructions    Pap and labs today     Done with MMR vaccine per pregnancy rubella need     Calcium - discussed 1000 mg per day split over 2 or more doses, and not at same time as magnesium    Dentist sometime     Tetanus and starting hep A vaccine  Next hep A in 6 months or more    Monitor pulse occasionally - normal is under 100    Preventive Health Recommendations  Female Ages 40 to 49    Yearly exam:     See your health care provider every year in order to  1. Review health changes.   2. Discuss preventive care.    3. Review your medicines if your doctor prescribed any.      Get a Pap test every three years (unless you have an abnormal result and your provider advises testing more often).      If you get Pap tests with HPV test, you only need to test every 5 years, unless you have an abnormal result. You do not need a Pap test if your uterus was removed (hysterectomy) and you have not had cancer.      You should be tested each year for STDs (sexually transmitted diseases), if you're at risk.     Ask your doctor if you should have a mammogram.      Have a colonoscopy (test for colon cancer) if someone in your family has had colon cancer or polyps before age 50.       Have a cholesterol test every 5 years.       Have a diabetes test (fasting glucose) after age 45. If you are at risk for diabetes, you should have this test every 3 years.    Shots: Get a flu shot each year. Get a tetanus shot every 10 years.     Nutrition:     Eat at least 5 servings of fruits and vegetables each day.    Eat whole-grain bread, whole-wheat pasta  "and brown rice instead of white grains and rice.    Get adequate Calcium and Vitamin D.      Lifestyle    Exercise at least 150 minutes a week (an average of 30 minutes a day, 5 days a week). This will help you control your weight and prevent disease.    Limit alcohol to one drink per day.    No smoking.     Wear sunscreen to prevent skin cancer.    See your dentist every six months for an exam and cleaning.          Follow-ups after your visit        Your next 10 appointments already scheduled     Jul 09, 2018  3:15 PM CDT   MA SCREENING DIGITAL BILATERAL with WYMA2   [facility] ([facility])    [address] [facility] [address]   [phone]           Do not use any powder, lotion or deodorant under your arms or on your breast. If you do, we will ask you to remove it before your exam.  Wear comfortable, two-piece clothing.  If you have any allergies, tell your care team.  Bring any previous mammograms from other facilities or have them mailed to the breast center. Three-dimensional (3D) mammograms are available at [place] locations in [facility], [facility], [facility], and [place]. [facility] locations include [place] and [facility] [place] in [place]. Benefits of 3D mammograms include: - Improved rate of cancer detection - Decreases your chance of having to go back for more tests, which means fewer: - \"False-positive\" results (This means that there is an abnormal area but it isn't cancer.) - Invasive testing procedures, such as a biopsy or surgery - Can provide clearer images of the breast if you have dense breast tissue. 3D mammography is an optional exam that anyone can have with a 2D mammogram. It doesn't replace or take the place of a 2D mammogram. 2D mammograms remain an effective screening test for all women.  Not all insurance companies cover the cost of a 3D mammogram. Check with your insurance.            Jul 23, " "2018  4:00 PM CDT   Return Sleep Patient with Prakash Blake MD   [facility] ([place] [facility])    [address]  [facility] [address] [phone]              Who to contact     If you have questions or need follow up information about today's clinic visit or your schedule please contact [facility] directly at [phone].  Normal or non-critical lab and imaging results will be communicated to you by MyChart, letter or phone within 4 business days after the clinic has received the results. If you do not hear from us within 7 days, please contact the clinic through MyChart or phone. If you have a critical or abnormal lab result, we will notify you by phone as soon as possible.  Submit refill requests through Buzzero or call your pharmacy and they will forward the refill request to us. Please allow 3 business days for your refill to be completed.          Additional Information About Your Visit        Care EveryWhere ID     This is your Care EveryWhere ID. This could be used by other organizations to access your [place] medical records  [id number]        Your Vitals Were     Pulse Temperature Respirations Height BMI (Body Mass Index)       102 97.9  F (36.6  C) (Tympanic) 16 5' 4\" (1.626 m) 23.86 kg/m2        Blood Pressure from Last 3 Encounters:   07/09/18 127/86   12/07/17 136/88   08/17/17 130/88    Weight from Last 3 Encounters:   07/09/18 139 lb (63 kg)   12/07/17 137 lb 3.2 oz (62.2 kg)   09/15/17 136 lb (61.7 kg)              We Performed the Following     Glucose     HPV High Risk Types DNA Cervical     Lipid panel reflex to direct LDL Fasting     Pap imaged thin layer screen with HPV - recommended age 30 - 65 years (select HPV order below)        Primary Care Provider Office Phone # Fax #    Dara Pope PA-C [phone] [address] [place] [address]        Equal Access to Services     FIIRO " GAAR : Hadii aad ku john Velez, waaxda luqadaha, qaybta kamandoda chantal, waxrivera homer haymahi phamherbersarika brasher . So [place] [phone].    ATENCIÓN: Si habla español, tiene a melton disposición servicios gratuitos de asistencia lingüística. Llame al [phone].    We comply with applicable federal civil rights laws and [place] laws. We do not discriminate on the basis of race, color, national origin, age, disability, sex, sexual orientation, or gender identity.            Thank you!     Thank you for choosing [facility]  for your care. Our goal is always to provide you with excellent care. Hearing back from our patients is one way we can continue to improve our services. Please take a few minutes to complete the written survey that you may receive in the mail after your visit with us. Thank you!             Your Updated Medication List - Protect others around you: Learn how to safely use, store and throw away your medicines at www.disposemymeds.org.          This list is accurate as of 7/9/18 10:24 AM.  Always use your most recent med list.                   Brand Name Dispense Instructions for use Diagnosis    ** PATIENT ALERT **      MAXIMUM Ibuprofen FROM ALL SOURCES SHOULD NOT EXCEED 4000MG IN 24 HOURS        amitriptyline 50 MG tablet    ELAVIL    90 tablet    Take 3 tablets by mouth at bed time.    Idiopathic insomnia       levonorgestrel 20 MCG/24HR IUD    MIRENA    1 each    1 each (20 mcg) by Intrauterine route once for 1 dose    Encounter for IUD removal and reinsertion       temazepam 15 MG capsule    RESTORIL    60 capsule    Take 1-2 capsules by mouth 15-30 minutes before bed.  Start with 1 capsule, ok to increase to 2 capsules if residual insomnia after 3-4 days.    Persistent insomnia, Major depressive disorder, recurrent episode, mild (H), Generalized anxiety disorder

## 2018-07-09 NOTE — PATIENT INSTRUCTIONS
Pap and labs today     Done with MMR vaccine per pregnancy rubella need     Calcium - discussed 1000 mg per day split over 2 or more doses, and not at same time as magnesium    Dentist sometime     Tetanus and starting hep A vaccine  Next hep A in 6 months or more    Monitor pulse occasionally - normal is under 100    Preventive Health Recommendations  Female Ages 40 to 49    Yearly exam:     See your health care provider every year in order to  1. Review health changes.   2. Discuss preventive care.    3. Review your medicines if your doctor prescribed any.      Get a Pap test every three years (unless you have an abnormal result and your provider advises testing more often).      If you get Pap tests with HPV test, you only need to test every 5 years, unless you have an abnormal result. You do not need a Pap test if your uterus was removed (hysterectomy) and you have not had cancer.      You should be tested each year for STDs (sexually transmitted diseases), if you're at risk.     Ask your doctor if you should have a mammogram.      Have a colonoscopy (test for colon cancer) if someone in your family has had colon cancer or polyps before age 50.       Have a cholesterol test every 5 years.       Have a diabetes test (fasting glucose) after age 45. If you are at risk for diabetes, you should have this test every 3 years.    Shots: Get a flu shot each year. Get a tetanus shot every 10 years.     Nutrition:     Eat at least 5 servings of fruits and vegetables each day.    Eat whole-grain bread, whole-wheat pasta and brown rice instead of white grains and rice.    Get adequate Calcium and Vitamin D.      Lifestyle    Exercise at least 150 minutes a week (an average of 30 minutes a day, 5 days a week). This will help you control your weight and prevent disease.    Limit alcohol to one drink per day.    No smoking.     Wear sunscreen to prevent skin cancer.    See your dentist every six months for an exam and  cleaning.

## 2018-07-10 ASSESSMENT — ANXIETY QUESTIONNAIRES: GAD7 TOTAL SCORE: 0

## 2018-07-10 ASSESSMENT — PATIENT HEALTH QUESTIONNAIRE - PHQ9: SUM OF ALL RESPONSES TO PHQ QUESTIONS 1-9: 0

## 2018-07-13 LAB
COPATH REPORT: NORMAL
PAP: NORMAL

## 2018-07-17 LAB
FINAL DIAGNOSIS: NORMAL
HPV HR 12 DNA CVX QL NAA+PROBE: NEGATIVE
HPV16 DNA SPEC QL NAA+PROBE: NEGATIVE
HPV18 DNA SPEC QL NAA+PROBE: NEGATIVE
SPECIMEN DESCRIPTION: NORMAL
SPECIMEN SOURCE CVX/VAG CYTO: NORMAL

## 2018-07-20 ENCOUNTER — TELEPHONE (OUTPATIENT)
Dept: FAMILY MEDICINE | Facility: CLINIC | Age: 48
End: 2018-07-20

## 2018-07-20 DIAGNOSIS — H91.90 HEARING LOSS, UNSPECIFIED HEARING LOSS TYPE, UNSPECIFIED LATERALITY: Primary | ICD-10-CM

## 2018-07-20 NOTE — TELEPHONE ENCOUNTER
Pt is requesting a referral for Hearing.  Pt is [facility] Rt ear is plugged. Pt was in last week and forgot to ask for a referral.  [place] [facility]

## 2018-07-23 ENCOUNTER — OFFICE VISIT (OUTPATIENT)
Dept: SLEEP MEDICINE | Facility: CLINIC | Age: 48
End: 2018-07-23
Payer: COMMERCIAL

## 2018-07-23 VITALS
HEART RATE: 100 BPM | HEIGHT: 65 IN | WEIGHT: 140.6 LBS | SYSTOLIC BLOOD PRESSURE: 150 MMHG | BODY MASS INDEX: 23.43 KG/M2 | DIASTOLIC BLOOD PRESSURE: 98 MMHG | OXYGEN SATURATION: 97 %

## 2018-07-23 DIAGNOSIS — G47.00 INSOMNIA, UNSPECIFIED TYPE: Primary | ICD-10-CM

## 2018-07-23 DIAGNOSIS — F41.1 GENERALIZED ANXIETY DISORDER: Chronic | ICD-10-CM

## 2018-07-23 PROCEDURE — 99214 OFFICE O/P EST MOD 30 MIN: CPT | Performed by: FAMILY MEDICINE

## 2018-07-23 NOTE — PATIENT INSTRUCTIONS
"Here is a summary of our plan.    We discussed three \"recipes\" to adjust medications.    1.)  Today, we are going to move the amitriptyline to either morning or mid-day, and ok to continue the Temazepam 1-2 capsules 30 minutes before bed.    2.)  We can also consider changing the night-time medicine to Clonazepam, which also can be helpful for reducing sleep walking.    3.)  Please give me an update in a few weeks.  "

## 2018-07-23 NOTE — MR AVS SNAPSHOT
"              After Visit Summary   7/23/2018    Karishma Kimball    MRN: [id number]           Patient Information     Date Of Birth          1970        Visit Information        Provider Department      7/23/2018 4:00 PM Prakash Blake MD [facility] Instructions    Here is a summary of our plan.    We discussed three \"recipes\" to adjust medications.    1.)  Today, we are going to move the amitriptyline to either morning or mid-day, and ok to continue the Temazepam 1-2 capsules 30 minutes before bed.    2.)  We can also consider changing the night-time medicine to Clonazepam, which also can be helpful for reducing sleep walking.    3.)  Please give me an update in a few weeks.          Follow-ups after your visit        Your next 10 appointments already scheduled     Jul 27, 2018  9:00 AM CDT   MA DIAGNOSTIC RIGHT ROBEL with WYMA1   [facility] ([facility])    [address] [facility] [address] [phone]           Do not use any powder, lotion or deodorant under your arms or on your breast. If you do, we will ask you to remove it before your exam.  Wear comfortable, two-piece clothing.  If you have any allergies, tell your care team.  Bring any previous mammograms from other facilities or have them mailed to the breast center.  Three-dimensional (3D) mammograms are available at [place] locations in [facility], [facility], [place], [place], and [place]. -[facility] locations include [place] and [facility] in [place]. Benefits of 3D mammograms include: - Improved rate of cancer detection - Decreases your chance of having to go back for more tests, which means fewer: - \"False-positive\" results (This means that there is an abnormal area but it isn't cancer.) - Invasive testing procedures, such as a biopsy or surgery - Can provide clearer images of the breast if you have dense breast " tissue. 3D mammography is an optional exam that anyone can have with a 2D mammogram. It doesn't replace or take the place of a 2D mammogram. 2D mammograms remain an effective screening test for all women.  Not all insurance companies cover the cost of a 3D mammogram. Check with your insurance.            Jul 27, 2018  9:30 AM CDT   US BREAST RIGHT LIMITED 1-3 QUAD with WYUS2   [facility] ([facility])    [address] [facility] [address]   [phone]           Please bring a list of your medicines (including vitamins, minerals and over-the-counter drugs). Also, tell your doctor about any allergies you may have. Wear comfortable clothes and leave your valuables at home.  You do not need to do anything special to prepare for your exam.  Please call the Imaging Department at your exam site with any questions.            Sep 17, 2018   Procedure with oRscoe Warren MD   [facility] ([facility])    [address] [facility] [address]   [phone]           The medical center is located at [address]. (between [address] in [place], [place] miles north of [place]).              Who to contact     If you have questions or need follow up information about today's clinic visit or your schedule please contact [facility] directly at [phone].  Normal or non-critical lab and imaging results will be communicated to you by MyChart, letter or phone within 4 business days after the clinic has received the results. If you do not hear from us within 7 days, please contact the clinic through MyChart or phone. If you have a critical or abnormal lab result, we will notify you by phone as soon as possible.  Submit refill requests through EverCloud or call your pharmacy and they will forward the refill request to us. Please allow 3 business days for your refill to be completed.          Additional Information About Your  "Visit        Care EveryWhere ID     This is your Care EveryWhere ID. This could be used by other organizations to access your [place] medical records  [id number]        Your Vitals Were     Pulse Height Pulse Oximetry BMI (Body Mass Index)          100 1.638 m (5' 4.5\") 97% 23.76 kg/m2         Blood Pressure from Last 3 Encounters:   07/23/18 (!) 150/98   07/09/18 127/86   12/07/17 136/88    Weight from Last 3 Encounters:   07/23/18 63.8 kg (140 lb 9.6 oz)   07/09/18 63 kg (139 lb)   12/07/17 62.2 kg (137 lb 3.2 oz)              Today, you had the following     No orders found for display       Primary Care Provider Office Phone # Fax #    Dara Pope PA-C [phone] [address] [place], [place] [address]        Equal Access to Services     KWADWO COULTER : Hadii aad ku hadasho Sostefano, waaxda luqadaha, qaybta kaalmada adeegyada, bess brasher . So [facility] [phone].    ATENCIÓN: Si teodoro luque, tiene a melton disposición servicios gratuitos de asistencia lingüística. Llame al [phone].    We comply with applicable federal civil rights laws and [place] laws. We do not discriminate on the basis of race, color, national origin, age, disability, sex, sexual orientation, or gender identity.            Thank you!     Thank you for choosing [facility]  for your care. Our goal is always to provide you with excellent care. Hearing back from our patients is one way we can continue to improve our services. Please take a few minutes to complete the written survey that you may receive in the mail after your visit with us. Thank you!             Your Updated Medication List - Protect others around you: Learn how to safely use, store and throw away your medicines at www.disposemymeds.org.          This list is accurate as of 7/23/18  4:26 PM.  Always use your most recent med list.                   Brand Name Dispense Instructions for use Diagnosis "    ** PATIENT ALERT **      MAXIMUM Ibuprofen FROM ALL SOURCES SHOULD NOT EXCEED 4000MG IN 24 HOURS        amitriptyline 50 MG tablet    ELAVIL    90 tablet    Take 3 tablets by mouth at bed time.    Idiopathic insomnia       levonorgestrel 20 MCG/24HR IUD    MIRENA    1 each    1 each (20 mcg) by Intrauterine route once for 1 dose    Encounter for IUD removal and reinsertion       temazepam 15 MG capsule    RESTORIL    60 capsule    Take 1-2 capsules by mouth 15-30 minutes before bed.  Start with 1 capsule, ok to increase to 2 capsules if residual insomnia after 3-4 days.    Persistent insomnia, Major depressive disorder, recurrent episode, mild (H), Generalized anxiety disorder
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
## 2018-07-27 ENCOUNTER — HOSPITAL ENCOUNTER (OUTPATIENT)
Dept: ULTRASOUND IMAGING | Facility: CLINIC | Age: 48
End: 2018-07-27
Attending: PHYSICIAN ASSISTANT
Payer: COMMERCIAL

## 2018-07-27 ENCOUNTER — HOSPITAL ENCOUNTER (OUTPATIENT)
Dept: MAMMOGRAPHY | Facility: CLINIC | Age: 48
Discharge: HOME | End: 2018-07-27
Attending: PHYSICIAN ASSISTANT | Admitting: PHYSICIAN ASSISTANT
Payer: COMMERCIAL

## 2018-07-27 DIAGNOSIS — R92.8 ABNORMAL MAMMOGRAM: ICD-10-CM

## 2018-07-27 PROCEDURE — 76642 ULTRASOUND BREAST LIMITED: CPT | Mod: RT

## 2018-07-27 PROCEDURE — G0279 TOMOSYNTHESIS, MAMMO: HCPCS

## 2018-08-06 DIAGNOSIS — F41.1 GENERALIZED ANXIETY DISORDER: Chronic | ICD-10-CM

## 2018-08-06 DIAGNOSIS — G47.00 PERSISTENT INSOMNIA: Chronic | ICD-10-CM

## 2018-08-06 DIAGNOSIS — F33.0 MAJOR DEPRESSIVE DISORDER, RECURRENT EPISODE, MILD (H): Chronic | ICD-10-CM

## 2018-08-06 NOTE — TELEPHONE ENCOUNTER
Requested Prescriptions   Pending Prescriptions Disp Refills     temazepam (RESTORIL) 15 MG capsule 60 capsule 5     Sig: Take 1-2 capsules by mouth 15-30 minutes before bed.  Start with 1 capsule, ok to increase to 2 capsules if residual insomnia after 3-4 days.    There is no refill protocol information for this order        temazepam (RESTORIL) 15 MG capsule  Last Written Prescription Date:  02/01/2018  Last Fill Quantity: 60 capsule,  # refills: 5   Last office visit: 7/9/2018 with prescribing provider:  ALIYAH Pope   Future Office Visit:      Kimberley EVANS (GUSTAVO) (M)

## 2018-08-08 RX ORDER — TEMAZEPAM 15 MG/1
CAPSULE ORAL
Qty: 60 CAPSULE | Refills: 5 | Status: SHIPPED | OUTPATIENT
Start: 2018-08-08 | End: 2019-01-03

## 2018-09-13 ENCOUNTER — NURSE TRIAGE (OUTPATIENT)
Dept: NURSING | Facility: CLINIC | Age: 48
End: 2018-09-13

## 2018-09-13 ENCOUNTER — ANESTHESIA EVENT (OUTPATIENT)
Dept: GASTROENTEROLOGY | Facility: CLINIC | Age: 48
End: 2018-09-13
Payer: COMMERCIAL

## 2018-09-13 ASSESSMENT — LIFESTYLE VARIABLES: TOBACCO_USE: 1

## 2018-09-13 NOTE — ANESTHESIA PREPROCEDURE EVALUATION
Anesthesia Evaluation     . Pt has had prior anesthetic. Type: General and MAC    No history of anesthetic complications          ROS/MED HX    ENT/Pulmonary:     (+)tobacco use, Current use , . .    Neurologic: Comment: Brachial neuritis or radiculitis      Cardiovascular:     (+) hypertension----. : . . . :. .       METS/Exercise Tolerance:  >4 METS   Hematologic: Comments: thrombocytopenia        Musculoskeletal: Comment: FUSION CERVICAL ANTERIOR ONE LEVEL  (+) arthritis, , , -       GI/Hepatic:  - neg GI/hepatic ROS       Renal/Genitourinary: Comment: hematuria        Endo:  - neg endo ROS       Psychiatric: Comment: Persistent insomnia  Dissociative disorder  PTSD  Suicidal ideation    (+) psychiatric history anxiety and depression      Infectious Disease:  - neg infectious disease ROS       Malignancy:      - no malignancy   Other: Comment: Abnormal Pap smear  Substance abuse   Alcohol use disorder                         Physical Exam  Normal systems: cardiovascular, pulmonary and dental    Airway   Mallampati: II  TM distance: >3 FB  Neck ROM: full    Dental     Cardiovascular   Rhythm and rate: regular and normal      Pulmonary    breath sounds clear to auscultation                    Anesthesia Plan      History & Physical Review  History and physical reviewed and following examination; no interval change.    ASA Status:  2 .    NPO Status:  > 6 hours    Plan for General and MAC with Propofol induction. Maintenance will be TIVA.           Postoperative Care      Consents  Anesthetic plan, risks, benefits and alternatives discussed with:  Patient..                          .

## 2018-09-13 NOTE — TELEPHONE ENCOUNTER
Patient requested review of colonoscopy prep.  FNA reviewed and answered patient's questions.    Additional Information    Negative: RN needs further essential information from caller in order to complete triage    Negative: Requesting regular office appointment    Negative: [1] Caller requesting NON-URGENT health information AND [2] PCP's office is the best resource    Negative: [1] Caller is not with the adult (patient) AND [2] reporting urgent symptoms    Negative: Lab result questions    Negative: Medication questions    Negative: Caller cannot be reached by phone    Negative: Caller has already spoken to PCP or another triager    Health Information question, no triage required and triager able to answer question    Protocols used: IN[facility] ONLY [facility]

## 2018-09-17 ENCOUNTER — ANESTHESIA (OUTPATIENT)
Dept: GASTROENTEROLOGY | Facility: CLINIC | Age: 48
End: 2018-09-17
Payer: COMMERCIAL

## 2018-09-17 ENCOUNTER — HOSPITAL ENCOUNTER (OUTPATIENT)
Facility: CLINIC | Age: 48
Discharge: HOME | End: 2018-09-17
Attending: SURGERY | Admitting: SURGERY
Payer: COMMERCIAL

## 2018-09-17 ENCOUNTER — SURGERY (OUTPATIENT)
Age: 48
End: 2018-09-17

## 2018-09-17 VITALS
DIASTOLIC BLOOD PRESSURE: 98 MMHG | BODY MASS INDEX: 23.32 KG/M2 | OXYGEN SATURATION: 96 % | HEIGHT: 65 IN | RESPIRATION RATE: 16 BRPM | WEIGHT: 140 LBS | TEMPERATURE: 98.4 F | SYSTOLIC BLOOD PRESSURE: 134 MMHG

## 2018-09-17 LAB
COLONOSCOPY: NORMAL
HCG UR QL: NEGATIVE

## 2018-09-17 PROCEDURE — 25000128 H RX IP 250 OP 636: Performed by: NURSE ANESTHETIST, CERTIFIED REGISTERED

## 2018-09-17 PROCEDURE — G0121 COLON CA SCRN NOT HI RSK IND: HCPCS | Performed by: SURGERY

## 2018-09-17 PROCEDURE — 81025 URINE PREGNANCY TEST: CPT | Performed by: NURSE ANESTHETIST, CERTIFIED REGISTERED

## 2018-09-17 PROCEDURE — 25000128 H RX IP 250 OP 636: Performed by: SURGERY

## 2018-09-17 PROCEDURE — 25000125 ZZHC RX 250: Performed by: SURGERY

## 2018-09-17 PROCEDURE — 37000008 ZZH ANESTHESIA TECHNICAL FEE, 1ST 30 MIN: Performed by: SURGERY

## 2018-09-17 PROCEDURE — 45378 DIAGNOSTIC COLONOSCOPY: CPT | Performed by: SURGERY

## 2018-09-17 PROCEDURE — 25000125 ZZHC RX 250: Performed by: NURSE ANESTHETIST, CERTIFIED REGISTERED

## 2018-09-17 RX ORDER — PROPOFOL 10 MG/ML
INJECTION, EMULSION INTRAVENOUS CONTINUOUS PRN
Status: DISCONTINUED | OUTPATIENT
Start: 2018-09-17 | End: 2018-09-17

## 2018-09-17 RX ORDER — ONDANSETRON 2 MG/ML
4 INJECTION INTRAMUSCULAR; INTRAVENOUS
Status: COMPLETED | OUTPATIENT
Start: 2018-09-17 | End: 2018-09-17

## 2018-09-17 RX ORDER — SODIUM CHLORIDE, SODIUM LACTATE, POTASSIUM CHLORIDE, CALCIUM CHLORIDE 600; 310; 30; 20 MG/100ML; MG/100ML; MG/100ML; MG/100ML
INJECTION, SOLUTION INTRAVENOUS CONTINUOUS
Status: DISCONTINUED | OUTPATIENT
Start: 2018-09-17 | End: 2018-09-17 | Stop reason: HOSPADM

## 2018-09-17 RX ORDER — PROPOFOL 10 MG/ML
INJECTION, EMULSION INTRAVENOUS PRN
Status: DISCONTINUED | OUTPATIENT
Start: 2018-09-17 | End: 2018-09-17

## 2018-09-17 RX ORDER — LIDOCAINE 40 MG/G
CREAM TOPICAL
Status: DISCONTINUED | OUTPATIENT
Start: 2018-09-17 | End: 2018-09-17 | Stop reason: HOSPADM

## 2018-09-17 RX ADMIN — LIDOCAINE HYDROCHLORIDE 5 ML: 10 INJECTION, SOLUTION EPIDURAL; INFILTRATION; INTRACAUDAL; PERINEURAL at 08:15

## 2018-09-17 RX ADMIN — SODIUM CHLORIDE, POTASSIUM CHLORIDE, SODIUM LACTATE AND CALCIUM CHLORIDE: 600; 310; 30; 20 INJECTION, SOLUTION INTRAVENOUS at 08:15

## 2018-09-17 RX ADMIN — LIDOCAINE HYDROCHLORIDE 40 MG: 10 INJECTION, SOLUTION EPIDURAL; INFILTRATION; INTRACAUDAL; PERINEURAL at 08:51

## 2018-09-17 RX ADMIN — SODIUM CHLORIDE, POTASSIUM CHLORIDE, SODIUM LACTATE AND CALCIUM CHLORIDE: 600; 310; 30; 20 INJECTION, SOLUTION INTRAVENOUS at 08:47

## 2018-09-17 RX ADMIN — PROPOFOL 200 MCG/KG/MIN: 10 INJECTION, EMULSION INTRAVENOUS at 08:51

## 2018-09-17 RX ADMIN — PROPOFOL 100 MG: 10 INJECTION, EMULSION INTRAVENOUS at 08:51

## 2018-09-17 RX ADMIN — ONDANSETRON 4 MG: 2 INJECTION INTRAMUSCULAR; INTRAVENOUS at 08:19

## 2018-09-17 NOTE — H&P
"48 year old year old female here for colonoscopy for screening.    Patient Active Problem List   Diagnosis     Generalized anxiety disorder     IUD (intrauterine device) in place     Mild major depression (H)     Brachial neuritis or radiculitis     Dissociative disorder     History of loop electrical excision procedure (LEEP)     Hypertension     Persistent insomnia     Substance abuse     Alcohol use disorder (H)     Tobacco abuse       Past Medical History:   Diagnosis Date     Abnormal Pap smear 2000     s/p LEEP - annual paps for 20 years     Arthritis     cervical neck     CARDIOVASCULAR SCREENING; LDL GOAL LESS THAN 160      IUD (intrauterine device) in place 5/4/2012    mirena       Past Surgical History:   Procedure Laterality Date     FUSION CERVICAL ANTERIOR ONE LEVEL  9/16/2013    Procedure: FUSION CERVICAL ANTERIOR ONE LEVEL;  ANTERIOR CERVICAL DECOMPRESSION AND FUSION C5-6 WITH INSTRUMENTATION, ALLOGRAFT AND BONE MARROW ASPIRATION OF THE LEFT ILIAC CREST ;  Surgeon: Randy Jameson MD;  Location: [facility] OR     GRAFT BONE FROM ILIAC CREST  9/16/2013    Procedure: GRAFT BONE FROM ILIAC CREST;;  Surgeon: Randy Jameson MD;  Location: [facility] OR     HC COLP CERVIX/UPPER VAGINA W LOOP ELEC BX CERVIX       SURGICAL HISTORY OF -       Colposcopy with loop electrode excision of the cervix       [facility]    No current outpatient prescriptions on file.       No Known Allergies    Pt reports that she has been smoking Cigarettes.  She has been smoking about 0.50 packs per day. She has never used smokeless tobacco. She reports that she does not drink alcohol or use illicit drugs.    Exam:  BP (!) (P) 145/109  Temp (P) 98.4  F (36.9  C) (Oral)  Resp (P) 18  Ht (P) 1.638 m (5' 4.5\")  Wt (P) 63.5 kg (140 lb)  SpO2 (P) 100%  BMI (P) 23.66 kg/m2    Awake, Alert OX3  Lungs - CTA bilaterally  CV - RRR, no murmurs, distal pulses intact  Abd - soft, non-distended, non-tender, +BS  Extr - No cyanosis or " edema    A/P 48 year old year old female in need of colonoscopy for screening. Risks, benefits, alternatives, and complications were discussed including the possibility of perforation and the patient agreed to proceed    Roscoe Warren MD

## 2018-09-17 NOTE — ANESTHESIA CARE TRANSFER NOTE
Patient: Karishma Kimball    Procedure(s):  colonoscopy - Wound Class: II-Clean Contaminated    Diagnosis: screening  Diagnosis Additional Information: No value filed.    Anesthesia Type:   General, MAC     Note:  Airway :Nasal Cannula  Patient transferred to:Phase II  Handoff Report: Identifed the Patient, Identified the Reponsible Provider, Reviewed the pertinent medical history, Discussed the surgical course, Reviewed Intra-OP anesthesia mangement and issues during anesthesia, Set expectations for post-procedure period and Allowed opportunity for questions and acknowledgement of understanding      Vitals: (Last set prior to Anesthesia Care Transfer)    CRNA VITALS  9/17/2018 0833 - 9/17/2018 0903      9/17/2018             Pulse: 84    Ht Rate: 86    SpO2: 98 %                Electronically Signed By: Parag Valenzuela CRNA, APRN CRNA  September 17, 2018  9:03 AM

## 2018-09-17 NOTE — ANESTHESIA POSTPROCEDURE EVALUATION
Patient: Karishma Kimball    Procedure(s):  colonoscopy - Wound Class: II-Clean Contaminated    Diagnosis:screening  Diagnosis Additional Information: No value filed.    Anesthesia Type:  General, MAC    Note:  Anesthesia Post Evaluation    Patient location during evaluation: Phase 2 and Bedside  Patient participation: Able to fully participate in evaluation  Level of consciousness: awake and alert  Pain management: adequate  Airway patency: patent  Cardiovascular status: acceptable  Respiratory status: acceptable  Hydration status: acceptable  PONV: none     Anesthetic complications: None          Last vitals:  Vitals:    09/17/18 0822 09/17/18 0902 09/17/18 0915   BP: (!) 146/114 126/89 126/89   Resp:  14 16   Temp:      SpO2:  95% 96%         Electronically Signed By: Parag Valenzuela CRNA, APRN CRNA  September 17, 2018  9:22 AM

## 2018-09-21 ENCOUNTER — TELEPHONE (OUTPATIENT)
Dept: SLEEP MEDICINE | Facility: CLINIC | Age: 48
End: 2018-09-21

## 2018-09-21 NOTE — TELEPHONE ENCOUNTER
Incoming voicemail voicemail received by Karishma.   She would like a call back to give an update on the medication.     Attempted to contact Karishma. Left message on answering machine for Karishma to return call.

## 2019-01-02 DIAGNOSIS — G47.00 PERSISTENT INSOMNIA: Chronic | ICD-10-CM

## 2019-01-02 DIAGNOSIS — F33.0 MAJOR DEPRESSIVE DISORDER, RECURRENT EPISODE, MILD (H): Chronic | ICD-10-CM

## 2019-01-02 DIAGNOSIS — F41.1 GENERALIZED ANXIETY DISORDER: Chronic | ICD-10-CM

## 2019-01-02 NOTE — TELEPHONE ENCOUNTER
Chief Complaint   Patient presents with     Refill Request     temazepam (RESTORIL) 15 MG capsule      Refill request received via phone by patient or caregiver      [facility], [place] - [address] [place] [address]      Pending Prescriptions:                       Disp   Refills    temazepam (RESTORIL) 15 MG capsule        60 cap*5            Sig: Take 1-2 capsules by mouth 15-30 minutes before           bed.  Start with 1 capsule, ok to increase to 2           capsules if residual insomnia after 3-4 days.         Last Written Prescription Date:  08/08/2018  Last Fill Quantity: 60 per 30 days,   # refills: 5  Last Office Visit with prescribing provider: 07/23/2018  Future Office visit:       Routing refill request to provider for review/approval because:  Drug not on the G, P or [facility] refill protocol or controlled substance

## 2019-01-03 RX ORDER — TEMAZEPAM 15 MG/1
CAPSULE ORAL
Qty: 60 CAPSULE | Refills: 5 | Status: SHIPPED | OUTPATIENT
Start: 2019-01-03 | End: 2019-07-25

## 2019-02-11 ENCOUNTER — APPOINTMENT (OUTPATIENT)
Dept: CT IMAGING | Facility: CLINIC | Age: 49
End: 2019-02-11
Attending: FAMILY MEDICINE
Payer: COMMERCIAL

## 2019-02-11 ENCOUNTER — HOSPITAL ENCOUNTER (EMERGENCY)
Facility: CLINIC | Age: 49
Discharge: HOME | End: 2019-02-11
Attending: EMERGENCY MEDICINE | Admitting: EMERGENCY MEDICINE
Payer: COMMERCIAL

## 2019-02-11 VITALS
OXYGEN SATURATION: 100 % | HEART RATE: 102 BPM | RESPIRATION RATE: 16 BRPM | SYSTOLIC BLOOD PRESSURE: 139 MMHG | DIASTOLIC BLOOD PRESSURE: 105 MMHG | TEMPERATURE: 99.5 F

## 2019-02-11 DIAGNOSIS — M62.838 NECK MUSCLE SPASM: ICD-10-CM

## 2019-02-11 DIAGNOSIS — S09.90XA CLOSED HEAD INJURY, INITIAL ENCOUNTER: ICD-10-CM

## 2019-02-11 PROCEDURE — 99284 EMERGENCY DEPT VISIT MOD MDM: CPT | Mod: 25

## 2019-02-11 PROCEDURE — 70450 CT HEAD/BRAIN W/O DYE: CPT

## 2019-02-11 PROCEDURE — 72125 CT NECK SPINE W/O DYE: CPT

## 2019-02-11 PROCEDURE — 99284 EMERGENCY DEPT VISIT MOD MDM: CPT | Mod: Z6 | Performed by: FAMILY MEDICINE

## 2019-02-11 ASSESSMENT — ENCOUNTER SYMPTOMS
SINUS PRESSURE: 0
VOMITING: 0
WHEEZING: 0
COUGH: 0
DIAPHORESIS: 0
NAUSEA: 0
HEADACHES: 1
FREQUENCY: 0
SORE THROAT: 0
NECK PAIN: 1
CONSTIPATION: 0
CHILLS: 0
TREMORS: 1
SHORTNESS OF BREATH: 0
PALPITATIONS: 0
ABDOMINAL PAIN: 0
DYSURIA: 0
BLOOD IN STOOL: 0
DIARRHEA: 0
FEVER: 0

## 2019-02-11 NOTE — ED NOTES
Face swelling and lethargic since yesterday.  Patient fell on Wednesday and hit head on tile.  Patient has small laceration on back of right side of head.  David Spence RN on 2/11/2019 at 11:09 AM

## 2019-02-11 NOTE — ED PROVIDER NOTES
History     Chief Complaint   Patient presents with     Head Injury     Patient fell on Wednesday  had no problems until yesterday   when facial swelling occured     HPI  Karishma Kimball is a 48 year old female who presents with a closed head injury that occurred last Wednesday overnight when she fell down approximately 5 stairs after falling forwards on tripping.  There were no presyncopal symptoms.  She denies loss of consciousness.  Since that time she has had on and off headaches although has chronic headaches and cannot differentiate these.  Also with right-sided neck pain and a history of chronic neck pain related to C6 fusion.  She notes that is a predisposition to falls she has had been having parasomnias and sleepwalking on her nightly sedatives.  She also has chronic ear drainage but no new drainage.  Her symptoms have been persistent since the fall.  She is able to ambulate in fact she was able to roller skate over the weekend.  She denies incoordination or ataxia.  There is no focal weakness extremities.  No change in mentation.  She notes after the fall she had swelling that is now descended into the scalp, neck and facial region on the right-hand side.    She does note a history of tobacco abuse.  Denies drug use.  Does have a history of alcohol abuse and denies current use - she tells me she stayed sober.      Allergies:  No Known Allergies    Problem List:    Patient Active Problem List    Diagnosis Date Noted     IUD (intrauterine device) in place 05/13/2013     Priority: High     mirena 5/4/2012      Mirena removed 4/13/17 and replaced 4/13/17  Lot [id number]  Exp.  12/19       Mild major depression (H) 05/13/2013     Priority: High     Followed by Dr. Lauren Fang  Still completing EMDR/complex PTSD    Appointment 12/31/13 with [facility] to establish with psychiatrist-DR. Lauren Singh left practice, now -[facility]-private practice in [place]    Followed by  psychiatry Dr. Singh, medication for insomnia, patient does not know name of medication, ssri/antidepressants made insomnia worse  Completing EMDR with Dr. Singh       Generalized anxiety disorder 11/16/2009     Priority: High     h/o panic attacks in social settings  Diagnosis updated by automated process. Provider to review and confirm.       Tobacco abuse 07/09/2018     Priority: Medium     Alcohol use disorder 02/17/2017     Priority: Medium     Discharged from [facility] winter 2017.  Worried about relapse early 2018 but issue resolved with  getting alcohol out of house.       Persistent insomnia 05/03/2016     Priority: Medium     Substance abuse (H) 05/03/2016     Priority: Medium     Hypertension 06/08/2015     Priority: Medium     Reported by patient. 06/08/2015       History of loop electrical excision procedure (LEEP) 08/29/2014     Priority: Medium      s/p LEEP (2000) - annual paps for 20 years  8/29/14-NIL, HPV neg, -Endometrial cells present. Patient with mirena in place, no irregular bleeding,PLAN repeat pap with cotesting in 1 year  9/30/15:Pap:NIL/neg HPV.  4/13/17:Pap: NIL/neg HPV.   7/9/18 NIL Pap, Neg HPV. Plan routine screening per [facility]CCP algorithm. Plan routine screening (cotest in 5 years).        Dissociative disorder 11/30/2013     Priority: Medium     depersonalization       Brachial neuritis or radiculitis 09/16/2013     Priority: Medium     Problem list name updated by automated process. Provider to review          Past Medical History:    Past Medical History:   Diagnosis Date     Abnormal Pap smear 2000     Arthritis      CARDIOVASCULAR SCREENING; LDL GOAL LESS THAN 160      IUD (intrauterine device) in place 5/4/2012       Past Surgical History:    Past Surgical History:   Procedure Laterality Date     COLONOSCOPY N/A 9/17/2018    Procedure: COLONOSCOPY;  colonoscopy;  Surgeon: Roscoe Warren MD;  Location: [facility]     FUSION CERVICAL ANTERIOR ONE LEVEL  9/16/2013    Procedure:  FUSION CERVICAL ANTERIOR ONE LEVEL;  ANTERIOR CERVICAL DECOMPRESSION AND FUSION C5-6 WITH INSTRUMENTATION, ALLOGRAFT AND BONE MARROW ASPIRATION OF THE LEFT ILIAC CREST ;  Surgeon: Randy Jameson MD;  Location: [facility]     GRAFT BONE FROM ILIAC CREST  9/16/2013    Procedure: GRAFT BONE FROM ILIAC CREST;;  Surgeon: Randy Jameson MD;  Location: [facility]     HC COLP CERVIX/UPPER VAGINA W LOOP ELEC BX CERVIX       SURGICAL HISTORY OF -       Colposcopy with loop electrode excision of the cervix       Family History:    Family History   Problem Relation Age of Onset     Diabetes Maternal Grandmother      Hypertension Maternal Grandmother      Substance Abuse Maternal Grandmother      Diabetes Paternal Grandmother      Substance Abuse Paternal Grandmother      Alcohol/Drug Father         alcohol     Anxiety Disorder Father      Substance Abuse Father      Alcohol/Drug Sister         drugs     Substance Abuse Sister      Depression Sister      Anxiety Disorder Sister      Anxiety Disorder Mother      Substance Abuse Mother      Depression Mother      Substance Abuse Maternal Grandfather      Substance Abuse Paternal Grandfather      Anxiety Disorder Daughter        Social History:  Marital Status:   [2]  Social History     Tobacco Use     Smoking status: Light Tobacco Smoker     Packs/day: 0.50     Types: Cigarettes     Smokeless tobacco: Never Used     Tobacco comment: previously had quit x 15 yrs, describes as off/on smoker, < 1 ppd   Substance Use Topics     Alcohol use: No     Alcohol/week: 0.0 oz     Drug use: No        Medications:      ** PATIENT ALERT **   amitriptyline (ELAVIL) 50 MG tablet   IBUPROFEN PO   levonorgestrel (MIRENA) 20 MCG/24HR IUD   temazepam (RESTORIL) 15 MG capsule         Review of Systems   Constitutional: Negative for chills, diaphoresis and fever.   HENT: Negative for ear pain, sinus pressure and sore throat.    Eyes: Negative for visual disturbance.   Respiratory: Negative  for cough, shortness of breath and wheezing.    Cardiovascular: Negative for chest pain and palpitations.   Gastrointestinal: Negative for abdominal pain, blood in stool, constipation, diarrhea, nausea and vomiting.   Genitourinary: Negative for dysuria, frequency and urgency.   Musculoskeletal: Positive for neck pain.   Skin: Negative for rash.   Neurological: Positive for tremors (chronically) and headaches.   All other systems reviewed and are negative.      Physical Exam   BP: (!) 149/105  Pulse: 88  Temp: 99.5  F (37.5  C)  Resp: 16  SpO2: 98 %      Physical Exam   Constitutional: She is oriented to person, place, and time. She appears distressed.   HENT:   Head: Head is without raccoon's eyes and without Lainez's sign.   Right Ear: No drainage.   Left Ear: No drainage.   Nose: No rhinorrhea.   Mouth/Throat: Oropharynx is clear and moist.   Eyes: Conjunctivae and EOM are normal. Pupils are equal, round, and reactive to light.   Neck: Neck supple. Spinous process tenderness (mild c6.) and muscular tenderness (right side) present. No neck rigidity. Normal range of motion present.   Cardiovascular: Normal rate and regular rhythm. Exam reveals no friction rub.   No murmur heard.  Pulmonary/Chest: Effort normal and breath sounds normal. No stridor. No respiratory distress. She has no wheezes. She has no rales.   Musculoskeletal: She exhibits no edema.   Lymphadenopathy:     She has cervical adenopathy (post-auricular).   Neurological: She is alert and oriented to person, place, and time. No cranial nerve deficit or sensory deficit. She exhibits normal muscle tone. Coordination normal. GCS eye subscore is 4. GCS verbal subscore is 5. GCS motor subscore is 6.   Skin: No rash noted. She is not diaphoretic.     There is some tenderness palpation in the trapezius region right side.      ED Course        Procedures               Critical Care time:  none               Results for orders placed or performed during the  hospital encounter of 02/11/19 (from the past 24 hour(s))   Head CT w/o contrast    Narrative    CT SCAN OF THE HEAD WITHOUT CONTRAST   2/11/2019 11:49 AM     HISTORY: Headache, post traumatic; closed head injury.    TECHNIQUE:  Axial images of the head and coronal reformations without  IV contrast material. Radiation dose for this scan was reduced using  automated exposure control, adjustment of the mA and/or kV according  to patient size, or iterative reconstruction technique.    COMPARISON: None.    FINDINGS: There is no evidence of intracranial hemorrhage, mass, acute  infarct or anomaly. The ventricles are normal in size, shape and  configuration. The brain parenchyma and subarachnoid spaces are  normal.     The visualized portions of the sinuses and mastoids appear normal.    Right lateral scalp cutaneous injury and hematoma without underlying  fracture.      Impression    IMPRESSION:     1. Right lateral scalp cutaneous injury and hematoma without  underlying fracture.  2. No evidence of acute intracranial hemorrhage, mass, or herniation.    YANA REINA MD   Cervical spine CT w/o contrast    Narrative    CT CERVICAL SPINE WITHOUT CONTRAST February 11, 2019 11:50 AM     HISTORY: Midline cervical neck tenderness, following head trauma and  prior cervical fusion.     TECHNIQUE: Axial images of the cervical spine were obtained without  intravenous contrast. Multiplanar reformations were performed.  Radiation dose for this scan was reduced using automated exposure  control, adjustment of the mA and/or kV according to patient size, or  iterative reconstruction technique.    COMPARISON: Cervical spine x-ray 6/30/2017.    FINDINGS: There are postoperative changes of anterior fusion from C5  to C6. Fusion appears solid. Hardware appears intact without evidence  of fracture. No surrounding lucency around the surgical screws to  suggest loosening or infection. No evidence of fracture. No  prevertebral soft tissue  swelling. There is straightening of the  normal cervical lordosis. Vertebral body height is maintained. No  destructive osseous lesions. Moderate degenerative changes and loss of  intervertebral disc height at C4-C5 and C6-C7. Mild degenerative  changes and loss of disc height at the other levels. Asymmetric  right-sided facet hypertrophy at C2-C3. No high-grade spinal canal  narrowing at any level. Varying levels of neural foraminal narrowing  due to facet hypertrophy and uncinate spurring.    Visualized paraspinous tissues: Unremarkable.      Impression    IMPRESSION:   1. No evidence of acute fracture or subluxation in the cervical spine.  2. Postoperative changes of anterior fusion from C5 to C6.  3. Degenerative changes throughout the cervical spine as described  above.     YANA REINA MD       Medications - No data to display    Assessments & Plan (with Medical Decision Making)     MDM: Karishma Kimball is a 48 year old female presented with a closed head injury that occurred approximately 5 days ago -possibly due to parasomnias and sleepwalking related to her sleep medication.  She fell down approximately 5 stairs forward striking the right side of her parietal scalp.  There is been no loss of consciousness and she had persistent headache and swelling in this region that is now dependently shifted into the region of the neck.  Her persistent symptoms prompted her to be reevaluated in the emergency department.  She also had some neck spasm and C6 tenderness on palpation of the neck and a history of a C5-6 fusion.  CT imaging of the head and neck were reassuring with no new changes identified no fracture no intracranial bleeding.  Her neurologic exam was also reassuring.  Neck range of motion is full.  We discussed home management including for concussion and neck spasm.  Precautions are given for return.  Recommended follow-up in clinic.    I have reviewed the nursing notes.    I have reviewed the  findings, diagnosis, plan and need for follow up with the patient.             Medication List      There are no discharge medications for this visit.         Final diagnoses:   Closed head injury, initial encounter - No serious findings on head imaging. suspect post-concussion symptoms.   follow-up clinic. in some cases, post-concussion can linger and may need work restrictions.  return for worsening.   Neck muscle spasm       2/11/2019   [facility] EMERGENCY DEPARTMENT     Heron Toscano MD  02/11/19 [id number]

## 2019-02-11 NOTE — ED AVS SNAPSHOT
[facility] Emergency Department  [address] [address]  Phone:  [phone]  Fax:  [phone]                                    Karishma Kimball   MRN: [id number]    Department:  [facility] Emergency Department   Date of Visit:  2/11/2019           After Visit Summary Signature Page    I have received my discharge instructions, and my questions have been answered. I have discussed any challenges I see with this plan with the nurse or doctor.    ..........................................................................................................................................  Patient/Patient Representative Signature      ..........................................................................................................................................  Patient Representative Print Name and Relationship to Patient    ..................................................               ................................................  Date                                   Time    ..........................................................................................................................................  Reviewed by Signature/Title    ...................................................              ..............................................  Date                                               Time          22EPIC Rev 08/18

## 2019-02-11 NOTE — DISCHARGE INSTRUCTIONS
ICD-10-CM    1. Closed head injury, initial encounter S09.90XA     No serious findings on head imaging. suspect post-concussion symptoms.   follow-up clinic. in some cases, post-concussion can linger and may need work restrictions.  return for worsening.   2. Neck muscle spasm M62.838

## 2019-02-11 NOTE — ED NOTES
Patient has an old wound on right occipital area. Patient is alert oriented having swelling on right side of face. Patient fell down stairs in night  When she got up to get drink of water. Patient fell on Wednesday night.  She had no problems until yesterday when right side of face swelling. Patient has a puncture type wound on right side of head. Patient hit tile floor at bottom of stairs.  at bedside.Patient very anxious  States has white coat .

## 2019-02-12 DIAGNOSIS — F51.01 IDIOPATHIC INSOMNIA: ICD-10-CM

## 2019-02-12 NOTE — TELEPHONE ENCOUNTER
Patient called today.    Patient has an upcoming appointment with Dr. Blake at [facility] on April 8, 2019.    Patient needs medication Temazepram in 1 month.      Patient has enough medication for Amitriptyline at this time.    Thank you.    Central Scheduling  Carisa FLOYD

## 2019-02-13 NOTE — TELEPHONE ENCOUNTER
Contacted Karishma.   Temazepam prescription was sent to pharmacy on 01/03/2019 with refills.   Record shows she is in need of amitriptyline. Will request refill.   Yearly appointment scheduled.    Chief Complaint   Patient presents with     Refill Request     Temazepram      Refill Request     amitriptyline (ELAVIL) 50 MG tablet      Refill request received via phone by patient or caregiver      Pending Prescriptions:                       Disp   Refills    amitriptyline (ELAVIL) 50 MG tablet       90 tab*3            Sig: Take 3 tablets by mouth at bed time.         Last Written Prescription Date:  07/02/2018  Last Fill Quantity: 90 per 30 days,   # refills: 3  Last Office Visit with prescribing provider: 07/23/2018  Future Office visit:    Next 5 appointments (look out 90 days)    Feb 20, 2019 12:40 PM CST  SHORT with MONIQUE Sanz CNP  [facility] ([facility] [address] [place] [id number]  [phone]           Routing refill request to provider for review/approval because:  Drug not on the [facility], P or [facility] refill protocol or controlled substance

## 2019-02-15 RX ORDER — AMITRIPTYLINE HYDROCHLORIDE 50 MG/1
TABLET ORAL
Qty: 90 TABLET | Refills: 3 | Status: SHIPPED | OUTPATIENT
Start: 2019-02-15 | End: 2020-04-01

## 2019-02-20 ENCOUNTER — OFFICE VISIT (OUTPATIENT)
Dept: FAMILY MEDICINE | Facility: CLINIC | Age: 49
End: 2019-02-20
Payer: COMMERCIAL

## 2019-02-20 VITALS
TEMPERATURE: 98.2 F | DIASTOLIC BLOOD PRESSURE: 80 MMHG | SYSTOLIC BLOOD PRESSURE: 124 MMHG | BODY MASS INDEX: 21.33 KG/M2 | WEIGHT: 128 LBS | RESPIRATION RATE: 18 BRPM | HEIGHT: 65 IN | HEART RATE: 84 BPM

## 2019-02-20 DIAGNOSIS — S06.0X9D CONCUSSION WITH LOSS OF CONSCIOUSNESS, SUBSEQUENT ENCOUNTER: Primary | ICD-10-CM

## 2019-02-20 DIAGNOSIS — Z71.6 ENCOUNTER FOR SMOKING CESSATION COUNSELING: ICD-10-CM

## 2019-02-20 PROCEDURE — 99214 OFFICE O/P EST MOD 30 MIN: CPT | Performed by: NURSE PRACTITIONER

## 2019-02-20 ASSESSMENT — PATIENT HEALTH QUESTIONNAIRE - PHQ9
SUM OF ALL RESPONSES TO PHQ QUESTIONS 1-9: 1
SUM OF ALL RESPONSES TO PHQ QUESTIONS 1-9: 1
10. IF YOU CHECKED OFF ANY PROBLEMS, HOW DIFFICULT HAVE THESE PROBLEMS MADE IT FOR YOU TO DO YOUR WORK, TAKE CARE OF THINGS AT HOME, OR GET ALONG WITH OTHER PEOPLE: NOT DIFFICULT AT ALL

## 2019-02-20 ASSESSMENT — ANXIETY QUESTIONNAIRES
7. FEELING AFRAID AS IF SOMETHING AWFUL MIGHT HAPPEN: NOT AT ALL
4. TROUBLE RELAXING: SEVERAL DAYS
2. NOT BEING ABLE TO STOP OR CONTROL WORRYING: NOT AT ALL
6. BECOMING EASILY ANNOYED OR IRRITABLE: NOT AT ALL
3. WORRYING TOO MUCH ABOUT DIFFERENT THINGS: NOT AT ALL
GAD7 TOTAL SCORE: 1
5. BEING SO RESTLESS THAT IT IS HARD TO SIT STILL: NOT AT ALL
1. FEELING NERVOUS, ANXIOUS, OR ON EDGE: NOT AT ALL
GAD7 TOTAL SCORE: 1
GAD7 TOTAL SCORE: 1
7. FEELING AFRAID AS IF SOMETHING AWFUL MIGHT HAPPEN: NOT AT ALL

## 2019-02-20 ASSESSMENT — MIFFLIN-ST. JEOR: SCORE: 1193.54

## 2019-02-20 NOTE — PATIENT INSTRUCTIONS
"  Patient Education   Concussion Discharge Instructions  You were seen today for signs of a concussion. The symptoms will vary, depending on the nature of your injury and your health. You may have: headache, confusion, nausea (feel sick to your stomach), vomiting (throwing up) and problems with memory, concentrating or sleep. You may feel dizzy, irritable, and tired.   Children and teens may need help from their parents, teachers and coaches to watch for symptoms as they recover.  Follow-up  It is important for you to see a doctor for follow-up care to see how you are recovering. Please see your primary doctor within the next 5 to 7 days. You may have also been referred to the Concussion  service. They will contact you and arrange a follow-up visit if needed. If you need help sooner, you may call them at [phone].  Warning signs  Call your doctor or come back to Emergency if you suddenly have any of these symptoms:    Headaches that get worse    Feeling more and more drowsy    You keep repeating yourself    Strange behavior    Seizures    Repeat vomiting (throwing up)    Trouble walking    Growing confusion    Feeling more irritable    Neck pain that gets worse    Slurred speech    Weakness or numbness    Loss of consciousness    Fluid or blood coming from ears or nose  Self-care    Get lots of rest and get enough sleep at night. Take daytime naps or rest if you feel tired.    Limit physical activity and \"thinking\" activities. These can make symptoms worse.  ? Physical activity includes gym, sports, weight training, running, exercise and heavy lifting.  ? Thinking activities include homework, class work, job-related work and screen time (phone, computer, tablet, TV and video games).    Stick to a healthy diet and drink lots of fluids.    As symptoms improve, you may slowly return to your daily activities. If symptoms get worse   or return, reduce your activity.    Know that it is normal to feel sad and " frustrated when you do not feel right and are less active.  Going back to work    Your care team will tell you when you are ready to return to work.    Limit the amount of work you do soon after your injury. This may speed healing. Take breaks if your symptoms get worse. You should also reduce your physical activity as well as activities that require a lot of thinking until you see your doctor.    You may need shorter work days and a lighter workload.    Avoid heavy lifting, working with machinery, driving and working at heights until your symptoms are gone or you are cleared by a doctor.  Returning to sports    Never return to play if you have any symptoms. A full recovery will reduce the chances of getting hurt again. Remember, it is better to miss one or two games than a whole season.    You should rest from all physical activity until you see your doctor. Generally, if all symptoms have completely cleared, your doctor can help guide you to slowly return to sports. If symptoms return or worsen, stop the activity and see your doctor.    Important: If you are in an organized sport and under age 18, you will need written consent from a healthcare provider before you return to sports. Typically, this will be your primary care or sports medicine doctor. Please make an appointment.  Going back to school    If you are still having symptoms, you may need extra help at school.    Tell your teachers and school nurse about your injury and symptoms. Ask them to watch for problems with learning, memory and concentrating. Symptoms may get worse when you do schoolwork, and you may become more irritable.    You may need shorter school days, a reduced workload, and to postpone testing.    Do not drive or take gym class (physical activity) until cleared by a doctor.    For informational purposes only. Not to replace the advice of your health care provider.   2009 [employer]. Used with permission.  "This form is adapted from the \"Heads Up: Brain Injury in Your Practice\" tool kit developed by the Centers for Disease Control and Prevention (CDC). All rights reserved. [facility]. [employer] [id number] - Rev 03/17.       "

## 2019-02-20 NOTE — PROGRESS NOTES
SUBJECTIVE:   Karishma Kimball is a 48 year old female who presents to clinic today for the following health issues:    ED/UC Followup:    Facility:  [facility] Emergency Department  Date of visit: 2/11/19  Reason for visit: closed head injury, neck muscle spasm  Current Status: Patient states that she has been feeling very tired, has been sleeping a lot, and has had some confusion.         Problem list and histories reviewed & adjusted, as indicated.  Additional history: as documented    Patient Active Problem List   Diagnosis     Generalized anxiety disorder     IUD (intrauterine device) in place     Mild major depression (H)     Brachial neuritis or radiculitis     Dissociative disorder     History of loop electrical excision procedure (LEEP)     Hypertension     Persistent insomnia     Substance abuse (H)     Alcohol use disorder     Tobacco abuse     Past Surgical History:   Procedure Laterality Date     COLONOSCOPY N/A 9/17/2018    Procedure: COLONOSCOPY;  colonoscopy;  Surgeon: Roscoe Warren MD;  Location: [facility]     FUSION CERVICAL ANTERIOR ONE LEVEL  9/16/2013    Procedure: FUSION CERVICAL ANTERIOR ONE LEVEL;  ANTERIOR CERVICAL DECOMPRESSION AND FUSION C5-6 WITH INSTRUMENTATION, ALLOGRAFT AND BONE MARROW ASPIRATION OF THE LEFT ILIAC CREST ;  Surgeon: Randy Jameson MD;  Location: [facility]     GRAFT BONE FROM ILIAC CREST  9/16/2013    Procedure: GRAFT BONE FROM ILIAC CREST;;  Surgeon: Randy Jameson MD;  Location: [facility]     HC COLP CERVIX/UPPER VAGINA W LOOP ELEC BX CERVIX       SURGICAL HISTORY OF -       Colposcopy with loop electrode excision of the cervix       Social History     Tobacco Use     Smoking status: Light Tobacco Smoker     Packs/day: 0.50     Types: Cigarettes     Smokeless tobacco: Never Used     Tobacco comment: previously had quit x 15 yrs, describes as off/on smoker, < 1 ppd   Substance Use Topics     Alcohol use: No     Alcohol/week: 0.0 oz     Family History    Problem Relation Age of Onset     Diabetes Maternal Grandmother      Hypertension Maternal Grandmother      Substance Abuse Maternal Grandmother      Diabetes Paternal Grandmother      Substance Abuse Paternal Grandmother      Alcohol/Drug Father         alcohol     Anxiety Disorder Father      Substance Abuse Father      Alcohol/Drug Sister         drugs     Substance Abuse Sister      Depression Sister      Anxiety Disorder Sister      Anxiety Disorder Mother      Substance Abuse Mother      Depression Mother      Substance Abuse Maternal Grandfather      Substance Abuse Paternal Grandfather      Anxiety Disorder Daughter          Current Outpatient Medications   Medication Sig Dispense Refill     ** PATIENT ALERT ** MAXIMUM Ibuprofen FROM ALL SOURCES SHOULD NOT EXCEED 4000MG IN 24 HOURS       amitriptyline (ELAVIL) 50 MG tablet Take 3 tablets by mouth at bed time. 90 tablet 3     IBUPROFEN PO        temazepam (RESTORIL) 15 MG capsule Take 1-2 capsules by mouth 15-30 minutes before bed.  Start with 1 capsule, ok to increase to 2 capsules if residual insomnia after 3-4 days. 60 capsule 5     levonorgestrel (MIRENA) 20 MCG/24HR IUD 1 each (20 mcg) by Intrauterine route once for 1 dose 1 each 0     No Known Allergies  Recent Labs   Lab Test 07/09/18  1049 05/03/16  1438 06/08/15  1042 04/25/12  1028 04/25/12  1027   LDL 37  --   --   --  61     --   --   --  97   TRIG 24  --   --   --  27   ALT  --  54* 268*  --   --    CR  --  0.66 0.77  --   --    GFRESTIMATED  --  >90  Non  GFR Calc   80  --   --    GFRESTBLACK  --  >90   GFR Calc   >90   GFR Calc    --   --    POTASSIUM  --  4.8 4.5  --   --    TSH  --   --   --  1.24  --       BP Readings from Last 3 Encounters:   02/20/19 124/80   02/11/19 (!) 139/105   09/17/18 (!) 134/98    Wt Readings from Last 3 Encounters:   02/20/19 58.1 kg (128 lb)   09/17/18 63.5 kg (140 lb)   07/23/18 63.8 kg (140 lb 9.6 oz)     "                Reviewed and updated as needed this visit by clinical staff       Reviewed and updated as needed this visit by Provider         ROS:  Constitutional, HEENT, cardiovascular, pulmonary, gi and gu systems are negative, except as otherwise noted.    OBJECTIVE:     /80 (BP Location: Right arm, Cuff Size: Adult Regular)   Pulse 84   Temp 98.2  F (36.8  C) (Tympanic)   Resp 18   Ht 1.638 m (5' 4.5\")   Wt 58.1 kg (128 lb)   BMI 21.63 kg/m    Body mass index is 21.63 kg/m .  GENERAL: healthy, alert and no distress  EYES: Eyes grossly normal to inspection, PERRL and conjunctivae and sclerae normal  HENT: ear canals and TM's normal, nose and mouth without ulcers or lesions  NECK: no adenopathy, no asymmetry, masses, or scars and thyroid normal to palpation  RESP: lungs clear to auscultation - no rales, rhonchi or wheezes  CV: regular rate and rhythm, normal S1 S2, no S3 or S4, no murmur, click or rub, no peripheral edema and peripheral pulses strong  ABDOMEN: soft, nontender, no hepatosplenomegaly, no masses and bowel sounds normal  MS: no gross musculoskeletal defects noted, no edema  SKIN: no suspicious lesions or rashes  NEURO: Normal strength and tone, mentation intact and speech normal  NEURO: Normal strength and tone, sensory exam grossly normal and mentation intact  PSYCH: mentation appears normal, affect normal/bright        ASSESSMENT/PLAN:     (S06.0X9D) Concussion with loss of consciousness, subsequent encounter  (primary encounter diagnosis)  Comment: Patient continues to have mild intermittent symptoms will have patient follow-up with concussion clinic.  Plan: CONCUSSION  REFERRAL      (Z71.6) Encounter for smoking cessation counseling  Comment: Patient would like to stop smoking  Plan: Would like to use nicotine gum no prescription for nicotine gum patient can obtain that over-the-counter.  Did also give her information about quit smoking plan.     Reyna Perez, APRN " [facility]

## 2019-02-21 ASSESSMENT — ANXIETY QUESTIONNAIRES: GAD7 TOTAL SCORE: 1

## 2019-07-23 DIAGNOSIS — G47.00 PERSISTENT INSOMNIA: Chronic | ICD-10-CM

## 2019-07-23 DIAGNOSIS — F41.1 GENERALIZED ANXIETY DISORDER: Chronic | ICD-10-CM

## 2019-07-23 DIAGNOSIS — F33.0 MAJOR DEPRESSIVE DISORDER, RECURRENT EPISODE, MILD (H): Chronic | ICD-10-CM

## 2019-07-23 NOTE — TELEPHONE ENCOUNTER
Chief Complaint   Patient presents with     Refill Request     temazepam 15mg       Refill request received via fax by pharmacy      [facility], [address]      Karishma requests prescriptions be faxed to    [facility], [address]    Pending Prescriptions:                       Disp   Refills    temazepam (RESTORIL) 15 MG capsule        60 cap*5            Sig: Take 1-2 capsules by mouth 15-30 minutes before           bed.  Start with 1 capsule, ok to increase to 2           capsules if residual insomnia after 3-4 days.         Last Written Prescription Date:  1/3/2019  Last Fill Quantity: 60,   # refills: 5  Last Office Visit with prescribing provider: 7/23/18  Future Office visit: 9/27/19      Routing refill request to provider for review/approval because:  Drug not on the [facility], [facility] or [facility] refill protocol or controlled substance

## 2019-07-25 RX ORDER — TEMAZEPAM 15 MG/1
CAPSULE ORAL
Qty: 60 CAPSULE | Refills: 5 | Status: SHIPPED | OUTPATIENT
Start: 2019-07-25 | End: 2020-02-27

## 2019-08-14 ENCOUNTER — HOSPITAL ENCOUNTER (OUTPATIENT)
Dept: MAMMOGRAPHY | Facility: CLINIC | Age: 49
Discharge: HOME | End: 2019-08-14
Attending: PHYSICIAN ASSISTANT | Admitting: PHYSICIAN ASSISTANT
Payer: COMMERCIAL

## 2019-08-14 DIAGNOSIS — Z12.31 VISIT FOR SCREENING MAMMOGRAM: ICD-10-CM

## 2019-08-14 PROCEDURE — 77067 SCR MAMMO BI INCL CAD: CPT

## 2019-09-27 ENCOUNTER — OFFICE VISIT (OUTPATIENT)
Dept: SLEEP MEDICINE | Facility: CLINIC | Age: 49
End: 2019-09-27
Payer: COMMERCIAL

## 2019-09-27 VITALS
BODY MASS INDEX: 23.73 KG/M2 | DIASTOLIC BLOOD PRESSURE: 100 MMHG | WEIGHT: 139 LBS | SYSTOLIC BLOOD PRESSURE: 137 MMHG | HEIGHT: 64 IN | OXYGEN SATURATION: 99 % | HEART RATE: 93 BPM

## 2019-09-27 DIAGNOSIS — F51.05 INSOMNIA DUE TO OTHER MENTAL DISORDER: Primary | ICD-10-CM

## 2019-09-27 DIAGNOSIS — F99 INSOMNIA DUE TO OTHER MENTAL DISORDER: Primary | ICD-10-CM

## 2019-09-27 PROCEDURE — 99214 OFFICE O/P EST MOD 30 MIN: CPT | Performed by: FAMILY MEDICINE

## 2019-09-27 ASSESSMENT — MIFFLIN-ST. JEOR: SCORE: 1230.5

## 2019-09-27 NOTE — PATIENT INSTRUCTIONS
Your BMI is Body mass index is 23.86 kg/m .  Weight management is a personal decision.  If you are interested in exploring weight loss strategies, the following discussion covers the approaches that may be successful. Body mass index (BMI) is one way to tell whether you are at a healthy weight, overweight, or obese. It measures your weight in relation to your height.  A BMI of 18.5 to 24.9 is in the healthy range. A person with a BMI of 25 to 29.9 is considered overweight, and someone with a BMI of 30 or greater is considered obese. More than two-thirds of American adults are considered overweight or obese.  Being overweight or obese increases the risk for further weight gain. Excess weight may lead to heart disease and diabetes.  Creating and following plans for healthy eating and physical activity may help you improve your health.  Weight control is part of healthy lifestyle and includes exercise, emotional health, and healthy eating habits. Careful eating habits lifelong are the mainstay of weight control. Though there are significant health benefits from weight loss, long-term weight loss with diet alone may be very difficult to achieve- studies show long-term success with dietary management in less than 10% of people. Attaining a healthy weight may be especially difficult to achieve in those with severe obesity. In some cases, medications, devices and surgical management might be considered.  What can you do?  If you are overweight or obese and are interested in methods for weight loss, you should discuss this with your provider.     Consider reducing daily calorie intake by 500 calories.     Keep a food journal.     Avoiding skipping meals, consider cutting portions instead.    Diet combined with exercise helps maintain muscle while optimizing fat loss. Strength training is particularly important for building and maintaining muscle mass. Exercise helps reduce stress, increase energy, and improves fitness.  Increasing exercise without diet control, however, may not burn enough calories to loose weight.       Start walking three days a week 10-20 minutes at a time    Work towards walking thirty minutes five days a week     Eventually, increase the speed of your walking for 1-2 minutes at time    In addition, we recommend that you review healthy lifestyles and methods for weight loss available through the National Institutes of Health patient information sites:  http://win.niddk.nih.gov/publications/index.htm    And look into health and wellness programs that may be available through your health insurance provider, employer, local community center, or sury club.
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
## 2020-01-05 ENCOUNTER — OFFICE VISIT (OUTPATIENT)
Dept: URGENT CARE | Facility: URGENT CARE | Age: 50
End: 2020-01-05
Payer: COMMERCIAL

## 2020-01-05 VITALS
OXYGEN SATURATION: 98 % | BODY MASS INDEX: 25.23 KG/M2 | HEART RATE: 95 BPM | DIASTOLIC BLOOD PRESSURE: 89 MMHG | RESPIRATION RATE: 14 BRPM | SYSTOLIC BLOOD PRESSURE: 134 MMHG | TEMPERATURE: 98 F | WEIGHT: 147 LBS

## 2020-01-05 DIAGNOSIS — R05.9 COUGH: Primary | ICD-10-CM

## 2020-01-05 LAB
FLUAV+FLUBV AG SPEC QL: NEGATIVE
FLUAV+FLUBV AG SPEC QL: NEGATIVE
SPECIMEN SOURCE: NORMAL

## 2020-01-05 PROCEDURE — 87804 INFLUENZA ASSAY W/OPTIC: CPT | Performed by: FAMILY MEDICINE

## 2020-01-05 PROCEDURE — 99213 OFFICE O/P EST LOW 20 MIN: CPT | Mod: 25 | Performed by: FAMILY MEDICINE

## 2020-01-05 PROCEDURE — 90471 IMMUNIZATION ADMIN: CPT | Performed by: FAMILY MEDICINE

## 2020-01-05 ASSESSMENT — ENCOUNTER SYMPTOMS
RHINORRHEA: 0
ENDOCRINE NEGATIVE: 1
FATIGUE: 1
NEUROLOGICAL NEGATIVE: 1
GASTROINTESTINAL NEGATIVE: 1
HEMATOLOGIC/LYMPHATIC NEGATIVE: 1
CARDIOVASCULAR NEGATIVE: 1
ALLERGIC/IMMUNOLOGIC NEGATIVE: 1
MUSCULOSKELETAL NEGATIVE: 1
PSYCHIATRIC NEGATIVE: 1
COUGH: 1
EYES NEGATIVE: 1

## 2020-01-05 NOTE — PROGRESS NOTES
SUBJECTIVE:   Karishma Kimball is a 49 year old female presenting with a chief complaint of   Chief Complaint   Patient presents with     Cough     12/30/19, cold sweats, weakness, sweats, fatigue, shortness of breath        She is an established patient of [facility].    URI Adult    Onset of symptoms was 1 week(s) ago.  Course of illness is same.    Severity moderate  Current and Associated symptoms: fever, chills, sweats, cough - non-productive and body aches  Treatment measures tried include Tylenol/Ibuprofen and Decongestants.  Predisposing factors include None.      Patient comes in with cough /URI symptoms started about a week ago. Symptoms have been waxing and waning. She reports sweats and chills and body aches.        Review of Systems   Constitutional: Positive for fatigue.   HENT: Positive for postnasal drip. Negative for rhinorrhea.    Eyes: Negative.    Respiratory: Positive for cough.    Cardiovascular: Negative.    Gastrointestinal: Negative.    Endocrine: Negative.    Genitourinary: Negative.    Musculoskeletal: Negative.    Skin: Negative.    Allergic/Immunologic: Negative.    Neurological: Negative.    Hematological: Negative.    Psychiatric/Behavioral: Negative.        Past Medical History:   Diagnosis Date     Abnormal Pap smear 2000     s/p LEEP - annual paps for 20 years     Arthritis     cervical neck     CARDIOVASCULAR SCREENING; LDL GOAL LESS THAN 160      IUD (intrauterine device) in place 5/4/2012    mirena     Family History   Problem Relation Age of Onset     Diabetes Maternal Grandmother      Hypertension Maternal Grandmother      Substance Abuse Maternal Grandmother      Diabetes Paternal Grandmother      Substance Abuse Paternal Grandmother      Alcohol/Drug Father         alcohol     Anxiety Disorder Father      Substance Abuse Father      Alcohol/Drug Sister         drugs     Substance Abuse Sister      Depression Sister      Anxiety Disorder Sister      Anxiety Disorder Mother       Substance Abuse Mother      Depression Mother      Substance Abuse Maternal Grandfather      Substance Abuse Paternal Grandfather      Anxiety Disorder Daughter      Current Outpatient Medications   Medication Sig Dispense Refill     ** PATIENT ALERT ** MAXIMUM Ibuprofen FROM ALL SOURCES SHOULD NOT EXCEED 4000MG IN 24 HOURS       amitriptyline (ELAVIL) 50 MG tablet Take 3 tablets by mouth at bed time. (Patient taking differently: Take 1/2 tablet by mouth at bed time.) 90 tablet 3     IBUPROFEN PO        temazepam (RESTORIL) 15 MG capsule Take 1-2 capsules by mouth 15-30 minutes before bed.  Start with 1 capsule, ok to increase to 2 capsules if residual insomnia after 3-4 days. 60 capsule 5     levonorgestrel (MIRENA) 20 MCG/24HR IUD 1 each (20 mcg) by Intrauterine route once for 1 dose 1 each 0     Social History     Tobacco Use     Smoking status: Light Tobacco Smoker     Packs/day: 0.50     Types: Cigarettes, Other     Smokeless tobacco: Never Used     Tobacco comment: Does JUUL e cig    Substance Use Topics     Alcohol use: No     Alcohol/week: 0.0 standard drinks       OBJECTIVE  /89   Pulse 95   Temp 98  F (36.7  C) (Tympanic)   Resp 14   Wt 66.7 kg (147 lb)   SpO2 98%   BMI 25.23 kg/m      Physical Exam  Constitutional:       Appearance: Normal appearance.   HENT:      Head: Normocephalic and atraumatic.      Right Ear: Tympanic membrane normal.      Left Ear: Tympanic membrane normal.      Nose: Nose normal.      Mouth/Throat:      Mouth: Mucous membranes are moist.   Eyes:      Extraocular Movements: Extraocular movements intact.      Pupils: Pupils are equal, round, and reactive to light.   Neck:      Musculoskeletal: Normal range of motion and neck supple.   Cardiovascular:      Rate and Rhythm: Normal rate and regular rhythm.      Pulses: Normal pulses.      Heart sounds: Normal heart sounds.   Pulmonary:      Effort: Pulmonary effort is normal.      Breath sounds: Normal breath sounds.    Abdominal:      General: Abdomen is flat.      Palpations: Abdomen is soft.   Musculoskeletal: Normal range of motion.   Skin:     General: Skin is warm.   Neurological:      General: No focal deficit present.      Mental Status: She is alert and oriented to person, place, and time.   Psychiatric:         Mood and Affect: Mood normal.         Behavior: Behavior normal.         Labs:  Results for orders placed or performed in visit on 01/05/20 (from the past 24 hour(s))   Influenza A/B antigen   Result Value Ref Range    Influenza A/B Agn Specimen Nasal     Influenza A Negative NEG^Negative    Influenza B Negative NEG^Negative       X-Ray was not done.    ASSESSMENT:      ICD-10-CM    1. Cough R05 Influenza A/B antigen      Likely a viral bronchitis - patient was reassured, recommend increase in fluids, if symptoms persist she is asked to follow up with her primary care doctor.Influenza was negative   Medical Decision Making:        Differential Diagnosis:  URI Adult/Peds:  Bronchitis-viral        PLAN:    URI Adult:  Tylenol, Ibuprofen, Fluids, Rest and Rx bronchitis  Avoid unless comorbid conditions       Followup:    If not improving or if condition worsens, follow up with your Primary Care Provider    Patient Instructions       Patient Education     Viral Upper Respiratory Illness (Adult)    You have a viral upper respiratory illness (URI), which is another term for the common cold. This illness is contagious during the first few days. It is spread through the air by coughing and sneezing. It may also be spread by direct contact (touching the sick person and then touching your own eyes, nose, or mouth). Frequent handwashing will decrease risk of spread. Most viral illnesses go away within 7 to 10 days with rest and simple home remedies. Sometimes the illness may last for several weeks. Antibiotics will not kill a virus, and they are generally not prescribed for this condition.  Home care    If symptoms are  severe, rest at home for the first 2 to 3 days. When you resume activity, don't let yourself get too tired.    Don't smoke. If you need help stopping, talk with your healthcare provider.    Avoid being exposed to cigarette smoke (yours or others ).    You may use acetaminophen or ibuprofen to control pain and fever, unless another medicine was prescribed. If you have chronic liver or kidney disease, have ever had a stomach ulcer or gastrointestinal bleeding, or are taking blood-thinning medicines, talk with your healthcare provider before using these medicines. Aspirin should never be given to anyone under 18 years of age who is ill with a viral infection or fever. It may cause severe liver or brain damage.    Your appetite may be poor, so a light diet is fine. Stay well hydrated by drinking 6 to 8 glasses of fluids per day (water, soft drinks, juices, tea, or soup). Extra fluids will help loosen secretions in the nose and lungs.    Over-the-counter cold medicines will not shorten the length of time you re sick, but they may be helpful for the following symptoms: cough, sore throat, and nasal and sinus congestion. If you take prescription medicines, ask your healthcare provider or pharmacist which over-the-counter medicines are safe to use. (Note: Don't use decongestants if you have high blood pressure.)  Follow-up care  Follow up with your healthcare provider, or as advised.  When to seek medical advice  Call your healthcare provider right away if any of these occur:    Cough with lots of colored sputum (mucus)    Severe headache; face, neck, or ear pain    Difficulty swallowing due to throat pain    Fever of 100.4 F (38 C) or higher, or as directed by your healthcare provider  Call 911  Call 911 if any of these occur:    Chest pain, shortness of breath, wheezing, or difficulty breathing    Coughing up blood    Very severe pain with swallowing, especially if it goes along with a muffled voice   Date Last Reviewed:  6/1/2018 2000-2019 [employer]. [address] [place] [address], [place] [address]. All rights reserved. This information is not intended as a substitute for professional medical care. Always follow your healthcare professional's instructions.

## 2020-01-05 NOTE — PATIENT INSTRUCTIONS

## 2020-01-16 ENCOUNTER — APPOINTMENT (OUTPATIENT)
Dept: GENERAL RADIOLOGY | Facility: CLINIC | Age: 50
End: 2020-01-16
Attending: NURSE PRACTITIONER
Payer: COMMERCIAL

## 2020-01-16 ENCOUNTER — HOSPITAL ENCOUNTER (EMERGENCY)
Facility: CLINIC | Age: 50
Discharge: HOME | End: 2020-01-16
Attending: NURSE PRACTITIONER | Admitting: NURSE PRACTITIONER
Payer: COMMERCIAL

## 2020-01-16 ENCOUNTER — TELEPHONE (OUTPATIENT)
Dept: FAMILY MEDICINE | Facility: CLINIC | Age: 50
End: 2020-01-16

## 2020-01-16 VITALS
DIASTOLIC BLOOD PRESSURE: 90 MMHG | WEIGHT: 140 LBS | TEMPERATURE: 98 F | RESPIRATION RATE: 18 BRPM | SYSTOLIC BLOOD PRESSURE: 155 MMHG | BODY MASS INDEX: 24.03 KG/M2 | OXYGEN SATURATION: 99 %

## 2020-01-16 DIAGNOSIS — S42.209A PROXIMAL HUMERUS FRACTURE: ICD-10-CM

## 2020-01-16 PROCEDURE — 23600 CLTX PROX HUMRL FX W/O MNPJ: CPT | Mod: LT | Performed by: NURSE PRACTITIONER

## 2020-01-16 PROCEDURE — 25000132 ZZH RX MED GY IP 250 OP 250 PS 637: Performed by: NURSE PRACTITIONER

## 2020-01-16 PROCEDURE — G0463 HOSPITAL OUTPT CLINIC VISIT: HCPCS | Mod: 25 | Performed by: NURSE PRACTITIONER

## 2020-01-16 PROCEDURE — 99213 OFFICE O/P EST LOW 20 MIN: CPT | Mod: 25 | Performed by: NURSE PRACTITIONER

## 2020-01-16 PROCEDURE — 73030 X-RAY EXAM OF SHOULDER: CPT | Mod: LT

## 2020-01-16 RX ORDER — OXYCODONE AND ACETAMINOPHEN 5; 325 MG/1; MG/1
1 TABLET ORAL ONCE
Status: COMPLETED | OUTPATIENT
Start: 2020-01-16 | End: 2020-01-16

## 2020-01-16 RX ADMIN — OXYCODONE HYDROCHLORIDE AND ACETAMINOPHEN 1 TABLET: 5; 325 TABLET ORAL at 15:00

## 2020-01-16 ASSESSMENT — ENCOUNTER SYMPTOMS
HEADACHES: 0
ARTHRALGIAS: 1
BACK PAIN: 0
FEVER: 0
SHORTNESS OF BREATH: 0
NUMBNESS: 0
NECK PAIN: 0
LIGHT-HEADEDNESS: 0
DIZZINESS: 0
WOUND: 0
COUGH: 0

## 2020-01-16 NOTE — ED PROVIDER NOTES
"  History     Chief Complaint   Patient presents with     Shoulder Pain     pt fell of hover board at approx at 14:00 landed on left shoulder decrease ROM left shoulder CMS intact pt stated \" I popped it back in \"spoke with Paty MEDELLIN to start in [facility]  Karishma Kimball is a 49 year old female who presents to the urgent care for evaluation of left shoulder pain. Today patient was using a hover board when she fell onto her left arm. She is unsure how she landed onto the arm but had immediate pain and felt the arm was 'popped out of place'. She states she popped it back into place but came in for evaluation due to continued pain with movement. Denies numbness and tingling. States she has very limited ROM to the left arm due to the pain. No associated head injury with this fall. Has not taken any medication prior to arrival.     Allergies:  No Known Allergies    Problem List:    Patient Active Problem List    Diagnosis Date Noted     IUD (intrauterine device) in place 05/13/2013     Priority: High     mirena 5/4/2012      Mirena removed 4/13/17 and replaced 4/13/17  Lot [id number]  Exp.  12/19       Mild major depression (H) 05/13/2013     Priority: High     Followed by [facility], Dr. Lauren Koch  Still completing EMDR/complex PTSD    Appointment 12/31/13 with [facility] to establish with psychiatrist-DR. Lauren Singh left practice, now -ALESHA, Claudia Castano-private practice in [place]    Followed by psychiatry Dr. Singh, medication for insomnia, patient does not know name of medication, ssri/antidepressants made insomnia worse  Completing EMDR with Dr. Singh       Generalized anxiety disorder 11/16/2009     Priority: High     h/o panic attacks in social settings  Diagnosis updated by automated process. Provider to review and confirm.       Insomnia due to other mental disorder 09/27/2019     Priority: Medium     Tobacco abuse 07/09/2018     Priority: Medium     Alcohol use disorder 02/17/2017     " Priority: Medium     Discharged from [facility] winter 2017.  Worried about relapse early 2018 but issue resolved with  getting alcohol out of house.       Persistent insomnia 05/03/2016     Priority: Medium     Substance abuse (H) 05/03/2016     Priority: Medium     Hypertension 06/08/2015     Priority: Medium     Reported by patient. 06/08/2015       History of loop electrical excision procedure (LEEP) 08/29/2014     Priority: Medium      s/p LEEP (2000) - annual paps for 20 years  8/29/14-NIL, HPV neg, -Endometrial cells present. Patient with mirena in place, no irregular bleeding,PLAN repeat pap with cotesting in 1 year  9/30/15:Pap:NIL/neg HPV.  4/13/17:Pap: NIL/neg HPV.   7/9/18 NIL Pap, Neg HPV. Plan routine screening per ASCCP algorithm. Plan routine screening (cotest in 5 years).        Dissociative disorder 11/30/2013     Priority: Medium     depersonalization       Brachial neuritis or radiculitis 09/16/2013     Priority: Medium     Problem list name updated by automated process. Provider to review          Past Medical History:    Past Medical History:   Diagnosis Date     Abnormal Pap smear 2000     Arthritis      CARDIOVASCULAR SCREENING; LDL GOAL LESS THAN 160      IUD (intrauterine device) in place 5/4/2012       Past Surgical History:    Past Surgical History:   Procedure Laterality Date     COLONOSCOPY N/A 9/17/2018    Procedure: COLONOSCOPY;  colonoscopy;  Surgeon: Roscoe Warren MD;  Location: [facility]     FUSION CERVICAL ANTERIOR ONE LEVEL  9/16/2013    Procedure: FUSION CERVICAL ANTERIOR ONE LEVEL;  ANTERIOR CERVICAL DECOMPRESSION AND FUSION C5-6 WITH INSTRUMENTATION, ALLOGRAFT AND BONE MARROW ASPIRATION OF THE LEFT ILIAC CREST ;  Surgeon: Randy Jameson MD;  Location:  OR     GRAFT BONE FROM ILIAC CREST  9/16/2013    Procedure: GRAFT BONE FROM ILIAC CREST;;  Surgeon: Randy Jameson MD;  Location:  OR     HC COLP CERVIX/UPPER VAGINA W LOOP ELEC BX CERVIX       SURGICAL  HISTORY OF -       Colposcopy with loop electrode excision of the cervix       Family History:    Family History   Problem Relation Age of Onset     Diabetes Maternal Grandmother      Hypertension Maternal Grandmother      Substance Abuse Maternal Grandmother      Diabetes Paternal Grandmother      Substance Abuse Paternal Grandmother      Alcohol/Drug Father         alcohol     Anxiety Disorder Father      Substance Abuse Father      Alcohol/Drug Sister         drugs     Substance Abuse Sister      Depression Sister      Anxiety Disorder Sister      Anxiety Disorder Mother      Substance Abuse Mother      Depression Mother      Substance Abuse Maternal Grandfather      Substance Abuse Paternal Grandfather      Anxiety Disorder Daughter        Social History:  Marital Status:   [2]  Social History     Tobacco Use     Smoking status: Light Tobacco Smoker     Packs/day: 0.50     Types: Cigarettes, Other     Smokeless tobacco: Never Used     Tobacco comment: Does JUUL e cig    Substance Use Topics     Alcohol use: No     Alcohol/week: 0.0 standard drinks     Drug use: No        Medications:    ** PATIENT ALERT **  amitriptyline (ELAVIL) 50 MG tablet  IBUPROFEN PO  levonorgestrel (MIRENA) 20 MCG/24HR IUD  temazepam (RESTORIL) 15 MG capsule          Review of Systems   Constitutional: Negative for fever.   Respiratory: Negative for cough and shortness of breath.    Cardiovascular: Negative for chest pain.   Musculoskeletal: Positive for arthralgias. Negative for back pain and neck pain.   Skin: Negative for wound.   Neurological: Negative for dizziness, light-headedness, numbness and headaches.       Physical Exam   BP: (!) 155/90  Heart Rate: 87  Temp: 98  F (36.7  C)  Resp: 18  Weight: 63.5 kg (140 lb)  SpO2: 99 %      Physical Exam  Constitutional:       General: She is not in acute distress.     Appearance: She is not ill-appearing.   Pulmonary:      Effort: Pulmonary effort is normal.      Breath sounds:  Normal breath sounds.   Musculoskeletal:      Left shoulder: She exhibits decreased range of motion (due to pain), tenderness (diffuse), bony tenderness (diffuse), pain and decreased strength (decreased shoulder, arm and  strength secondary to pain). She exhibits no swelling, no deformity, no laceration and normal pulse.      Left elbow: Normal.      Left wrist: Normal.   Neurological:      Mental Status: She is alert.         ED Course        Procedures    Results for orders placed or performed during the hospital encounter of 01/16/20 (from the past 24 hour(s))   XR Shoulder Left 2 Views    Narrative    LEFT SHOULDER TWO VIEWS   1/16/2020 3:10 PM    HISTORY: Fall with pain and very limited ROM (range of motion).    COMPARISON: None.      Impression    IMPRESSION: There is a nondisplaced mildly comminuted fracture of the  proximal humerus. This includes a transverse fracture through the  surgical neck with the greater tuberosity a separate fragment. No  other abnormality is seen.     YUN CHAVIRA MD       Medications   oxyCODONE-acetaminophen (PERCOCET) 5-325 MG per tablet 1 tablet (1 tablet Oral Given 1/16/20 1500)       Assessments & Plan (with Medical Decision Making)   Patient is a 49-year-old female who presents the urgent care for evaluation of left shoulder pain.  Imaging obtained revealing a nondisplaced mildly comminuted fracture of the proximal humerus.  This includes a transverse fracture through the surgical neck with greater tuberosity a separate fragment.  Phoned [facility] for additional recommendation who agrees with the plan of care of slinging the left arm and following up with the Ortho group.  Offered patient prescription pain management however she would like to utilize over-the-counter medications solely.  Return precautions reviewed, all questions answered.  Patient discharged in stable condition.    I have reviewed the nursing notes.    I have reviewed the findings,  diagnosis, plan and need for follow up with the patient.    Discharge Medication List as of 1/16/2020  4:01 PM          Final diagnoses:   Proximal humerus fracture       1/16/2020   [facility] EMERGENCY DEPARTMENT     Paty De Santiago, MONIQUE CNP  01/16/20 1640

## 2020-01-16 NOTE — TELEPHONE ENCOUNTER
Reason for call:  Patient reporting a symptom    Symptom or request: Dislocated Left shoulder. Pt fell! Pt is wondering if she should go to the ER. This happened 5 min ago.     Phone Number patient can be reached at:  Home number on file [phone] (home)    Best Time:  Any Time      Can we leave a detailed message on this number:  YES    Call taken on 1/16/2020 at 1:57 PM by Gia De Los Santos

## 2020-01-16 NOTE — ED AVS SNAPSHOT
[facility] Emergency Department  [address] [address]  Phone:  [phone]  Fax:  [phone]                                    Karishma Kimball   MRN: [id number]    Department:  [facility] Emergency Department   Date of Visit:  1/16/2020           After Visit Summary Signature Page    I have received my discharge instructions, and my questions have been answered. I have discussed any challenges I see with this plan with the nurse or doctor.    ..........................................................................................................................................  Patient/Patient Representative Signature      ..........................................................................................................................................  Patient Representative Print Name and Relationship to Patient    ..................................................               ................................................  Date                                   Time    ..........................................................................................................................................  Reviewed by Signature/Title    ...................................................              ..............................................  Date                                               Time          22EPIC Rev 08/18

## 2020-01-21 ENCOUNTER — TRANSFERRED RECORDS (OUTPATIENT)
Dept: HEALTH INFORMATION MANAGEMENT | Facility: CLINIC | Age: 50
End: 2020-01-21

## 2020-02-25 ENCOUNTER — TRANSFERRED RECORDS (OUTPATIENT)
Dept: HEALTH INFORMATION MANAGEMENT | Facility: CLINIC | Age: 50
End: 2020-02-25

## 2020-02-25 DIAGNOSIS — G47.00 PERSISTENT INSOMNIA: Chronic | ICD-10-CM

## 2020-02-25 DIAGNOSIS — F33.0 MAJOR DEPRESSIVE DISORDER, RECURRENT EPISODE, MILD (H): Chronic | ICD-10-CM

## 2020-02-25 DIAGNOSIS — F41.1 GENERALIZED ANXIETY DISORDER: Chronic | ICD-10-CM

## 2020-02-25 NOTE — TELEPHONE ENCOUNTER
Pending Prescriptions:                       Disp   Refills    temazepam (RESTORIL) 15 MG capsule        60 cap*5            Sig: Take 1-2 capsules by mouth 15-30 minutes before           bed.  Start with 1 capsule, ok to increase to 2           capsules if residual insomnia after 3-4 days.    Last Written Prescription Date:  7/25/19  Last Fill Quantity: 60,   # refills: 5  Last Office Visit with [facility], [facility] or [facility] prescribing provider: 9/27/19  Future Office visit:   9/28/20      JAGJIT Yo

## 2020-02-27 RX ORDER — TEMAZEPAM 15 MG/1
CAPSULE ORAL
Qty: 60 CAPSULE | Refills: 5 | Status: SHIPPED | OUTPATIENT
Start: 2020-02-27 | End: 2020-03-02

## 2020-02-28 ENCOUNTER — TELEPHONE (OUTPATIENT)
Dept: SLEEP MEDICINE | Facility: CLINIC | Age: 50
End: 2020-02-28

## 2020-02-28 DIAGNOSIS — F41.1 GENERALIZED ANXIETY DISORDER: Chronic | ICD-10-CM

## 2020-02-28 DIAGNOSIS — G47.00 PERSISTENT INSOMNIA: Chronic | ICD-10-CM

## 2020-02-28 DIAGNOSIS — F33.0 MAJOR DEPRESSIVE DISORDER, RECURRENT EPISODE, MILD (H): Chronic | ICD-10-CM

## 2020-02-28 NOTE — TELEPHONE ENCOUNTER
Reason for call:  Medication   If this is a refill request, has the caller requested the refill from the pharmacy already? Yes  Will the patient be using a [facility]? Yes  Name of the pharmacy and phone number for the current request: [facility]    Name of the medication requested: Temazepam 15 MG    Other request: n/a    Phone number to reach patient:  Cell number on file:    Telephone Information:   Mobile [phone]       Best Time:  anytime    Can we leave a detailed message on this number?  YES    Travel screening: Not Applicable

## 2020-03-02 ENCOUNTER — TELEPHONE (OUTPATIENT)
Dept: SCHEDULING | Facility: CLINIC | Age: 50
End: 2020-03-02

## 2020-03-02 RX ORDER — TEMAZEPAM 15 MG/1
CAPSULE ORAL
Qty: 60 CAPSULE | Refills: 5 | Status: SHIPPED | OUTPATIENT
Start: 2020-03-02 | End: 2020-04-03

## 2020-03-02 NOTE — TELEPHONE ENCOUNTER
I looked in her chart and both the temazepam and amitriptyline have refills in place.  Is there a different medication in need of refilling?

## 2020-03-02 NOTE — TELEPHONE ENCOUNTER
Reason for call:  Other   Patient called regarding (reason for call): prescription  Additional comments: Patient is requesting a prescription refill that was due in January.    Phone number to reach patient:  Cell number on file:    Telephone Information:   Mobile [phone]       Best Time:  Any time    Can we leave a detailed message on this number?  YES    Travel screening: Not Applicable

## 2020-03-03 NOTE — TELEPHONE ENCOUNTER
Patient called back and left a voicemail said that the pharmacy always says that they don't have refills even though she know they are there. Asked us to look into it.    Spoke to Kay at the pharmcy and was told that the medication is ready for  and that she would call and let the patient know.      Aretha Moody [facility]

## 2020-03-03 NOTE — TELEPHONE ENCOUNTER
Called patient to clarify what medications she is needing refilled as there are refills left on the medications that Dr. Blake has prescribed.    Aretha Moody [facility] [place] ~[place]

## 2020-04-01 DIAGNOSIS — F51.01 IDIOPATHIC INSOMNIA: ICD-10-CM

## 2020-04-01 NOTE — TELEPHONE ENCOUNTER
amitriptyline (ELAVIL) 50 MG tablet     Last Written Prescription Date:  2/15/19  Last Fill Quantity: 90,   # refills: 3  Last Office Visit: 9/27/19  Future Office visit:       Routing refill request to provider for review/approval because:  Drug not on the [facility], [facility] or [facility] refill protocol or controlled substance

## 2020-04-02 RX ORDER — AMITRIPTYLINE HYDROCHLORIDE 50 MG/1
TABLET ORAL
Qty: 90 TABLET | Refills: 3 | Status: SHIPPED | OUTPATIENT
Start: 2020-04-02 | End: 2020-09-21

## 2020-04-03 DIAGNOSIS — G47.00 PERSISTENT INSOMNIA: Chronic | ICD-10-CM

## 2020-04-03 DIAGNOSIS — F41.1 GENERALIZED ANXIETY DISORDER: Chronic | ICD-10-CM

## 2020-04-03 DIAGNOSIS — F33.0 MAJOR DEPRESSIVE DISORDER, RECURRENT EPISODE, MILD (H): Chronic | ICD-10-CM

## 2020-04-03 RX ORDER — TEMAZEPAM 15 MG/1
CAPSULE ORAL
Qty: 60 CAPSULE | Refills: 5 | Status: SHIPPED | OUTPATIENT
Start: 2020-04-03 | End: 2020-09-28

## 2020-04-14 ENCOUNTER — TRANSFERRED RECORDS (OUTPATIENT)
Dept: HEALTH INFORMATION MANAGEMENT | Facility: CLINIC | Age: 50
End: 2020-04-14

## 2020-04-16 ENCOUNTER — HOSPITAL ENCOUNTER (OUTPATIENT)
Dept: PHYSICAL THERAPY | Facility: CLINIC | Age: 50
Setting detail: THERAPIES SERIES
End: 2020-04-16
Attending: ORTHOPAEDIC SURGERY
Payer: COMMERCIAL

## 2020-04-16 PROCEDURE — 97110 THERAPEUTIC EXERCISES: CPT | Mod: GP | Performed by: PHYSICAL THERAPIST

## 2020-04-16 PROCEDURE — 97140 MANUAL THERAPY 1/> REGIONS: CPT | Mod: GP | Performed by: PHYSICAL THERAPIST

## 2020-04-16 PROCEDURE — 97161 PT EVAL LOW COMPLEX 20 MIN: CPT | Mod: GP | Performed by: PHYSICAL THERAPIST
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
## 2020-04-21 ENCOUNTER — HOSPITAL ENCOUNTER (OUTPATIENT)
Dept: PHYSICAL THERAPY | Facility: CLINIC | Age: 50
Setting detail: THERAPIES SERIES
End: 2020-04-21
Attending: ORTHOPAEDIC SURGERY
Payer: COMMERCIAL

## 2020-04-21 PROCEDURE — 97110 THERAPEUTIC EXERCISES: CPT | Mod: GP | Performed by: PHYSICAL THERAPIST

## 2020-04-28 ENCOUNTER — HOSPITAL ENCOUNTER (OUTPATIENT)
Dept: PHYSICAL THERAPY | Facility: CLINIC | Age: 50
Setting detail: THERAPIES SERIES
End: 2020-04-28
Attending: ORTHOPAEDIC SURGERY
Payer: COMMERCIAL

## 2020-04-28 PROCEDURE — 97110 THERAPEUTIC EXERCISES: CPT | Mod: GP | Performed by: PHYSICAL THERAPIST

## 2020-04-28 PROCEDURE — 97140 MANUAL THERAPY 1/> REGIONS: CPT | Mod: GP | Performed by: PHYSICAL THERAPIST

## 2020-05-05 ENCOUNTER — HOSPITAL ENCOUNTER (OUTPATIENT)
Dept: PHYSICAL THERAPY | Facility: CLINIC | Age: 50
Setting detail: THERAPIES SERIES
End: 2020-05-05
Attending: ORTHOPAEDIC SURGERY
Payer: COMMERCIAL

## 2020-05-05 PROCEDURE — 97110 THERAPEUTIC EXERCISES: CPT | Mod: GP | Performed by: PHYSICAL THERAPIST

## 2020-05-05 PROCEDURE — 97140 MANUAL THERAPY 1/> REGIONS: CPT | Mod: GP | Performed by: PHYSICAL THERAPIST

## 2020-05-12 ENCOUNTER — HOSPITAL ENCOUNTER (OUTPATIENT)
Dept: PHYSICAL THERAPY | Facility: CLINIC | Age: 50
Setting detail: THERAPIES SERIES
End: 2020-05-12
Attending: ORTHOPAEDIC SURGERY
Payer: COMMERCIAL

## 2020-05-12 PROCEDURE — 97110 THERAPEUTIC EXERCISES: CPT | Mod: GP | Performed by: PHYSICAL THERAPIST

## 2020-05-19 ENCOUNTER — HOSPITAL ENCOUNTER (OUTPATIENT)
Dept: PHYSICAL THERAPY | Facility: CLINIC | Age: 50
Setting detail: THERAPIES SERIES
End: 2020-05-19
Attending: ORTHOPAEDIC SURGERY
Payer: COMMERCIAL

## 2020-05-19 PROCEDURE — 97110 THERAPEUTIC EXERCISES: CPT | Mod: GP | Performed by: PHYSICAL THERAPIST

## 2020-06-03 ENCOUNTER — HOSPITAL ENCOUNTER (OUTPATIENT)
Dept: PHYSICAL THERAPY | Facility: CLINIC | Age: 50
Setting detail: THERAPIES SERIES
End: 2020-06-03
Attending: ORTHOPAEDIC SURGERY
Payer: COMMERCIAL

## 2020-06-03 PROCEDURE — 97110 THERAPEUTIC EXERCISES: CPT | Mod: GP,GT | Performed by: PHYSICAL THERAPIST

## 2020-06-03 NOTE — PROGRESS NOTES
Karishma Kimball is a 49 year old female who is being seen via a billable video visit.      Patient has given verbal consent for Video visit? Yes    Video Start Time: 11:15am    Telehealth Visit Details    Type of Service:  Telehealth    Video End Time (time video stopped): 11:38 pm    Originating Location (pt. location): Home    Additional Participants in Telehealth Visit: NA    Distant Location (provider location):  [facility] PHYSICAL THERAPY     Mode of Communication (Audio Visual or Audio Only):  audio and visual     Ramandeep Morgan, PT  Alice 3, 2020

## 2020-06-17 ENCOUNTER — HOSPITAL ENCOUNTER (OUTPATIENT)
Dept: PHYSICAL THERAPY | Facility: CLINIC | Age: 50
Setting detail: THERAPIES SERIES
End: 2020-06-17
Attending: ORTHOPAEDIC SURGERY
Payer: COMMERCIAL

## 2020-06-17 PROCEDURE — 97110 THERAPEUTIC EXERCISES: CPT | Mod: GP | Performed by: PHYSICAL THERAPIST

## 2020-06-17 NOTE — PROGRESS NOTES
"Outpatient Physical Therapy Discharge Note     Patient: Karishma Kimball  : 1970    Beginning/End Dates of Reporting Period:  20 to 2020    Referring Provider: Randy Carranza MD    Therapy Diagnosis: L shoulder pain     Client Self Report: Pt relates she is doing pilates. She is able to do everything she needs too.     Objective Measurements:  Objective Measure: L Shoulder PROM  Details: NT    Objective Measure: AROM:  Details: flex:160 abd:140 IR: T10  ER: C7    Objective Measure: Pain L shoulder     Objective Measure: L shoulder MMT  Details: flex:4/5  abd3/5: IR: 5/5 ER:3/5                   Outcome Measures (most recent score):  SPADI: 4%     Goals:  Goal Identifier '   Goal Description 1)Pt will improve ROM in (L) UE to wash hair with ease in 3 weeks.    Target Date 20   Date Met  20   Progress:goal met     Goal Identifier STG   Goal Description 2)Pt will report 2/10 or less pain with ADL's at or above shoulder height in 4 weeks.   Target Date 20   Date Met  20   Progress:goal met     Goal Identifier LTG   Goal Description 3)Pt will report no pain using (L) UE for HH chores in 8 weeks.    Target Date 20   Date Met  20   Progress:goal met     Goal Identifier LTG   Goal Description 4) Pt will be indep in HEP to prevent \"freezing\" or return of symptoms in (L) UE, in 8 weeks.    Target Date 20   Date Met  20   Progress:goal met     Progress Toward Goals:   Progress this reporting period: Pt has been seen for 8 visits over this POC. Pt demonstrates significant gains in ROM and relates can complete all functional tasks at home. Limitations are noted with strength and end range AROM however pt is independent with HEP and would like to continue at home thus pt is being discharged at this time.           Plan:  Discharge from therapy.    Discharge:    Reason for Discharge: No further expectation of progress.  Patient chooses to discontinue " therapy.    Equipment Issued: NA    Discharge Plan: Patient to continue home program.

## 2020-09-28 ENCOUNTER — VIRTUAL VISIT (OUTPATIENT)
Dept: SLEEP MEDICINE | Facility: CLINIC | Age: 50
End: 2020-09-28
Payer: COMMERCIAL

## 2020-09-28 VITALS — WEIGHT: 150 LBS | BODY MASS INDEX: 25.61 KG/M2 | HEIGHT: 64 IN

## 2020-09-28 DIAGNOSIS — G47.00 PERSISTENT INSOMNIA: Chronic | ICD-10-CM

## 2020-09-28 DIAGNOSIS — F33.0 MAJOR DEPRESSIVE DISORDER, RECURRENT EPISODE, MILD (H): Chronic | ICD-10-CM

## 2020-09-28 DIAGNOSIS — F41.1 GENERALIZED ANXIETY DISORDER: Chronic | ICD-10-CM

## 2020-09-28 PROCEDURE — 99214 OFFICE O/P EST MOD 30 MIN: CPT | Mod: 95 | Performed by: FAMILY MEDICINE

## 2020-09-28 RX ORDER — TEMAZEPAM 15 MG/1
CAPSULE ORAL
Qty: 60 CAPSULE | Refills: 5 | Status: SHIPPED | OUTPATIENT
Start: 2020-09-28 | End: 2021-04-01

## 2020-09-28 ASSESSMENT — MIFFLIN-ST. JEOR: SCORE: 1275.4

## 2020-09-28 NOTE — PATIENT INSTRUCTIONS
Your BMI is Body mass index is 25.75 kg/m .  Weight management is a personal decision.  If you are interested in exploring weight loss strategies, the following discussion covers the approaches that may be successful. Body mass index (BMI) is one way to tell whether you are at a healthy weight, overweight, or obese. It measures your weight in relation to your height.  A BMI of 18.5 to 24.9 is in the healthy range. A person with a BMI of 25 to 29.9 is considered overweight, and someone with a BMI of 30 or greater is considered obese. More than two-thirds of American adults are considered overweight or obese.  Being overweight or obese increases the risk for further weight gain. Excess weight may lead to heart disease and diabetes.  Creating and following plans for healthy eating and physical activity may help you improve your health.  Weight control is part of healthy lifestyle and includes exercise, emotional health, and healthy eating habits. Careful eating habits lifelong are the mainstay of weight control. Though there are significant health benefits from weight loss, long-term weight loss with diet alone may be very difficult to achieve- studies show long-term success with dietary management in less than 10% of people. Attaining a healthy weight may be especially difficult to achieve in those with severe obesity. In some cases, medications, devices and surgical management might be considered.  What can you do?  If you are overweight or obese and are interested in methods for weight loss, you should discuss this with your provider.     Consider reducing daily calorie intake by 500 calories.     Keep a food journal.     Avoiding skipping meals, consider cutting portions instead.    Diet combined with exercise helps maintain muscle while optimizing fat loss. Strength training is particularly important for building and maintaining muscle mass. Exercise helps reduce stress, increase energy, and improves fitness.  Increasing exercise without diet control, however, may not burn enough calories to loose weight.       Start walking three days a week 10-20 minutes at a time    Work towards walking thirty minutes five days a week     Eventually, increase the speed of your walking for 1-2 minutes at time    In addition, we recommend that you review healthy lifestyles and methods for weight loss available through the National Institutes of Health patient information sites:  http://win.niddk.nih.gov/publications/index.htm    And look into health and wellness programs that may be available through your health insurance provider, employer, local community center, or sury club.    Weight management plan: Patient was referred to their PCP to discuss a diet and exercise plan.
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
## 2021-03-15 ENCOUNTER — OFFICE VISIT (OUTPATIENT)
Dept: FAMILY MEDICINE | Facility: CLINIC | Age: 51
End: 2021-03-15
Payer: COMMERCIAL

## 2021-03-15 VITALS
OXYGEN SATURATION: 97 % | TEMPERATURE: 98.5 F | RESPIRATION RATE: 14 BRPM | BODY MASS INDEX: 27.49 KG/M2 | DIASTOLIC BLOOD PRESSURE: 89 MMHG | HEART RATE: 100 BPM | WEIGHT: 161 LBS | SYSTOLIC BLOOD PRESSURE: 136 MMHG | HEIGHT: 64 IN

## 2021-03-15 DIAGNOSIS — F17.290 NICOTINE DEPENDENCE DUE TO VAPING TOBACCO PRODUCT: ICD-10-CM

## 2021-03-15 DIAGNOSIS — Z12.31 ENCOUNTER FOR SCREENING MAMMOGRAM FOR BREAST CANCER: ICD-10-CM

## 2021-03-15 DIAGNOSIS — F10.21 ALCOHOL DEPENDENCE IN REMISSION (H): ICD-10-CM

## 2021-03-15 DIAGNOSIS — R14.0 BLOATING: Primary | ICD-10-CM

## 2021-03-15 DIAGNOSIS — F32.5 DEPRESSION, MAJOR, IN REMISSION (H): ICD-10-CM

## 2021-03-15 LAB — TSH SERPL DL<=0.005 MIU/L-ACNC: 1.13 MU/L (ref 0.4–4)

## 2021-03-15 PROCEDURE — 36415 COLL VENOUS BLD VENIPUNCTURE: CPT | Performed by: PHYSICIAN ASSISTANT

## 2021-03-15 PROCEDURE — 83516 IMMUNOASSAY NONANTIBODY: CPT | Mod: 59 | Performed by: PHYSICIAN ASSISTANT

## 2021-03-15 PROCEDURE — 83516 IMMUNOASSAY NONANTIBODY: CPT | Performed by: PHYSICIAN ASSISTANT

## 2021-03-15 PROCEDURE — 99214 OFFICE O/P EST MOD 30 MIN: CPT | Performed by: PHYSICIAN ASSISTANT

## 2021-03-15 PROCEDURE — 84443 ASSAY THYROID STIM HORMONE: CPT | Performed by: PHYSICIAN ASSISTANT

## 2021-03-15 PROCEDURE — 82784 ASSAY IGA/IGD/IGG/IGM EACH: CPT | Performed by: PHYSICIAN ASSISTANT

## 2021-03-15 ASSESSMENT — ANXIETY QUESTIONNAIRES
2. NOT BEING ABLE TO STOP OR CONTROL WORRYING: NOT AT ALL
3. WORRYING TOO MUCH ABOUT DIFFERENT THINGS: NOT AT ALL
GAD7 TOTAL SCORE: 2
GAD7 TOTAL SCORE: 2
7. FEELING AFRAID AS IF SOMETHING AWFUL MIGHT HAPPEN: NOT AT ALL
1. FEELING NERVOUS, ANXIOUS, OR ON EDGE: SEVERAL DAYS
5. BEING SO RESTLESS THAT IT IS HARD TO SIT STILL: NOT AT ALL
6. BECOMING EASILY ANNOYED OR IRRITABLE: SEVERAL DAYS
7. FEELING AFRAID AS IF SOMETHING AWFUL MIGHT HAPPEN: NOT AT ALL
4. TROUBLE RELAXING: NOT AT ALL
GAD7 TOTAL SCORE: 2

## 2021-03-15 ASSESSMENT — PATIENT HEALTH QUESTIONNAIRE - PHQ9
SUM OF ALL RESPONSES TO PHQ QUESTIONS 1-9: 0
SUM OF ALL RESPONSES TO PHQ QUESTIONS 1-9: 0
10. IF YOU CHECKED OFF ANY PROBLEMS, HOW DIFFICULT HAVE THESE PROBLEMS MADE IT FOR YOU TO DO YOUR WORK, TAKE CARE OF THINGS AT HOME, OR GET ALONG WITH OTHER PEOPLE: NOT DIFFICULT AT ALL

## 2021-03-15 ASSESSMENT — MIFFLIN-ST. JEOR: SCORE: 1325.29

## 2021-03-15 NOTE — NURSING NOTE
"Chief Complaint   Patient presents with     Bloated       Initial /89 (BP Location: Right arm, Patient Position: Chair, Cuff Size: Adult Regular)   Pulse 100   Temp 98.5  F (36.9  C) (Tympanic)   Resp 14   Ht 1.626 m (5' 4\")   Wt 73 kg (161 lb)   SpO2 97%   BMI 27.64 kg/m   Estimated body mass index is 27.64 kg/m  as calculated from the following:    Height as of this encounter: 1.626 m (5' 4\").    Weight as of this encounter: 73 kg (161 lb).    Patient presents to the clinic using No DME    Health Maintenance that is potentially due pending provider review:  NONE        Is there anyone who you would like to be able to receive your results? No  If yes have patient fill out MACEY      "

## 2021-03-15 NOTE — PATIENT INSTRUCTIONS
Labs   [place] more with the FODMAPS  Probiotic - kefir, Floragen, etc - more than 1 strain  Fiber - work up to 1-2 teaspoons or 5 capsules or so.    GI referral for consideration of bacterial overgrowth     Working on quitting vaping    [facility] ~ [phone]  One Day Weekly- Alternating Days    [place] ~ [phone]  Every other Monday or Wednesday   & one Saturday morning a month    [place] ~ [phone]  Every Other Monday Afternoon      [place] ~ [phone]  Every Monday morning  Every Tuesday afternoon  Wed, Thurs, Friday morning & afternoon

## 2021-03-15 NOTE — PROGRESS NOTES
Assessment & Plan     Bloating  - Tissue transglutaminase ana IgA and IgG  - IgA  - TSH with free T4 reflex  - GASTROENTEROLOGY ADULT REF CONSULT ONLY; Future    Nicotine dependence due to vaping tobacco product  - nicotine (NICORETTE) 4 MG lozenge; Place 1 lozenge (4 mg) inside cheek as needed for smoking cessation  - QUIT PARTNER (TOBACCO CESSATION) REFERRAL    Depression, major, in remission (H)  Alcohol dependence in remission (H)  Doing well    Encounter for screening mammogram for breast cancer  - *MA Screening Digital Bilateral; Future    Patient Instructions   Labs   [place] more with the FODMAPS  Probiotic - kefir, Floragen, etc - more than 1 strain  Fiber - work up to 1-2 teaspoons or 5 capsules or so.    GI referral for consideration of bacterial overgrowth     Working on quitting vaping    [facility] Mammo Schedule  [facility] ~ [phone]  One Day Weekly- Alternating Days    [place] ~ [phone]  Every other Monday or Wednesday   & one Saturday morning a month    [place] ~ [phone]  Every Other Monday Afternoon      [place] ~ [phone]  Every Monday morning  Every Tuesday afternoon  Wed, Thurs, Friday morning & afternoon        Return if symptoms worsen or fail to improve.    Dara Pope PA-C  [facility]    [place]   Karishma is a 50 year old who presents for the following health issues    HPI     Dieting/exercising for 2 years, still around the same weight, can't seem to lose.    Concern - Bloating  Onset: 1 year  Description: Bloating every day, no pain. Has tried changing diet to see if it would help, but it doesn't  Intensity: moderate  Precipitating factors:        Worsened by: No particular food/drink  Alleviating factors:        Improved by: Nothing  Therapies tried and outcome: Metamucil, Gas-X, diurex, bean-o, clear-lax, pepto bismol.  Nothing helped    Bloating x several therapies   90 min per day exercise since [place]  Worsens as  "day goes on.  Cut carbonation, coffee, fast food, junk food, beans, dairy, vegan.  Just started FOPDMAP. Care lite therapy.  Colonoscopy 2018.        Objective    /89 (BP Location: Right arm, Patient Position: Chair, Cuff Size: Adult Regular)   Pulse 100   Temp 98.5  F (36.9  C) (Tympanic)   Resp 14   Ht 1.626 m (5' 4\")   Wt 73 kg (161 lb)   SpO2 97%   BMI 27.64 kg/m    Body mass index is 27.64 kg/m .  Physical Exam   GENERAL: healthy, alert and no distress  PSYCH: mentation appears normal, affect normal/bright        "

## 2021-03-16 LAB
IGA SERPL-MCNC: 165 MG/DL (ref 84–499)
TTG IGA SER-ACNC: 1 U/ML
TTG IGG SER-ACNC: <1 U/ML

## 2021-03-16 ASSESSMENT — PATIENT HEALTH QUESTIONNAIRE - PHQ9: SUM OF ALL RESPONSES TO PHQ QUESTIONS 1-9: 0

## 2021-03-16 ASSESSMENT — ANXIETY QUESTIONNAIRES: GAD7 TOTAL SCORE: 2

## 2021-03-17 NOTE — RESULT ENCOUNTER NOTE
Please place any/all future orders discussed below.    Please notify patient - Lab results are normal.  Continue plan discussed at your appointment.

## 2021-03-18 ENCOUNTER — TELEPHONE (OUTPATIENT)
Dept: FAMILY MEDICINE | Facility: CLINIC | Age: 51
End: 2021-03-18

## 2021-03-18 DIAGNOSIS — F51.01 IDIOPATHIC INSOMNIA: ICD-10-CM

## 2021-03-18 RX ORDER — AMITRIPTYLINE HYDROCHLORIDE 50 MG/1
TABLET ORAL
Qty: 90 TABLET | Refills: 5 | Status: SHIPPED | OUTPATIENT
Start: 2021-03-18 | End: 2021-11-18

## 2021-03-18 NOTE — TELEPHONE ENCOUNTER
Reason for Call: Pt has a consult for gastro and pt has called different places and they are saying it needs to say imaging not consult they want her to go to imaging first then see a doc.      Who is the form from?: Home care    Where did the form come from: form was faxed in to [facility]    What clinic location was the form placed at?: [place]    Where the form was placed: Given to physician           Call taken on 3/18/2021 at 3:14 PM by Gia Choudhary

## 2021-03-18 NOTE — TELEPHONE ENCOUNTER
Pended RX Amitriptyline (Elavil) 50 mg tabs, DISP:  90 tabs, SIG:  Take   3 tablets by mouth at bedtime, R^5  Last Written Prescription Date:  9/21/2020  Last Fill Quantity:90   # refills: 5  Last Office Visit: 9/28/2020  Future Office visit:     Routed to Dr. Alan BAUTISTA .  Dr. Blake not available  Routing refill request to provider for review/approval because:  Drug not on the [facility], P or [facility] refill protocol or controlled substance

## 2021-03-19 ENCOUNTER — TELEPHONE (OUTPATIENT)
Dept: FAMILY MEDICINE | Facility: CLINIC | Age: 51
End: 2021-03-19

## 2021-03-19 NOTE — TELEPHONE ENCOUNTER
Reason for call: ROB Pope PA-C      Symptom or request: Gastroenterologist Tika Traylor Appt  5/10/21    Phone Number patient can be reached at:  Home number on file [phone] (home)    Best Time:  Any Time      Can we leave a detailed message on this number:  YES    Call taken on 3/19/2021 at 11:08 AM by Gia De Los Santos

## 2021-03-31 ENCOUNTER — OFFICE VISIT (OUTPATIENT)
Dept: FAMILY MEDICINE | Facility: CLINIC | Age: 51
End: 2021-03-31
Payer: COMMERCIAL

## 2021-03-31 VITALS
OXYGEN SATURATION: 98 % | TEMPERATURE: 97.8 F | BODY MASS INDEX: 28.17 KG/M2 | HEART RATE: 98 BPM | SYSTOLIC BLOOD PRESSURE: 138 MMHG | HEIGHT: 64 IN | DIASTOLIC BLOOD PRESSURE: 88 MMHG | WEIGHT: 165 LBS | RESPIRATION RATE: 20 BRPM

## 2021-03-31 DIAGNOSIS — R19.07 ABDOMINAL SWELLING, GENERALIZED: ICD-10-CM

## 2021-03-31 DIAGNOSIS — Z00.00 ROUTINE GENERAL MEDICAL EXAMINATION AT A HEALTH CARE FACILITY: Primary | ICD-10-CM

## 2021-03-31 LAB
ALBUMIN SERPL-MCNC: 3.8 G/DL (ref 3.4–5)
ALP SERPL-CCNC: 80 U/L (ref 40–150)
ALT SERPL W P-5'-P-CCNC: 85 U/L (ref 0–50)
AMYLASE SERPL-CCNC: 62 U/L (ref 30–110)
ANION GAP SERPL CALCULATED.3IONS-SCNC: 4 MMOL/L (ref 3–14)
AST SERPL W P-5'-P-CCNC: 39 U/L (ref 0–45)
BILIRUB SERPL-MCNC: 0.3 MG/DL (ref 0.2–1.3)
BUN SERPL-MCNC: 24 MG/DL (ref 7–30)
CALCIUM SERPL-MCNC: 8.9 MG/DL (ref 8.5–10.1)
CHLORIDE SERPL-SCNC: 104 MMOL/L (ref 94–109)
CO2 SERPL-SCNC: 31 MMOL/L (ref 20–32)
CREAT SERPL-MCNC: 0.81 MG/DL (ref 0.52–1.04)
GFR SERPL CREATININE-BSD FRML MDRD: 83 ML/MIN/{1.73_M2}
GLUCOSE SERPL-MCNC: 104 MG/DL (ref 70–99)
LIPASE SERPL-CCNC: 169 U/L (ref 73–393)
POTASSIUM SERPL-SCNC: 4.6 MMOL/L (ref 3.4–5.3)
PROT SERPL-MCNC: 7.6 G/DL (ref 6.8–8.8)
SODIUM SERPL-SCNC: 139 MMOL/L (ref 133–144)

## 2021-03-31 PROCEDURE — 99396 PREV VISIT EST AGE 40-64: CPT | Performed by: NURSE PRACTITIONER

## 2021-03-31 PROCEDURE — 80053 COMPREHEN METABOLIC PANEL: CPT | Performed by: NURSE PRACTITIONER

## 2021-03-31 PROCEDURE — 36415 COLL VENOUS BLD VENIPUNCTURE: CPT | Performed by: NURSE PRACTITIONER

## 2021-03-31 PROCEDURE — 83690 ASSAY OF LIPASE: CPT | Performed by: NURSE PRACTITIONER

## 2021-03-31 PROCEDURE — 82150 ASSAY OF AMYLASE: CPT | Performed by: NURSE PRACTITIONER

## 2021-03-31 ASSESSMENT — ENCOUNTER SYMPTOMS
HEARTBURN: 1
MYALGIAS: 1
CHILLS: 0
HEADACHES: 1
ABDOMINAL PAIN: 0
FEVER: 0
FREQUENCY: 0
NERVOUS/ANXIOUS: 1
DIARRHEA: 0
HEMATURIA: 0
DIZZINESS: 1
JOINT SWELLING: 0
CONSTIPATION: 0
EYE PAIN: 0
ARTHRALGIAS: 0
HEMATOCHEZIA: 0
COUGH: 0

## 2021-03-31 ASSESSMENT — MIFFLIN-ST. JEOR: SCORE: 1339.47

## 2021-03-31 NOTE — NURSING NOTE
"Chief Complaint   Patient presents with     Physical       Initial /88   Pulse 98   Temp 97.8  F (36.6  C) (Tympanic)   Resp 20   Ht 1.619 m (5' 3.75\")   Wt 74.8 kg (165 lb)   SpO2 98%   BMI 28.54 kg/m   Estimated body mass index is 28.54 kg/m  as calculated from the following:    Height as of this encounter: 1.619 m (5' 3.75\").    Weight as of this encounter: 74.8 kg (165 lb).    Patient presents to the clinic using No DME    Health Maintenance that is potentially due pending provider review:  Mammogram    Pt will schedule mammogram appt. After gastroenterology tests     Is there anyone who you would like to be able to receive your results? No  If yes have patient fill out MACEY    "

## 2021-03-31 NOTE — PATIENT INSTRUCTIONS
Labs today-we will notify you with those results    Follow up with GI as planned    Preventive Health Recommendations  Female Ages 50 - 64    Yearly exam: See your health care provider every year in order to  o Review health changes.   o Discuss preventive care.    o Review your medicines if your doctor has prescribed any.      Get a Pap test every three years (unless you have an abnormal result and your provider advises testing more often).    If you get Pap tests with HPV test, you only need to test every 5 years, unless you have an abnormal result.     You do not need a Pap test if your uterus was removed (hysterectomy) and you have not had cancer.    You should be tested each year for STDs (sexually transmitted diseases) if you're at risk.     Have a mammogram every 1 to 2 years.    Have a colonoscopy at age 50, or have a yearly FIT test (stool test). These exams screen for colon cancer.      Have a cholesterol test every 5 years, or more often if advised.    Have a diabetes test (fasting glucose) every three years. If you are at risk for diabetes, you should have this test more often.     If you are at risk for osteoporosis (brittle bone disease), think about having a bone density scan (DEXA).    Shots: Get a flu shot each year. Get a tetanus shot every 10 years.    Nutrition:     Eat at least 5 servings of fruits and vegetables each day.    Eat whole-grain bread, whole-wheat pasta and brown rice instead of white grains and rice.    Get adequate Calcium and Vitamin D.     Lifestyle    Exercise at least 150 minutes a week (30 minutes a day, 5 days a week). This will help you control your weight and prevent disease.    Limit alcohol to one drink per day.    No smoking.     Wear sunscreen to prevent skin cancer.     See your dentist every six months for an exam and cleaning.    See your eye doctor every 1 to 2 years.

## 2021-03-31 NOTE — PROGRESS NOTES
SUBJECTIVE:   CC: Karishma Kimball is an 50 year old woman who presents for preventive health visit.     Patient has been advised of split billing requirements and indicates understanding: Yes  Healthy Habits:     Getting at least 3 servings of Calcium per day:  Yes    Bi-annual eye exam:  Yes    Dental care twice a year:  NO    Sleep apnea or symptoms of sleep apnea:  None    Diet:  Regular (no restrictions)    Frequency of exercise:  6-7 days/week    Duration of exercise:  30-45 minutes    Taking medications regularly:  Yes    Medication side effects:  None    PHQ-2 Total Score: 0    Additional concerns today:  No        HPI:  50 year old female in no acute distress with history of alcohol use disorder treated in 2017 with no recent use and history of elevated liver enzymes which showed marked improvement on 5/3/16 presents with complaints of a four month onset of increasingly distended abdomen despite modification of diet and increased exercise and associated breast fullness with no tenderness. She was seen by Dara Pope on 3/15/21 for abdominal bloating and was screened for thyroid disorder and celiac disease, both negative. She has been trialing the FODMAPS diet and has a phone appointment with GIANNA JACOBSON this Friday, 21. She reports she exercises 100 minutes a day, and will do bike, trampoline, aerobics, yoga.  She relates the fullness to when she was pregnant but not symptoms of pregnancy, and she reports the Mirena IUD is in place.     Today's PHQ-2 Score:   PHQ-2 (  [employer]) 3/31/2021   Q1: Little interest or pleasure in doing things 0   Q2: Feeling down, depressed or hopeless 0   PHQ-2 Score 0   Q1: Little interest or pleasure in doing things Not at all   Q2: Feeling down, depressed or hopeless Not at all   PHQ-2 Score 0       Abuse: Current or Past (Physical, Sexual or Emotional) - Won't answer  Do you feel safe in your environment? Yes    Have you ever done Advance Care Planning? (For  example, a Health Directive, POLST, or a discussion with a medical provider or your loved ones about your wishes): No, advance care planning information given to patient to review.  Patient plans to discuss their wishes with loved ones or provider.      Social History     Tobacco Use     Smoking status: Former Smoker     Packs/day: 0.50     Types: Cigarettes, Other     Smokeless tobacco: Never Used     Tobacco comment: Does JUUL e cig    Substance Use Topics     Alcohol use: No     Alcohol/week: 0.0 standard drinks         Alcohol Use 3/31/2021   Prescreen: >3 drinks/day or >7 drinks/week? No   Prescreen: >3 drinks/day or >7 drinks/week? -       Reviewed orders with patient.  Reviewed health maintenance and updated orders accordingly - Yes  Labs reviewed in EPIC    Breast Cancer Screening:    Breast CA Risk Assessment (FHS-7) 3/31/2021   Do you have a family history of breast, colon, or ovarian cancer? No / Unknown     Mammogram Screening: Recommended annual mammography  Pertinent mammograms are reviewed under the imaging tab.    History of abnormal Pap smear: NO - age 30-65 PAP every 5 years with negative HPV co-testing recommended  PAP / HPV Latest Ref Rng & Units 7/9/2018 4/13/2017 9/30/2015   PAP - NIL NIL NIL   PAP DATE - [facility] - - - -   HPV 16 DNA NEG:Negative Negative Negative Negative   HPV 18 DNA NEG:Negative Negative Negative Negative   OTHER HR HPV NEG:Negative Negative Negative Negative     Reviewed and updated as needed this visit by clinical staff  Tobacco  Allergies  Meds  Problems  Med Hx  Surg Hx  Fam Hx          Reviewed and updated as needed this visit by Provider  Tobacco  Allergies  Meds  Problems  Med Hx  Surg Hx  Fam Hx             Review of Systems   Constitutional: Negative for chills and fever.   HENT: Positive for ear pain and hearing loss. Negative for congestion.    Eyes: Negative for pain.   Respiratory: Negative for cough.    Cardiovascular: Negative for chest pain and  "peripheral edema.   Gastrointestinal: Positive for heartburn. Negative for abdominal pain, constipation, diarrhea and hematochezia.   Genitourinary: Negative for frequency, genital sores and hematuria.   Musculoskeletal: Positive for myalgias. Negative for arthralgias and joint swelling.   Neurological: Positive for dizziness and headaches.   Psychiatric/Behavioral: Positive for mood changes. The patient is nervous/anxious.      Changes in weather may be causing ear pain and headaches.   She says dizziness from needing to get used to bifocals.   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     OBJECTIVE:   /88   Pulse 98   Temp 97.8  F (36.6  C) (Tympanic)   Resp 20   Ht 1.619 m (5' 3.75\")   Wt 74.8 kg (165 lb)   SpO2 98%   BMI 28.54 kg/m    Physical Exam  GENERAL: healthy, alert and no distress  EYES: Eyes grossly normal to inspection, PERRL and conjunctivae and sclerae normal  HENT: ear canals and TM's normal, nose and mouth without ulcers or lesions  NECK: no adenopathy, no asymmetry, masses, or scars and thyroid normal to palpation  RESP: lungs clear to auscultation - no rales, rhonchi or wheezes  BREAST: normal without masses, tenderness or nipple discharge and no palpable axillary masses or adenopathy  CV: regular rate and rhythm, normal S1 S2, no S3 or S4, no murmur, click or rub, no peripheral edema and peripheral pulses strong  ABDOMEN: soft, nontender, distended, no hepatosplenomegaly, no masses and bowel sounds normal  MS: no gross musculoskeletal defects noted, no edema  SKIN: no suspicious lesions or rashes  NEURO: Normal strength and tone, mentation intact and speech normal  PSYCH: mentation appears normal, affect normal/bright    Diagnostic Test Results:  Results for orders placed or performed in visit on 03/31/21   Comprehensive metabolic panel     Status: Abnormal   Result Value Ref Range    Sodium " "139 133 - 144 mmol/L    Potassium 4.6 3.4 - 5.3 mmol/L    Chloride 104 94 - 109 mmol/L    Carbon Dioxide 31 20 - 32 mmol/L    Anion Gap 4 3 - 14 mmol/L    Glucose 104 (H) 70 - 99 mg/dL    Urea Nitrogen 24 7 - 30 mg/dL    Creatinine 0.81 0.52 - 1.04 mg/dL    GFR Estimate 83 >60 mL/min/[1.73_m2]    GFR Estimate If Black >90 >60 mL/min/[1.73_m2]    Calcium 8.9 8.5 - 10.1 mg/dL    Bilirubin Total 0.3 0.2 - 1.3 mg/dL    Albumin 3.8 3.4 - 5.0 g/dL    Protein Total 7.6 6.8 - 8.8 g/dL    Alkaline Phosphatase 80 40 - 150 U/L    ALT 85 (H) 0 - 50 U/L    AST 39 0 - 45 U/L   Amylase     Status: None   Result Value Ref Range    Amylase 62 30 - 110 U/L   Lipase     Status: None   Result Value Ref Range    Lipase 169 73 - 393 U/L       ASSESSMENT/PLAN:   1. 2. Routine general medical examination at a health care facility  Patient has unremarkable physical exam except the noted abdominal distention. Preventative health recommendations education was provided on after visit summary.    2. Abdominal swelling, generalized  Patient has distended non-tender abdomen with no notable hepatosplenomegaly on exam. Will screen for elevated liver enzymes due to her history of having elevated values and history of alcohol use disorder.  Follow up with GI as planned on Friday  - Comprehensive metabolic panel  - Amylase  - Lipase      Patient has been advised of split billing requirements and indicates understanding: Yes  COUNSELING:  Reviewed preventive health counseling, as reflected in patient instructions    Estimated body mass index is 28.54 kg/m  as calculated from the following:    Height as of this encounter: 1.619 m (5' 3.75\").    Weight as of this encounter: 74.8 kg (165 lb).        She reports that she has quit smoking. Her smoking use included cigarettes and other. She smoked 0.50 packs per day. She has never used smokeless tobacco.      Counseling Resources:  ATP IV Guidelines  Pooled Cohorts Equation Calculator  Breast Cancer Risk " Calculator  BRCA-Related Cancer Risk Assessment: FHS-7 Tool  FRAX Risk Assessment  ICSI Preventive Guidelines  Dietary Guidelines for Americans, 2010  USDA's MyPlate  ASA Prophylaxis  Lung CA Screening    MONIQUE Herrera [facility]

## 2021-04-01 DIAGNOSIS — F33.0 MAJOR DEPRESSIVE DISORDER, RECURRENT EPISODE, MILD (H): Chronic | ICD-10-CM

## 2021-04-01 DIAGNOSIS — G47.00 PERSISTENT INSOMNIA: Chronic | ICD-10-CM

## 2021-04-01 DIAGNOSIS — F41.1 GENERALIZED ANXIETY DISORDER: Chronic | ICD-10-CM

## 2021-04-01 RX ORDER — TEMAZEPAM 15 MG/1
CAPSULE ORAL
Qty: 60 CAPSULE | Refills: 5 | Status: SHIPPED | OUTPATIENT
Start: 2021-04-01 | End: 2021-11-18

## 2021-04-01 NOTE — TELEPHONE ENCOUNTER
Pended RX  Temazepam 15 mg Capsules, DISP  60 caps, SIG:  Take 1-2 capsules by mouth 15-30 minutes before bed.  R5  Last Written Prescription Date: 9/28/2020  Last Fill Quantity: 60   # refills: 5  Last Office Visit: 9/28/2020  Future Office visit:  None scheduled RTC 1 year    Routed Dr. Blake for review.  Next 5 appointments (look out 90 days)    May 10, 2021  9:00 AM  Telephone Visit with Nerissa Garcia DO  [facility] ([facility] [address] [address] [place] [id number] [phone]           Routing refill request to provider for review/approval because:  Drug not on the [facility], [facility] or [facility] refill protocol or controlled substance

## 2021-04-02 ENCOUNTER — TELEPHONE (OUTPATIENT)
Dept: FAMILY MEDICINE | Facility: CLINIC | Age: 51
End: 2021-04-02

## 2021-04-02 NOTE — TELEPHONE ENCOUNTER
Reason for Call:  Other call back    Detailed comments: pt called and wanted to know if CT was going to call her or if they do CT at clinic.    Phone Number Patient can be reached at: Cell number on file:    Telephone Information:   Mobile [phone]       Best Time: any time    Can we leave a detailed message on this number? YES    Call taken on 4/2/2021 at 4:55 PM by Gia Choudhary

## 2021-04-12 ENCOUNTER — HOSPITAL ENCOUNTER (OUTPATIENT)
Dept: CT IMAGING | Facility: CLINIC | Age: 51
Discharge: HOME | End: 2021-04-12
Attending: INTERNAL MEDICINE | Admitting: INTERNAL MEDICINE
Payer: COMMERCIAL

## 2021-04-12 DIAGNOSIS — R14.0 ABDOMINAL DISTENTION: ICD-10-CM

## 2021-04-12 PROCEDURE — 250N000009 HC RX 250: Performed by: RADIOLOGY

## 2021-04-12 PROCEDURE — 250N000011 HC RX IP 250 OP 636: Performed by: RADIOLOGY

## 2021-04-12 PROCEDURE — 74177 CT ABD & PELVIS W/CONTRAST: CPT

## 2021-04-12 RX ORDER — IOPAMIDOL 755 MG/ML
80 INJECTION, SOLUTION INTRAVASCULAR ONCE
Status: COMPLETED | OUTPATIENT
Start: 2021-04-12 | End: 2021-04-12

## 2021-04-12 RX ADMIN — IOPAMIDOL 80 ML: 755 INJECTION, SOLUTION INTRAVENOUS at 08:29

## 2021-04-12 RX ADMIN — SODIUM CHLORIDE 60 ML: 9 INJECTION, SOLUTION INTRAVENOUS at 08:29

## 2021-04-29 ENCOUNTER — TELEPHONE (OUTPATIENT)
Dept: FAMILY MEDICINE | Facility: CLINIC | Age: 51
End: 2021-04-29

## 2021-04-29 NOTE — TELEPHONE ENCOUNTER
Can someone please look for result or call for it to be refaxed.  I did not see it in my incoming folder as of this morning, a few hours ago.  Drop on my desk or route back to me please.

## 2021-04-29 NOTE — TELEPHONE ENCOUNTER
Reason for Call:  Request for results: Patient is calling and wanting to know the result of her CT Scan at [facility]. Stated it was faxed to [facility] and tried to call for the result and wont give her the result.    Name of test or procedure: CT Scan    Date of test of procedure: 04/12/2021  Location of the test or procedure: [facility]    OK to leave the result message on voice mail or with a family member? YES    Phone number Patient can be reached at:  Cell number on file:    Telephone Information:   Mobile [phone]       Additional comments: any    Call taken on 4/29/2021 at 9:32 AM by Morelia Tang

## 2021-04-29 NOTE — TELEPHONE ENCOUNTER
Called pt 4/29/21 at 3:12 and LM to call the clinic, please tell her that [facility] doctor has not read the results of her CT and we are waiting for [facility] to release the results.    Gia [facility] Sec

## 2021-04-29 NOTE — TELEPHONE ENCOUNTER
I called [facility] and they said that Dr. George has not resulted on it yet. They did a high priority message to him today 4/29/21 at 3:00pm. I will call the pt to let her know what is going on.    Gia [facility] Sec

## 2021-05-29 ENCOUNTER — RECORDS - HEALTHEAST (OUTPATIENT)
Dept: ADMINISTRATIVE | Facility: CLINIC | Age: 51
End: 2021-05-29

## 2021-05-30 ENCOUNTER — RECORDS - HEALTHEAST (OUTPATIENT)
Dept: ADMINISTRATIVE | Facility: CLINIC | Age: 51
End: 2021-05-30

## 2021-06-01 ENCOUNTER — RECORDS - HEALTHEAST (OUTPATIENT)
Dept: ADMINISTRATIVE | Facility: CLINIC | Age: 51
End: 2021-06-01

## 2021-07-21 ENCOUNTER — RECORDS - HEALTHEAST (OUTPATIENT)
Dept: ADMINISTRATIVE | Facility: CLINIC | Age: 51
End: 2021-07-21

## 2021-11-18 ENCOUNTER — OFFICE VISIT (OUTPATIENT)
Dept: FAMILY MEDICINE | Facility: CLINIC | Age: 51
End: 2021-11-18
Payer: COMMERCIAL

## 2021-11-18 VITALS
SYSTOLIC BLOOD PRESSURE: 152 MMHG | WEIGHT: 166 LBS | DIASTOLIC BLOOD PRESSURE: 100 MMHG | TEMPERATURE: 98.3 F | RESPIRATION RATE: 16 BRPM | HEIGHT: 64 IN | HEART RATE: 110 BPM | BODY MASS INDEX: 28.34 KG/M2 | OXYGEN SATURATION: 97 %

## 2021-11-18 DIAGNOSIS — F41.0 ANXIETY ATTACK: ICD-10-CM

## 2021-11-18 DIAGNOSIS — F33.9 RECURRENT MAJOR DEPRESSIVE DISORDER, REMISSION STATUS UNSPECIFIED (H): ICD-10-CM

## 2021-11-18 DIAGNOSIS — F10.21 ALCOHOL DEPENDENCE IN REMISSION (H): ICD-10-CM

## 2021-11-18 DIAGNOSIS — F51.01 IDIOPATHIC INSOMNIA: Primary | ICD-10-CM

## 2021-11-18 PROCEDURE — 99214 OFFICE O/P EST MOD 30 MIN: CPT | Performed by: PHYSICIAN ASSISTANT

## 2021-11-18 RX ORDER — AMITRIPTYLINE HYDROCHLORIDE 50 MG/1
100-150 TABLET ORAL AT BEDTIME
Qty: 90 TABLET | Refills: 11 | Status: SHIPPED | OUTPATIENT
Start: 2021-11-18 | End: 2022-01-03

## 2021-11-18 RX ORDER — QUETIAPINE FUMARATE 25 MG/1
25-50 TABLET, FILM COATED ORAL 2 TIMES DAILY PRN
Qty: 30 TABLET | Refills: 1 | Status: SHIPPED | OUTPATIENT
Start: 2021-11-18 | End: 2022-01-03

## 2021-11-18 ASSESSMENT — MIFFLIN-ST. JEOR: SCORE: 1339

## 2021-11-18 ASSESSMENT — ANXIETY QUESTIONNAIRES
8. IF YOU CHECKED OFF ANY PROBLEMS, HOW DIFFICULT HAVE THESE MADE IT FOR YOU TO DO YOUR WORK, TAKE CARE OF THINGS AT HOME, OR GET ALONG WITH OTHER PEOPLE?: SOMEWHAT DIFFICULT
2. NOT BEING ABLE TO STOP OR CONTROL WORRYING: SEVERAL DAYS
GAD7 TOTAL SCORE: 5
7. FEELING AFRAID AS IF SOMETHING AWFUL MIGHT HAPPEN: NOT AT ALL
1. FEELING NERVOUS, ANXIOUS, OR ON EDGE: SEVERAL DAYS
6. BECOMING EASILY ANNOYED OR IRRITABLE: SEVERAL DAYS
7. FEELING AFRAID AS IF SOMETHING AWFUL MIGHT HAPPEN: NOT AT ALL
4. TROUBLE RELAXING: SEVERAL DAYS
GAD7 TOTAL SCORE: 5
GAD7 TOTAL SCORE: 5
5. BEING SO RESTLESS THAT IT IS HARD TO SIT STILL: NOT AT ALL
3. WORRYING TOO MUCH ABOUT DIFFERENT THINGS: SEVERAL DAYS

## 2021-11-18 ASSESSMENT — PATIENT HEALTH QUESTIONNAIRE - PHQ9
SUM OF ALL RESPONSES TO PHQ QUESTIONS 1-9: 5
SUM OF ALL RESPONSES TO PHQ QUESTIONS 1-9: 5
10. IF YOU CHECKED OFF ANY PROBLEMS, HOW DIFFICULT HAVE THESE PROBLEMS MADE IT FOR YOU TO DO YOUR WORK, TAKE CARE OF THINGS AT HOME, OR GET ALONG WITH OTHER PEOPLE: SOMEWHAT DIFFICULT

## 2021-11-18 ASSESSMENT — PAIN SCALES - GENERAL: PAINLEVEL: MILD PAIN (2)

## 2021-11-18 NOTE — PROGRESS NOTES
Assessment & Plan     Idiopathic insomnia  refill  - amitriptyline (ELAVIL) 50 MG tablet; Take 2-3 tablets (100-150 mg) by mouth At Bedtime    Anxiety attack  Worse, new med to try.  Patient feels this is largely situational and will improve when job circumstance improves.  - QUEtiapine (SEROQUEL) 25 MG tablet; Take 1-2 tablets (25-50 mg) by mouth 2 times daily as needed (anxiety/panic)    Alcohol dependence in remission (H)  stable    Recurrent major depressive disorder, remission status unspecified (H)  Overall stable, follow    Elev BP rechecked today.    Patient Instructions   Refilled amitriptyline  Can try seroquel for as needed  Let me know if want counseling or day treatment referral or anything else I can do    Due for mammogram    [place] Mammo Schedule    [facility] Mammo Schedule  [place] ~ [phone]  Every other Monday or Wednesday   & one Saturday morning a month    [place] ~ [phone]  Every Other Monday - rotating full day versus just PM    [place] ~ [phone]  One Monday morning per month    [place] ~ [phone]  Every Monday morning  Every Tuesday afternoon  Wed, Thurs, Fri morning and afternoon    Mammogram walk-in hours in [place]: Monday-Friday, 8 a.m. - 4 p.m.  Questions? Call [phone].        No follow-ups on file.    LISSA Campo [facility]    Xochilt Schwarz is a 51 year old who presents for the following health issues     HPI     Depression and Anxiety Follow-Up    How are you doing with your depression since your last visit? Worsened     How are you doing with your anxiety since your last visit?  Worsened     Are you having other symptoms that might be associated with depression or anxiety? Yes:  had a panic attack Tuesday    Have you had a significant life event? No     Do you have any concerns with your use of alcohol or other drugs? No    No longer needing temazepam for sleep.  Hated being on it, felt pharmacy  "judged her.  Finds 100 minutes of exercise/day very helpful, and now on amitriptyline for sleep.  Some dry mouth but no other side effects.    \"Everyone thinks I'm a worthless piece of shit every day.\"  Feels her previous daily attendance at  was not kept private and that the entire community judges her.  \"Every job has ended in a huge disaster.\"  Currently not working since Aug - working on another place to [employer].  Close friend with , otherwise few supports.   somewhat understanding.  Not interested in counseling.    Social History     Tobacco Use     Smoking status: Former Smoker     Packs/day: 0.50     Types: Cigarettes, Other     Smokeless tobacco: Never Used     Tobacco comment: Does JUUL e cig    Vaping Use     Vaping Use: Every day     Substances: Nicotine     Devices: Pre-filled pod   Substance Use Topics     Alcohol use: No     Alcohol/week: 0.0 standard drinks     Drug use: No     PHQ 2/20/2019 3/15/2021 11/18/2021   PHQ-9 Total Score 1 0 5   Q9: Thoughts of better off dead/self-harm past 2 weeks Not at all Not at all Several days   F/U: Thoughts of suicide or self-harm - - No   F/U: Safety concerns - - No   Some encounter information is confidential and restricted. Go to Review [employer] activity to see all data.     RALEIGH-7 SCORE 2/20/2019 3/15/2021 11/18/2021   Total Score - - -   Total Score 1 (minimal anxiety) 2 (minimal anxiety) 5 (mild anxiety)   Total Score 1 2 5   Total Score - - -   Some encounter information is confidential and restricted. Go to [employer] activity to see all data.     Last PHQ-9 11/18/2021   1.  Little interest or pleasure in doing things 0   2.  Feeling down, depressed, or hopeless 1   3.  Trouble falling or staying asleep, or sleeping too much 0   4.  Feeling tired or having little energy 1   5.  Poor appetite or overeating 0   6.  Feeling bad about yourself 2   7.  Trouble concentrating 0   8.  Moving slowly or restless 0   Q9: Thoughts of " "better off dead/self-harm past 2 weeks 1   PHQ-9 Total Score 5   Difficulty at work, home, or with people -   In the past two weeks have you had thoughts of suicide or self harm? No   Do you have concerns about your personal safety or the safety of others? No   Some encounter information is confidential and restricted. Go to Review [employer] activity to see all data.     RALEIGH-7  11/18/2021   1. Feeling nervous, anxious, or on edge 1   2. Not being able to stop or control worrying 1   3. Worrying too much about different things 1   4. Trouble relaxing 1   5. Being so restless that it is hard to sit still 0   6. Becoming easily annoyed or irritable 1   7. Feeling afraid, as if something awful might happen 0   RALEIGH-7 Total Score 5   If you checked any problems, how difficult have they made it for you to do your work, take care of things at home, or get along with other people? -   Some encounter information is confidential and restricted. Go to Review [employer] activity to see all data.       How many servings of fruits and vegetables do you eat daily?  4 or more    On average, how many sweetened beverages do you drink each day (Examples: soda, juice, sweet tea, etc.  Do NOT count diet or artificially sweetened beverages)?   0    How many days per week do you exercise enough to make your heart beat faster? 6    How many minutes a day do you exercise enough to make your heart beat faster? 60 or more    How many days per week do you miss taking your medication? 0    Objective    BP (!) 148/82 (BP Location: Right arm, Patient Position: Sitting, Cuff Size: Adult Regular)   Pulse 110   Temp 98.3  F (36.8  C) (Tympanic)   Resp 16   Ht 1.619 m (5' 3.75\")   Wt 75.3 kg (166 lb)   LMP  (LMP Unknown)   SpO2 97%   Breastfeeding No   BMI 28.72 kg/m    Body mass index is 28.72 kg/m .          "

## 2021-11-18 NOTE — PATIENT INSTRUCTIONS
Refilled amitriptyline  Can try seroquel for as needed  Let me know if want counseling or day treatment referral or anything else I can do    Due for mammogram    [place] Mammo Schedule    [facility] Mammo Schedule  [place] ~ [phone]  Every other Monday or Wednesday   & one Saturday morning a month    [place] ~ [phone]  Every Other Monday - rotating full day versus just PM    [place] ~ [phone]  One Monday morning per month    [place] ~ [phone]  Every Monday morning  Every Tuesday afternoon  Wed, Thurs, Fri morning and afternoon    Mammogram walk-in hours in [place]: Monday-Friday, 8 a.m. - 4 p.m.  Questions? Call [phone].

## 2021-11-19 ASSESSMENT — PATIENT HEALTH QUESTIONNAIRE - PHQ9: SUM OF ALL RESPONSES TO PHQ QUESTIONS 1-9: 5

## 2021-11-19 ASSESSMENT — ANXIETY QUESTIONNAIRES: GAD7 TOTAL SCORE: 5

## 2022-01-03 ENCOUNTER — TELEPHONE (OUTPATIENT)
Dept: FAMILY MEDICINE | Facility: CLINIC | Age: 52
End: 2022-01-03
Payer: COMMERCIAL

## 2022-01-03 DIAGNOSIS — F51.01 IDIOPATHIC INSOMNIA: ICD-10-CM

## 2022-01-03 DIAGNOSIS — F41.0 ANXIETY ATTACK: ICD-10-CM

## 2022-01-03 RX ORDER — AMITRIPTYLINE HYDROCHLORIDE 50 MG/1
100-150 TABLET ORAL AT BEDTIME
Qty: 90 TABLET | Refills: 11 | Status: SHIPPED | OUTPATIENT
Start: 2022-01-03 | End: 2022-09-14

## 2022-01-03 RX ORDER — QUETIAPINE FUMARATE 25 MG/1
25-50 TABLET, FILM COATED ORAL 2 TIMES DAILY PRN
Qty: 30 TABLET | Refills: 1 | Status: SHIPPED | OUTPATIENT
Start: 2022-01-03 | End: 2022-02-25

## 2022-02-25 ENCOUNTER — VIRTUAL VISIT (OUTPATIENT)
Dept: FAMILY MEDICINE | Facility: CLINIC | Age: 52
End: 2022-02-25
Payer: COMMERCIAL

## 2022-02-25 DIAGNOSIS — F33.9 RECURRENT MAJOR DEPRESSIVE DISORDER, REMISSION STATUS UNSPECIFIED (H): ICD-10-CM

## 2022-02-25 DIAGNOSIS — R79.89 ELEVATED LFTS: Primary | ICD-10-CM

## 2022-02-25 DIAGNOSIS — T88.7XXA MEDICATION SIDE EFFECTS: ICD-10-CM

## 2022-02-25 DIAGNOSIS — F41.0 ANXIETY ATTACK: ICD-10-CM

## 2022-02-25 DIAGNOSIS — F41.1 GENERALIZED ANXIETY DISORDER: ICD-10-CM

## 2022-02-25 DIAGNOSIS — F10.21 ALCOHOL DEPENDENCE IN REMISSION (H): ICD-10-CM

## 2022-02-25 DIAGNOSIS — G47.00 PERSISTENT INSOMNIA: ICD-10-CM

## 2022-02-25 PROCEDURE — 99215 OFFICE O/P EST HI 40 MIN: CPT | Mod: 95 | Performed by: INTERNAL MEDICINE

## 2022-02-25 RX ORDER — QUETIAPINE FUMARATE 25 MG/1
25 TABLET, FILM COATED ORAL DAILY PRN
Qty: 30 TABLET | Refills: 1 | Status: SHIPPED | OUTPATIENT
Start: 2022-02-25 | End: 2022-09-14

## 2022-02-25 NOTE — PROGRESS NOTES
"Karishma is a 51 year old who is being evaluated via a billable telephone visit.      What phone number would you like to be contacted at? [phone]  How would you like to obtain your AVS? Mail a copy    Assessment & Plan   Problem List Items Addressed This Visit        Nervous and Auditory    Alcohol dependence in remission (H)       Behavioral    Generalized anxiety disorder (Chronic)    Relevant Orders    Adult Mental Health  Referral    Recurrent major depressive disorder, remission status unspecified (H)       Other    Persistent insomnia (Chronic)    Relevant Orders    Adult Mental Health  Referral      Other Visit Diagnoses     Elevated LFTs    -  Primary    Relevant Orders    **AST FUTURE 2mo    ALT    US Abdomen Limited    Anxiety attack        Relevant Medications    QUEtiapine (SEROQUEL) 25 MG tablet    Medication side effects        Relevant Orders    EKG 12-lead complete w/read - Clinics (Completed)         Discussed medication side effects including risk of QT prolongation with combination of amitriptyline and Seroquel.  She will need an EKG as well. Refill given for Seroquel, seems well-tolerated and is a low-dose, reassuring.  We discussed elevated liver function tests, need for ultrasound of the liver as a follow-up.  Repeat liver enzymes as well.  Patient in agreement to see mental health provider as well for further evaluation and treatment.  All questions answered.  To follow-up with PCP.         BMI:   Estimated body mass index is 28.72 kg/m  as calculated from the following:    Height as of 11/18/21: 1.619 m (5' 3.75\").    Weight as of 11/18/21: 75.3 kg (166 lb).     CONSULTATION/REFERRAL to mental health provider  See Patient Instructions    No follow-ups on file.  Total time spent was 53 minutes, review of records and recommendations  Doc Sheppard MD  [facility]    Xochilt Schwarz is a 51 year old who presents for the following health issues "     HPI     Medication Followup of QUEtiapine (SEROQUEL) 25 MG tablet    Taking Medication as prescribed: yes    Side Effects:  None    Medication Helping Symptoms:  yes     Patient presenting for refill of her medication mainly Seroquel, she takes 25 mg daily at bedtime. Patient follows with the [facility] with Dara who is out of the office for a while. She reports she was started on Seroquel 25 to 50 mg daily at bedtime but she has been taking once a day only and that seems to be working for her with her sleep problems. She is maintained on amitriptyline, she was prescribed 100 -150 mg daily but she takes only 100 mg daily. Patient denies any side effects from medication. She reports she remains sober from alcohol intake. She has history of elevated liver function test, last AST was in the 80s. Denies any abdominal pain, she had a CT of her abdomen that showed fatty liver.  Okay so the question of the question is doing labs he is going for .FVPHY is no cardiomyopathy diagnosed with his abdomen in as reviewed by me in the medical evaluation of diabetes reviewed the labs and imaging    The emergency room is available in the next evaluation involved in by this disease by the neurology is an addendum MRI    Data    Disability can do that result with gabapentin              Review of Systems   Constitutional, HEENT, cardiovascular, pulmonary, gi and gu systems are negative, except as otherwise noted.      Objective           Vitals:  No vitals were obtained today due to virtual visit.    Physical Exam   healthy, alert and no distress  PSYCH: Alert and oriented times 3; coherent speech, normal   rate and volume, able to articulate logical thoughts, able   to abstract reason, no tangential thoughts, no hallucinations   or delusions  Her affect is normal  RESP: No cough, no audible wheezing, able to talk in full sentences  Remainder of exam unable to be completed due to telephone visits    Office Visit on  03/31/2021   Component Date Value Ref Range Status     Sodium 03/31/2021 139  133 - 144 mmol/L Final     Potassium 03/31/2021 4.6  3.4 - 5.3 mmol/L Final     Chloride 03/31/2021 104  94 - 109 mmol/L Final     Carbon Dioxide 03/31/2021 31  20 - 32 mmol/L Final     Anion Gap 03/31/2021 4  3 - 14 mmol/L Final     Glucose 03/31/2021 104 (A) 70 - 99 mg/dL Final     Urea Nitrogen 03/31/2021 24  7 - 30 mg/dL Final     Creatinine 03/31/2021 0.81  0.52 - 1.04 mg/dL Final     GFR Estimate 03/31/2021 83  >60 mL/min/[1.73_m2] Final    Comment: Non  GFR Calc  Starting 12/18/2018, serum creatinine based estimated GFR (eGFR) will be   calculated using the Chronic Kidney Disease Epidemiology Collaboration   (CKD-EPI) equation.       GFR Estimate If Black 03/31/2021 >90  >60 mL/min/[1.73_m2] Final    Comment:  GFR Calc  Starting 12/18/2018, serum creatinine based estimated GFR (eGFR) will be   calculated using the Chronic Kidney Disease Epidemiology Collaboration   (CKD-EPI) equation.       Calcium 03/31/2021 8.9  8.5 - 10.1 mg/dL Final     Bilirubin Total 03/31/2021 0.3  0.2 - 1.3 mg/dL Final     Albumin 03/31/2021 3.8  3.4 - 5.0 g/dL Final     Protein Total 03/31/2021 7.6  6.8 - 8.8 g/dL Final     Alkaline Phosphatase 03/31/2021 80  40 - 150 U/L Final     ALT 03/31/2021 85 (A) 0 - 50 U/L Final     AST 03/31/2021 39  0 - 45 U/L Final     Amylase 03/31/2021 62  30 - 110 U/L Final     Lipase 03/31/2021 169  73 - 393 U/L Final           Phone call duration: 15 minutes, total time spent review of records and telephone visit was 50 minutes

## 2022-03-29 ENCOUNTER — TELEPHONE (OUTPATIENT)
Dept: FAMILY MEDICINE | Facility: CLINIC | Age: 52
End: 2022-03-29
Payer: COMMERCIAL

## 2022-03-29 NOTE — LETTER
March 29, 2022      Karishma Kimball  [address] [facility] [address]      Your healthcare team cares about your health. To provide you with the best care, we have reviewed your chart and based on our findings, we see that you are due to:     - BREAST CANCER SCREENING:  Schedule Annual Mammogram. Breast center scheduling number - [phone] or schedule in MyChart (self referall)    If you have already completed these items, please contact the clinic via phone or [facility] so your care team can review and update your records.  Thank you for choosing [facility] for your healthcare needs. For any questions, concerns, or to schedule an appointment please contact the clinic.       Healthy Regards,      Your [facility] Care Team

## 2022-03-29 NOTE — TELEPHONE ENCOUNTER
Patient Quality Outreach    Patient is due for the following:   Breast Cancer Screening - Mammogram    NEXT STEPS:   No follow up needed at this time.    Type of outreach:    Sent letter.      Questions for provider review:    None     Elle Garcia CMA

## 2022-04-30 ENCOUNTER — OFFICE VISIT (OUTPATIENT)
Dept: URGENT CARE | Facility: URGENT CARE | Age: 52
End: 2022-04-30
Payer: COMMERCIAL

## 2022-04-30 VITALS
DIASTOLIC BLOOD PRESSURE: 100 MMHG | TEMPERATURE: 97.4 F | OXYGEN SATURATION: 99 % | WEIGHT: 157 LBS | HEART RATE: 89 BPM | BODY MASS INDEX: 27.16 KG/M2 | SYSTOLIC BLOOD PRESSURE: 144 MMHG
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
## 2022-04-30 DIAGNOSIS — I10 HYPERTENSION, UNSPECIFIED TYPE: ICD-10-CM

## 2022-04-30 PROCEDURE — 99214 OFFICE O/P EST MOD 30 MIN: CPT | Mod: 25 | Performed by: PHYSICIAN ASSISTANT

## 2022-04-30 PROCEDURE — 96372 THER/PROPH/DIAG INJ SC/IM: CPT | Performed by: PHYSICIAN ASSISTANT

## 2022-04-30 RX ORDER — CEFTRIAXONE SODIUM 1 G
1 VIAL (EA) INJECTION ONCE
Status: COMPLETED | OUTPATIENT
Start: 2022-04-30 | End: 2022-04-30

## 2022-04-30 RX ADMIN — Medication 1 G: at 12:53

## 2022-04-30 NOTE — PROGRESS NOTES
"  Assessment & Plan     Cat bite, initial encounter  Will treat wtihamoxicillin-clavulanate (AUGMENTIN) 875-125 MG tablet; Take 1 tablet by mouth 2 times daily for 7 days and Rocephin 1g IM x 1 given in clinic. Monitor symptoms very closely. Go to the ED if symptoms worsen in 48 hours.       - cefTRIAXone (ROCEPHIN) in lidocaine 1% (PF) injection 1 g             BMI:   Estimated body mass index is 27.16 kg/m  as calculated from the following:    Height as of 11/18/21: 1.619 m (5' 3.75\").    Weight as of this encounter: 71.2 kg (157 lb).           No follow-ups on file.    LISSA Hairston [facility] [place]            Subjective   Chief Complaint   Patient presents with     Trauma     Got bit by her cat this morning on left hand. It is red, swollen and warm to the touch, getting less mobile.          HPI     Cat bite     Onset of symptoms was this morning   Course of illness is same.    Severity moderate  Current and Associated symptoms: cat bite of left hand  Treatment measures tried include None tried.  Predisposing factors include None.                Review of Systems   Constitutional, HEENT, cardiovascular, pulmonary, gi and gu systems are negative, except as otherwise noted.      Objective    BP (!) 144/100   Pulse 89   Temp 97.4  F (36.3  C) (Tympanic)   Wt 71.2 kg (157 lb)   SpO2 99%   BMI 27.16 kg/m    Body mass index is 27.16 kg/m .  Physical Exam  Constitutional:       General: She is not in acute distress.  Skin:     Comments: Left hand has 2 puncture wounds with surrounding swelling and erythema around 2nd and 3rd knuckles. no drainage/discharge.     Neurological:      Mental Status: She is alert.                        "

## 2022-05-24 ENCOUNTER — TELEPHONE (OUTPATIENT)
Dept: FAMILY MEDICINE | Facility: CLINIC | Age: 52
End: 2022-05-24
Payer: COMMERCIAL

## 2022-06-17 ENCOUNTER — OFFICE VISIT (OUTPATIENT)
Dept: FAMILY MEDICINE | Facility: CLINIC | Age: 52
End: 2022-06-17
Payer: COMMERCIAL

## 2022-06-17 VITALS
DIASTOLIC BLOOD PRESSURE: 100 MMHG | HEART RATE: 108 BPM | RESPIRATION RATE: 12 BRPM | TEMPERATURE: 98.2 F | SYSTOLIC BLOOD PRESSURE: 148 MMHG | WEIGHT: 152 LBS | BODY MASS INDEX: 26.3 KG/M2

## 2022-06-17 DIAGNOSIS — Z12.31 ENCOUNTER FOR SCREENING MAMMOGRAM FOR BREAST CANCER: ICD-10-CM

## 2022-06-17 DIAGNOSIS — F33.9 RECURRENT MAJOR DEPRESSIVE DISORDER, REMISSION STATUS UNSPECIFIED (H): ICD-10-CM

## 2022-06-17 DIAGNOSIS — L30.9 ECZEMA, UNSPECIFIED TYPE: Primary | ICD-10-CM

## 2022-06-17 PROCEDURE — 99213 OFFICE O/P EST LOW 20 MIN: CPT | Performed by: NURSE PRACTITIONER

## 2022-06-17 RX ORDER — TRIAMCINOLONE ACETONIDE 1 MG/G
CREAM TOPICAL 2 TIMES DAILY
Qty: 453.6 G | Refills: 1 | Status: SHIPPED | OUTPATIENT
Start: 2022-06-17

## 2022-06-17 ASSESSMENT — PAIN SCALES - GENERAL: PAINLEVEL: NO PAIN (0)

## 2022-06-17 NOTE — PROGRESS NOTES
Assessment & Plan     Eczema, unspecified type  Patient has been using some oils to the skin, new products with laundry detergent and shampoo but changed back to the old products and no changes in the skin rash on her chest, face and external ears.  This is scaly on chest and ears with being itchy, and there are also some papular lesions on chest and chin.  Treating with Kenalog cream for eczema twice daily with overlying moisturizing for 2 weeks.  Recommend follow-up in clinic if any persistent symptoms after 2 weeks of treatment.  - triamcinolone (KENALOG) 0.1 % external cream; Apply topically 2 times daily For up to 2 weeks then take a week off if using regularly and restart if needed again for 2 weeks    Encounter for screening mammogram for breast cancer  - MA SCREENING DIGITAL BILAT - Future  (s+30); Future    Recurrent major depressive disorder, remission status unspecified (H)  Patient reassured me that she will not hurt herself.  She has not undergone testing for Seroquel refill due to costs in the past and will follow-up with checking with billing on cost of EKG and AST/ALT labs and have them completed if within a reasonable cost.  She will follow-up with her PCP on this and declines any further discussion.    Depression Screening Follow Up    PHQ 6/17/2022   PHQ-9 Total Score 5   Q9: Thoughts of better off dead/self-harm past 2 weeks Several days   F/U: Thoughts of suicide or self-harm Yes   F/U: Self harm-plan No   F/U: Self-harm action No   F/U: Safety concerns No   Some encounter information is confidential and restricted. Go to Review Flowsheets activity to see all data.       Follow Up  Follow Up Actions Taken  Patient will follow-up with her PCP.    See Patient Instructions    Return in about 2 weeks (around 7/1/2022), or if symptoms worsen or fail to improve.    Kallie Stone NP  [facility]    Xochilt Schwarz is a 51 year old{ presenting for the following  health issues:    Derm Problem      History of Present Illness       Reason for visit:  Rash    She eats 2-3 servings of fruits and vegetables daily.She consumes 0 sweetened beverage(s) daily.She exercises with enough effort to increase her heart rate 10 to 19 minutes per day.  She exercises with enough effort to increase her heart rate 4 days per week.   She is taking medications regularly.    Today's PHQ-9         PHQ-9 Total Score: 5    PHQ-9 Q9 Thoughts of better off dead/self-harm past 2 weeks :   Several days  Thoughts of suicide or self harm: (P) Yes  Self-harm Plan:   (P) No  Self-harm Action:     (P) No  Safety concerns for self or others: (P) No    How difficult have these problems made it for you to do your work, take care of things at home, or get along with other people: Somewhat difficult     Rash  Onset/Duration: Beginning of May  Description  Location: arms, chest, neck, face and ears - neck feels like a burn  Character: round, raised, red  Itching: moderate  Intensity:  mild  Progression of Symptoms:  same  Accompanying signs and symptoms:   Fever: no  Body aches or joint pain: no  Sore throat symptoms: YES- since beginning of May  Recent cold symptoms: no  History:           Previous episodes of similar rash: None  New exposures:  laudry soap, changed soap and then neck rash started and now back to original soaps with no improvement, also using oils on her skin  Recent travel: no  Exposure to similar rash: no  Precipitating or alleviating factors:   Therapies tried and outcome: hydrocortisone cream -  Dried up rash and Aloe, vaseline    Review of Systems   CONSTITUTIONAL: NEGATIVE for fever, chills, change in weight  INTEGUMENTARY/SKIN: POSITIVE for rash chest, chin and outer ear canals  RESP: NEGATIVE for significant cough or SOB  CV: NEGATIVE for chest pain, palpitations or peripheral edema  PSYCHIATRIC: POSITIVE fordepressed mood  ROS otherwise negative      Objective    BP (!) 148/100   Pulse  108   Temp 98.2  F (36.8  C) (Tympanic)   Resp 12   Wt 68.9 kg (152 lb)   BMI 26.30 kg/m    Body mass index is 26.3 kg/m .  Physical Exam   GENERAL: healthy, alert and no distress  SKIN: papule - patient has 5-6 on upper chest and 2-3 on her chin which appear more like acne, there is scaling around the upper chest with redness and some scaling in the ears on the outer aspect of the canals that appear eczematous  PSYCH: mentation appears normal, affect normal/bright

## 2022-06-17 NOTE — PATIENT INSTRUCTIONS
Use steroid cream to areas of rash twice daily once in the morning and once after showering at night.  Apply overlying moisturizer (Aquaphor, Cetaphil, Aveeno daily moisturizer).  You can use moisturizer throughout the day.  Stop use of oils which may be causing symptoms.  Make appointment for mammogram.  Check with getting your second Shingles vaccination and COVID 19 vaccinations if you want to get these.  You can always make a nursing appointment.

## 2022-06-17 NOTE — NURSING NOTE
"Chief Complaint   Patient presents with     Derm Problem     BP (!) 148/100   Pulse 108   Temp 98.2  F (36.8  C) (Tympanic)   Resp 12   Wt 68.9 kg (152 lb)   BMI 26.30 kg/m   Estimated body mass index is 26.3 kg/m  as calculated from the following:    Height as of 11/18/21: 1.619 m (5' 3.75\").    Weight as of this encounter: 68.9 kg (152 lb).  Patient presents to the clinic using No DME      Health Maintenance that is potentially due pending provider review:    Health Maintenance Due   Topic Date Due     ANNUAL REVIEW OF HM ORDERS  Never done     COVID-19 Vaccine (1) Never done     MAMMO SCREENING  08/14/2020     PREVENTIVE CARE VISIT  03/31/2022     ZOSTER IMMUNIZATION (2 of 2) 12/20/2021        NA        "

## 2022-08-15 ENCOUNTER — LAB (OUTPATIENT)
Dept: LAB | Facility: CLINIC | Age: 52
End: 2022-08-15
Payer: COMMERCIAL

## 2022-08-15 DIAGNOSIS — Z20.822 CLOSE EXPOSURE TO 2019 NOVEL CORONAVIRUS: ICD-10-CM

## 2022-08-15 PROCEDURE — U0005 INFEC AGEN DETEC AMPLI PROBE: HCPCS

## 2022-08-15 PROCEDURE — U0003 INFECTIOUS AGENT DETECTION BY NUCLEIC ACID (DNA OR RNA); SEVERE ACUTE RESPIRATORY SYNDROME CORONAVIRUS 2 (SARS-COV-2) (CORONAVIRUS DISEASE [COVID-19]), AMPLIFIED PROBE TECHNIQUE, MAKING USE OF HIGH THROUGHPUT TECHNOLOGIES AS DESCRIBED BY CMS-2020-01-R: HCPCS

## 2022-08-16 LAB — SARS-COV-2 RNA RESP QL NAA+PROBE: NEGATIVE

## 2022-09-14 ENCOUNTER — OFFICE VISIT (OUTPATIENT)
Dept: FAMILY MEDICINE | Facility: CLINIC | Age: 52
End: 2022-09-14
Payer: COMMERCIAL

## 2022-09-14 VITALS
TEMPERATURE: 99.1 F | SYSTOLIC BLOOD PRESSURE: 122 MMHG | OXYGEN SATURATION: 98 % | BODY MASS INDEX: 25.95 KG/M2 | HEIGHT: 64 IN | RESPIRATION RATE: 20 BRPM | HEART RATE: 68 BPM | DIASTOLIC BLOOD PRESSURE: 89 MMHG | WEIGHT: 152 LBS

## 2022-09-14 DIAGNOSIS — F10.21 ALCOHOL DEPENDENCE IN REMISSION (H): ICD-10-CM

## 2022-09-14 DIAGNOSIS — Z13.220 LIPID SCREENING: ICD-10-CM

## 2022-09-14 DIAGNOSIS — R79.89 ELEVATED LFTS: ICD-10-CM

## 2022-09-14 DIAGNOSIS — Z23 NEED FOR PROPHYLACTIC VACCINATION AND INOCULATION AGAINST INFLUENZA: ICD-10-CM

## 2022-09-14 DIAGNOSIS — Z30.432 ENCOUNTER FOR IUD REMOVAL: ICD-10-CM

## 2022-09-14 DIAGNOSIS — F51.01 IDIOPATHIC INSOMNIA: ICD-10-CM

## 2022-09-14 DIAGNOSIS — Z72.0 TOBACCO ABUSE: ICD-10-CM

## 2022-09-14 DIAGNOSIS — L30.4 INTERTRIGO: ICD-10-CM

## 2022-09-14 DIAGNOSIS — Z00.00 ROUTINE GENERAL MEDICAL EXAMINATION AT A HEALTH CARE FACILITY: Primary | ICD-10-CM

## 2022-09-14 DIAGNOSIS — N91.2 ABSENCE OF MENSTRUATION: ICD-10-CM

## 2022-09-14 DIAGNOSIS — F41.0 ANXIETY ATTACK: ICD-10-CM

## 2022-09-14 LAB
CHOLEST SERPL-MCNC: 163 MG/DL
FSH SERPL IRP2-ACNC: 72.5 MIU/ML
HDLC SERPL-MCNC: 103 MG/DL
LDLC SERPL CALC-MCNC: 41 MG/DL
NONHDLC SERPL-MCNC: 60 MG/DL
TRIGL SERPL-MCNC: 93 MG/DL

## 2022-09-14 PROCEDURE — 87624 HPV HI-RISK TYP POOLED RSLT: CPT | Performed by: PHYSICIAN ASSISTANT

## 2022-09-14 PROCEDURE — G0145 SCR C/V CYTO,THINLAYER,RESCR: HCPCS | Performed by: PHYSICIAN ASSISTANT

## 2022-09-14 PROCEDURE — 99396 PREV VISIT EST AGE 40-64: CPT | Mod: 25 | Performed by: PHYSICIAN ASSISTANT

## 2022-09-14 PROCEDURE — 90472 IMMUNIZATION ADMIN EACH ADD: CPT | Performed by: PHYSICIAN ASSISTANT

## 2022-09-14 PROCEDURE — 58301 REMOVE INTRAUTERINE DEVICE: CPT | Performed by: PHYSICIAN ASSISTANT

## 2022-09-14 PROCEDURE — 99214 OFFICE O/P EST MOD 30 MIN: CPT | Mod: 25 | Performed by: PHYSICIAN ASSISTANT

## 2022-09-14 PROCEDURE — 90677 PCV20 VACCINE IM: CPT | Performed by: PHYSICIAN ASSISTANT

## 2022-09-14 PROCEDURE — 90471 IMMUNIZATION ADMIN: CPT | Performed by: PHYSICIAN ASSISTANT

## 2022-09-14 PROCEDURE — 83001 ASSAY OF GONADOTROPIN (FSH): CPT | Performed by: PHYSICIAN ASSISTANT

## 2022-09-14 PROCEDURE — 90682 RIV4 VACC RECOMBINANT DNA IM: CPT | Performed by: PHYSICIAN ASSISTANT

## 2022-09-14 PROCEDURE — 36415 COLL VENOUS BLD VENIPUNCTURE: CPT | Performed by: PHYSICIAN ASSISTANT

## 2022-09-14 PROCEDURE — 80061 LIPID PANEL: CPT | Performed by: PHYSICIAN ASSISTANT

## 2022-09-14 RX ORDER — NYSTATIN 100000 [USP'U]/G
POWDER TOPICAL 2 TIMES DAILY PRN
Qty: 60 G | Refills: 3 | Status: SHIPPED | OUTPATIENT
Start: 2022-09-14 | End: 2024-06-10

## 2022-09-14 RX ORDER — QUETIAPINE FUMARATE 25 MG/1
25 TABLET, FILM COATED ORAL DAILY PRN
Qty: 30 TABLET | Refills: 5 | Status: SHIPPED | OUTPATIENT
Start: 2022-09-14 | End: 2023-02-03

## 2022-09-14 RX ORDER — AMITRIPTYLINE HYDROCHLORIDE 50 MG/1
100-150 TABLET ORAL AT BEDTIME
Qty: 90 TABLET | Refills: 11 | Status: CANCELLED | OUTPATIENT
Start: 2022-09-14

## 2022-09-14 RX ORDER — AMITRIPTYLINE HYDROCHLORIDE 100 MG/1
100 TABLET ORAL AT BEDTIME
Qty: 90 TABLET | Refills: 3 | Status: SHIPPED | OUTPATIENT
Start: 2022-09-14 | End: 2023-02-03

## 2022-09-14 ASSESSMENT — ENCOUNTER SYMPTOMS
JOINT SWELLING: 0
HEMATURIA: 0
NERVOUS/ANXIOUS: 1
DYSURIA: 0
FREQUENCY: 0
COUGH: 0
NAUSEA: 0
BREAST MASS: 0
SORE THROAT: 0
DIARRHEA: 0
FEVER: 0
PARESTHESIAS: 0
SHORTNESS OF BREATH: 0
MYALGIAS: 1
HEMATOCHEZIA: 0
ABDOMINAL PAIN: 0
HEADACHES: 1
WEAKNESS: 0
CONSTIPATION: 0
DIZZINESS: 1
CHILLS: 0
HEARTBURN: 1
ARTHRALGIAS: 0
EYE PAIN: 0
PALPITATIONS: 0

## 2022-09-14 ASSESSMENT — PATIENT HEALTH QUESTIONNAIRE - PHQ9
10. IF YOU CHECKED OFF ANY PROBLEMS, HOW DIFFICULT HAVE THESE PROBLEMS MADE IT FOR YOU TO DO YOUR WORK, TAKE CARE OF THINGS AT HOME, OR GET ALONG WITH OTHER PEOPLE: NOT DIFFICULT AT ALL
SUM OF ALL RESPONSES TO PHQ QUESTIONS 1-9: 10
SUM OF ALL RESPONSES TO PHQ QUESTIONS 1-9: 10

## 2022-09-14 NOTE — PATIENT INSTRUCTIONS
Labs   Do recommend checking liver labs sometime but decided not to today    Restarting seroquel - I don't have much concerns but check cholesterol and blood sugar at least yearly (every 6 months recommended)    Rechecking BP and pulse today   Pneumonia, flu and covid vaccine today  In 2 months get new booster  Keep cutting back on Ecig - [facility] counseling free if desired     Good luck at [facility]   Preventive Health Recommendations  Female Ages 50 - 64    Yearly exam: See your health care provider every year in order to  Review health changes.   Discuss preventive care.    Review your medicines if your doctor has prescribed any.    Get a Pap test every three years (unless you have an abnormal result and your provider advises testing more often).  If you get Pap tests with HPV test, you only need to test every 5 years, unless you have an abnormal result.   You do not need a Pap test if your uterus was removed (hysterectomy) and you have not had cancer.  You should be tested each year for STDs (sexually transmitted diseases) if you're at risk.   Have a mammogram every 1 to 2 years.  Have a colonoscopy at age 50, or have a yearly FIT test (stool test). These exams screen for colon cancer.    Have a cholesterol test every 5 years, or more often if advised.  Have a diabetes test (fasting glucose) every three years. If you are at risk for diabetes, you should have this test more often.   If you are at risk for osteoporosis (brittle bone disease), think about having a bone density scan (DEXA).    Shots: Get a flu shot each year. Get a tetanus shot every 10 years.    Nutrition:   Eat at least 5 servings of fruits and vegetables each day.  Eat whole-grain bread, whole-wheat pasta and brown rice instead of white grains and rice.  Get adequate Calcium and Vitamin D.     Lifestyle  Exercise at least 150 minutes a week (30 minutes a day, 5 days a week). This will help you control your weight and prevent disease.  Limit  alcohol to one drink per day.  No smoking.   Wear sunscreen to prevent skin cancer.   See your dentist every six months for an exam and cleaning.  See your eye doctor every 1 to 2 years.

## 2022-09-14 NOTE — PROGRESS NOTES
SUBJECTIVE:   CC: Karishma is an 52 year old who presents for preventive health visit.     Patient has been advised of split billing requirements and indicates understanding: Yes  Healthy Habits:     Getting at least 3 servings of Calcium per day:  Yes    Bi-annual eye exam:  Yes    Dental care twice a year:  NO    Sleep apnea or symptoms of sleep apnea:  None    Diet:  Regular (no restrictions)    Frequency of exercise:  6-7 days/week    Duration of exercise:  45-60 minutes    Taking medications regularly:  Yes    Medication side effects:  None    PHQ-2 Total Score: 4    Additional concerns today:  No    Switching to [facility] but wanted to say goodbye.  New job x 1 wk going well -  [employer].    Remove mirena.  Wants to be sure is menopausal.    Wants seroquel back - has done ok without but did better with it.    Sleeping well with amitriptyline.    Doesn't wish LFT recheck.      Ecig use has decreased but frustrated to still be on it.    Answers for HPI/ROS submitted by the patient on 9/14/2022  If you checked off any problems, how difficult have these problems made it for you to do your work, take care of things at home, or get along with other people?: Not difficult at all  PHQ9 TOTAL SCORE: 10    Today's PHQ-2 Score:   PHQ-2 ( 1999 Pfizer) 9/14/2022   Q1: Little interest or pleasure in doing things 2   Q2: Feeling down, depressed or hopeless 1   PHQ-2 Score 3   PHQ-2 Total Score (12-17 Years)- Positive if 3 or more points; Administer PHQ-A if positive -   Q1: Little interest or pleasure in doing things More than half the days   Q2: Feeling down, depressed or hopeless Several days   PHQ-2 Score 3     Abuse: Current or Past (Physical, Sexual or Emotional) - No  Do you feel safe in your environment? Yes    Social History     Tobacco Use     Smoking status: Former Smoker     Packs/day: 0.50     Types: Cigarettes, Other     Smokeless tobacco: Never Used     Tobacco comment: Does JUUL e cig    Substance Use Topics      Alcohol use: No     Alcohol/week: 0.0 standard drinks     Alcohol Use 9/14/2022   Prescreen: >3 drinks/day or >7 drinks/week? No   Prescreen: >3 drinks/day or >7 drinks/week? -     Reviewed orders with patient.  Reviewed health maintenance and updated orders accordingly - Yes  BP Readings from Last 3 Encounters:   09/14/22 122/89   06/17/22 (!) 148/100   04/30/22 (!) 144/100    Wt Readings from Last 3 Encounters:   09/14/22 68.9 kg (152 lb)   06/17/22 68.9 kg (152 lb)   04/30/22 71.2 kg (157 lb)                    Breast Cancer Screening:    Breast CA Risk Assessment (FHS-7) 3/31/2021   Do you have a family history of breast, colon, or ovarian cancer? No / Unknown       click delete button to remove this line now  Mammogram Screening: Recommended annual mammography  Pertinent mammograms are reviewed under the imaging tab.    History of abnormal Pap smear: NO - age 30-65 PAP every 5 years with negative HPV co-testing recommended  PAP / HPV Latest Ref Rng & Units 7/9/2018 4/13/2017 9/30/2015   PAP (Historical) - NIL NIL NIL   HPV16 NEG:Negative Negative Negative Negative   HPV18 NEG:Negative Negative Negative Negative   HRHPV NEG:Negative Negative Negative Negative     Reviewed and updated as needed this visit by clinical staff   Tobacco  Allergies  Meds   Med Hx  Surg Hx  Fam Hx  Soc Hx          Reviewed and updated as needed this visit by Provider                   Review of Systems   Constitutional: Negative for chills and fever.   HENT: Positive for hearing loss. Negative for congestion, ear pain and sore throat.    Eyes: Positive for visual disturbance. Negative for pain.   Respiratory: Negative for cough and shortness of breath.    Cardiovascular: Negative for chest pain, palpitations and peripheral edema.   Gastrointestinal: Positive for heartburn. Negative for abdominal pain, constipation, diarrhea, hematochezia and nausea.   Breasts:  Negative for tenderness, breast mass and discharge.  "  Genitourinary: Negative for dysuria, frequency, genital sores, hematuria, pelvic pain, urgency, vaginal bleeding and vaginal discharge.   Musculoskeletal: Positive for myalgias. Negative for arthralgias and joint swelling.   Skin: Positive for rash.   Neurological: Positive for dizziness and headaches. Negative for weakness and paresthesias.   Psychiatric/Behavioral: Negative for mood changes. The patient is nervous/anxious.      OBJECTIVE:   /89 (BP Location: Right arm)   Pulse 68   Temp 99.1  F (37.3  C) (Tympanic)   Resp 20   Ht 1.626 m (5' 4\")   Wt 68.9 kg (152 lb)   SpO2 98%   BMI 26.09 kg/m    Physical Exam  GENERAL APPEARANCE: healthy, alert and no distress  EYES: Eyes grossly normal to inspection, PERRL and conjunctivae and sclerae normal  HENT: ear canals and TM's normal, nose and mouth without ulcers or lesions, oropharynx clear and oral mucous membranes moist  NECK: no adenopathy, no asymmetry, masses, or scars and thyroid normal to palpation  RESP: lungs clear to auscultation - no rales, rhonchi or wheezes  CV: regular rate and rhythm, normal S1 S2, no S3 or S4, no murmur, click or rub, no peripheral edema and peripheral pulses strong  ABDOMEN: soft, nontender, no hepatosplenomegaly, no masses and bowel sounds normal   (female): normal female external genitalia, normal urethral meatus, vaginal mucosal atrophy noted, normal cervix, adnexae, and uterus without masses or abnormal discharge  MS: no musculoskeletal defects are noted and gait is age appropriate without ataxia  SKIN: no suspicious lesions or rashes  NEURO: Normal strength and tone, sensory exam grossly normal, mentation intact and speech normal  PSYCH: mentation appears normal and affect normal/bright    Diagnostic Test Results:  Labs reviewed in Epic    ASSESSMENT/PLAN:   (Z00.00) Routine general medical examination at a health care facility  (primary encounter diagnosis)  Plan: Pap screen with HPV - recommended age 30 - 65    "      years, HPV Hold (Lab Only)    (F41.0) Anxiety attack  Comment: improved but restart seroquel per her request  Plan: QUEtiapine (SEROQUEL) 25 MG tablet, Lipid panel        reflex to direct LDL Non-fasting    (F51.01) Idiopathic insomnia  Comment: refill stable.  Past provider with QT concern given seroquel and elavil but nothing found on interaction  today, seroquel low dose and EKG in Feb 2022  Plan: amitriptyline (ELAVIL) 100 MG tablet    (F10.21) Alcohol dependence in remission (H)  Comment: doing well   Plan: monitor    (R79.89) Elevated LFTs  Comment: mild  Plan: Karishma does not want this checked today, advised to monitor at [facility]    (L30.4) Intertrigo  Comment: in gluteal fold perianal.  Treat.  Plan: nystatin (MYCOSTATIN) 475714 UNIT/GM external         powder    (Z72.0) Tobacco abuse  Comment: improved, declines further treatment today  Plan: dicussed [facility] counseling as she is not interested in meds    (Z30.432) Encounter for IUD removal  Comment: Procedure discussed and verbal consent obtained.  Speculum placed in vagina, IUD strings grasped with ring forceps, and IUD removed on first try and without incident.  Patient tolerated procedure well.  Plan: Follicle stimulating hormone, REMOVE         INTRAUTERINE DEVICE    (N91.2) Absence of menstruation  Comment: confirm if menopausal or if needs contraceptive  Plan: Follicle stimulating hormone    (Z13.220) Lipid screening  Comment: seroquel monitoring  Plan: Lipid panel reflex to direct LDL Non-fasting    (Z23) Need for prophylactic vaccination and inoculation against influenza  Plan: INFLUENZA QUAD, RECOMBINANT, P-FREE (RIV4)         (FLUBLOK)    Patient Instructions   Labs   Do recommend checking liver labs sometime but decided not to today    Restarting seroquel - I don't have much concerns but check cholesterol and blood sugar at least yearly (every 6 months recommended)    Rechecking BP and pulse today   Pneumonia, flu and covid vaccine  today  In 2 months get new booster  Keep cutting back on Ecig - [facility]uit[facility]ner counseling free if desired     Good luck at [facility]   Preventive Health Recommendations  Female Ages 50 - 64    Yearly exam: See your health care provider every year in order to  o Review health changes.   o Discuss preventive care.    o Review your medicines if your doctor has prescribed any.      Get a Pap test every three years (unless you have an abnormal result and your provider advises testing more often).    If you get Pap tests with HPV test, you only need to test every 5 years, unless you have an abnormal result.     You do not need a Pap test if your uterus was removed (hysterectomy) and you have not had cancer.    You should be tested each year for STDs (sexually transmitted diseases) if you're at risk.     Have a mammogram every 1 to 2 years.    Have a colonoscopy at age 50, or have a yearly FIT test (stool test). These exams screen for colon cancer.      Have a cholesterol test every 5 years, or more often if advised.    Have a diabetes test (fasting glucose) every three years. If you are at risk for diabetes, you should have this test more often.     If you are at risk for osteoporosis (brittle bone disease), think about having a bone density scan (DEXA).    Shots: Get a flu shot each year. Get a tetanus shot every 10 years.    Nutrition:     Eat at least 5 servings of fruits and vegetables each day.    Eat whole-grain bread, whole-wheat pasta and brown rice instead of white grains and rice.    Get adequate Calcium and Vitamin D.     Lifestyle    Exercise at least 150 minutes a week (30 minutes a day, 5 days a week). This will help you control your weight and prevent disease.    Limit alcohol to one drink per day.    No smoking.     Wear sunscreen to prevent skin cancer.     See your dentist every six months for an exam and cleaning.    See your eye doctor every 1 to 2 years.      COUNSELING:  Reviewed preventive health  "counseling, as reflected in patient instructions    Estimated body mass index is 26.09 kg/m  as calculated from the following:    Height as of this encounter: 1.626 m (5' 4\").    Weight as of this encounter: 68.9 kg (152 lb).    Weight management plan: Discussed healthy diet and exercise guidelines    She reports that she has quit smoking. Her smoking use included cigarettes and other. She smoked 0.50 packs per day. She has never used smokeless tobacco.      Counseling Resources:  ATP IV Guidelines  Pooled Cohorts Equation Calculator  Breast Cancer Risk Calculator  BRCA-Related Cancer Risk Assessment: FHS-7 Tool  FRAX Risk Assessment  ICSI Preventive Guidelines  Dietary Guidelines for Americans, 2010  USDA's MyPlate  ASA Prophylaxis  Lung CA Screening    Dara Pope PA-C  [facility]  "

## 2022-09-16 LAB
BKR LAB AP GYN ADEQUACY: NORMAL
BKR LAB AP GYN INTERPRETATION: NORMAL
BKR LAB AP HPV REFLEX: NORMAL
BKR LAB AP PREVIOUS ABNORMAL: NORMAL
PATH REPORT.COMMENTS IMP SPEC: NORMAL
PATH REPORT.COMMENTS IMP SPEC: NORMAL
PATH REPORT.RELEVANT HX SPEC: NORMAL

## 2022-09-19 LAB
HUMAN PAPILLOMA VIRUS 16 DNA: NEGATIVE
HUMAN PAPILLOMA VIRUS 18 DNA: NEGATIVE
HUMAN PAPILLOMA VIRUS FINAL DIAGNOSIS: NORMAL
HUMAN PAPILLOMA VIRUS OTHER HR: NEGATIVE

## 2022-09-19 NOTE — RESULT ENCOUNTER NOTE
Please place any/all future orders discussed below.    Please notify patient --    Cholesterol looks great.  FSH lab shows that she is in menopause.    Good luck with her transition to [facility].

## 2022-09-27 ENCOUNTER — IMMUNIZATION (OUTPATIENT)
Dept: FAMILY MEDICINE | Facility: CLINIC | Age: 52
End: 2022-09-27
Payer: COMMERCIAL

## 2022-09-27 PROCEDURE — 91305 COVID-19,PF,PFIZER (12+ YRS): CPT

## 2022-09-27 PROCEDURE — 0052A COVID-19,PF,PFIZER (12+ YRS): CPT

## 2022-11-06 DIAGNOSIS — F51.01 IDIOPATHIC INSOMNIA: ICD-10-CM

## 2022-11-07 RX ORDER — AMITRIPTYLINE HYDROCHLORIDE 50 MG/1
100-150 TABLET ORAL AT BEDTIME
Qty: 270 TABLET | Refills: 0 | Status: SHIPPED | OUTPATIENT
Start: 2022-11-07 | End: 2023-02-03

## 2023-02-03 ENCOUNTER — VIRTUAL VISIT (OUTPATIENT)
Dept: FAMILY MEDICINE | Facility: CLINIC | Age: 53
End: 2023-02-03
Payer: MEDICAID

## 2023-02-03 DIAGNOSIS — F43.0 ACUTE REACTION TO STRESS: ICD-10-CM

## 2023-02-03 DIAGNOSIS — Z59.9 FINANCIAL DIFFICULTIES: ICD-10-CM

## 2023-02-03 DIAGNOSIS — F10.20 ALCOHOL USE DISORDER, MODERATE, DEPENDENCE (H): ICD-10-CM

## 2023-02-03 DIAGNOSIS — T14.91XA SUICIDE ATTEMPT (H): Primary | ICD-10-CM

## 2023-02-03 DIAGNOSIS — F39 MOOD DISORDER (H): ICD-10-CM

## 2023-02-03 DIAGNOSIS — G47.00 INSOMNIA, UNSPECIFIED TYPE: ICD-10-CM

## 2023-02-03 PROCEDURE — 96127 BRIEF EMOTIONAL/BEHAV ASSMT: CPT | Mod: VID | Performed by: PHYSICIAN ASSISTANT

## 2023-02-03 PROCEDURE — 99215 OFFICE O/P EST HI 40 MIN: CPT | Mod: VID | Performed by: PHYSICIAN ASSISTANT

## 2023-02-03 RX ORDER — QUETIAPINE FUMARATE 25 MG/1
25 TABLET, FILM COATED ORAL 3 TIMES DAILY PRN
Qty: 90 TABLET | Refills: 0 | Status: SHIPPED | OUTPATIENT
Start: 2023-02-03 | End: 2023-02-27

## 2023-02-03 RX ORDER — AMITRIPTYLINE HYDROCHLORIDE 50 MG/1
100 TABLET ORAL AT BEDTIME
Refills: 0 | COMMUNITY
Start: 2023-02-03

## 2023-02-03 SDOH — ECONOMIC STABILITY - INCOME SECURITY: PROBLEM RELATED TO HOUSING AND ECONOMIC CIRCUMSTANCES, UNSPECIFIED: Z59.9

## 2023-02-03 ASSESSMENT — ANXIETY QUESTIONNAIRES
GAD7 TOTAL SCORE: 12
IF YOU CHECKED OFF ANY PROBLEMS ON THIS QUESTIONNAIRE, HOW DIFFICULT HAVE THESE PROBLEMS MADE IT FOR YOU TO DO YOUR WORK, TAKE CARE OF THINGS AT HOME, OR GET ALONG WITH OTHER PEOPLE: NOT DIFFICULT AT ALL
3. WORRYING TOO MUCH ABOUT DIFFERENT THINGS: NEARLY EVERY DAY
5. BEING SO RESTLESS THAT IT IS HARD TO SIT STILL: NOT AT ALL
1. FEELING NERVOUS, ANXIOUS, OR ON EDGE: NEARLY EVERY DAY
2. NOT BEING ABLE TO STOP OR CONTROL WORRYING: NEARLY EVERY DAY
GAD7 TOTAL SCORE: 12
7. FEELING AFRAID AS IF SOMETHING AWFUL MIGHT HAPPEN: NOT AT ALL
6. BECOMING EASILY ANNOYED OR IRRITABLE: NOT AT ALL

## 2023-02-03 ASSESSMENT — PATIENT HEALTH QUESTIONNAIRE - PHQ9
SUM OF ALL RESPONSES TO PHQ QUESTIONS 1-9: 13
5. POOR APPETITE OR OVEREATING: NEARLY EVERY DAY

## 2023-02-03 NOTE — PATIENT INSTRUCTIONS
"1) amitriptyline decrease from 150 mg to 100 mg   2) start seroquel again  TODAY - see directions on bottle.    3) suggest calling crisis line  4) Friend to come over right away  Make house safer - see if friend can stay for awhile until you feel better, remove access to pills/old pills/daughter's pills etc, have friend keep any bigger pill supplies so that you just have access to a few days, consider having friend keep guns even though you have gun locks that you don't know how to work, calling crisis lines if needed.  5) Medical assistant will call to check on you Monday morning.  Let her know if you are safe or if need appt with me on Monday.  6)Care coordinator will call you to talk thru process of food stamps, financial assistance, etc - may also know more about trying to file for 's social security AND about getting health insurance ([place] Sure etc)  7) Set up with psychiatry - [place] will call  Also can look outside of [employer] via [employer] or [email or web address]  Meanwhile KEEP the [facility] psychiatry appt on 2/8    Discussed need for counseling and psychiatry long term  If nothing else you need it in case of needing to file for disability    For mental health emergency/crisis: Call 911, call police, be seen in emergency room, or consider below options:  -[facility] crisis line - [phone]  OR [place] Crisis Line 988  -Odbm3Ymzg: text LIFE to 53125 for immediate support and crisis intervention for [place] residents that can help with relationship, mental health, and suicide struggles.   -Text \"[place]\" to 313350      Medication disposal options in [facility]:  [facility], [address] [facility], [address], lower level [facility] [place] [phone]  [facility] depository, [address], lower level [phone] or [address],  [facility] Police Department, " [address], [facility] Police Department, [address], [place]  Hours generally M-F, 8-4:30

## 2023-02-03 NOTE — PROGRESS NOTES
Karishma is a 52 year old who is being evaluated via a billable video visit.      How would you like to obtain your AVS? Mail a copy  If the video visit is dropped, the invitation should be resent by: Text to cell phone: [phone]  Will anyone else be joining your video visit? No    Assessment & Plan   (T14.91XA) Suicide attempt (H)  (primary encounter diagnosis)  (F39) Mood disorder (H)  (F43.0) Acute reaction to stress  Comment: baseline mood disorder - r/o schizoaffective however Karishma rarely wants to discuss details of mental health - with PTSD and now severe stress with  passing and leaving severe financial distress.  Resume seroquel which has been most helpful in past.  She defers selective serotonin reuptake inhibitor/SNRI today. Agreeable to start counseling and psychiatry.  She believes she will need to go on disability and we discussed need for solid trail of seeing psychiatric professionals  Plan: quetiapine 50 mg, Primary Care - Care Coordination Referral,         Adult Mental Health  Referral    (F10.20) Alcohol use disorder, moderate, dependence (H)  Comment: ongoing alcohol abuse   Plan: defer treatment today to work on mood stabilization    (G47.00) Insomnia, unspecified type  Comment: wean down  Plan: amitriptyline (ELAVIL) 50 MG tablet    (Z59.9) Financial difficulties  Plan: Primary Care - Care Coordination Referral    I spent a total of 49 minutes on the day of the visit.   Time spent doing chart review, history and exam, documentation and further activities per the note    Depression Screening Follow Up    PHQ 2/3/2023   PHQ-9 Total Score 13   Q9: Thoughts of better off dead/self-harm past 2 weeks Nearly every day   F/U: Thoughts of suicide or self-harm -   F/U: Self harm-plan -   F/U: Self-harm action -   F/U: Safety concerns -     Follow Up      Follow Up Actions Taken  Crisis resource information provided in the After Visit Summary  Referred patient back to mental health  provider  Mental Health Referral placed  She tells me she is very confident that she can stay safe.  Won't commit suicide due to not wanting to abandon kids, ashamed to commit suicide, and because friends Constance and Rebecca are closely checking in on her and able to be called.  Promised to call crisis line, has someone to  prescription, has upcoming psych appt 2/8    Discussed the following ways the patient can remain in a safe environment:  secure medications, dispose of old medications, remove access to firearms, be around others     Patient Instructions   1) amitriptyline decrease from 150 mg to 100 mg   2) start seroquel again  TODAY - see directions on bottle.    3) suggest calling crisis line  4) Friend to come over right away  Make house safer - see if friend can stay for awhile until you feel better, remove access to pills/old pills/daughter's pills etc, have friend keep any bigger pill supplies so that you just have access to a few days, consider having friend keep guns even though you have gun locks that you don't know how to work, calling crisis lines if needed.  5) Medical assistant will call to check on you Monday morning.  Let her know if you are safe or if need appt with me on Monday.  6)Care coordinator will call you to talk thru process of food stamps, financial assistance, etc - may also know more about trying to file for 's social security AND about getting health insurance ([place] Sure etc)  7) Set up with psychiatry - [place] will call  Also can look outside of [place] via [employer] or [email or web address]  Meanwhile KEEP the [facility] psychiatry appt on 2/8    Discussed need for counseling and psychiatry long term  If nothing else you need it in case of needing to file for disability    For mental health emergency/crisis: Call 911, call police, be seen in emergency room, or consider below options:  -[place]/[facility] crisis line -  "[phone]  OR [place] Crisis Line 988  -Hknq2Pprl: text LIFE to 69024 for immediate support and crisis intervention for [place] residents that can help with relationship, mental health, and suicide struggles.   -Text \"[place]\" to 854421      Medication disposal options in [facility]:  [facility], [address] [facility], [address], lower level [facility] [place] [phone]  [facility] depository, [address], lower level [phone] or [address],  [facility] Police Department, [address], [facility] Police [facility], [address] [facility], [place]  Hours generally M-F, 8-4:30             No follow-ups on file.    Dara Pope PA-C  [facility]    Xochilt Schwarz is a 52 year old presenting for the following health issues:  Depression and Anxiety    HPI     Depression and Anxiety Follow-Up    How are you doing with your depression since your last visit? worsened    How are you doing with your anxiety since your last visit?  Worsened     Are you having other symptoms that might be associated with depression or anxiety? No    Have you had a significant life event? Financial Concerns and Grief or Loss     Do you have any concerns with your use of alcohol or other drugs? Yes:  alcohol    Started drinking again - was only sleeping until 3 am until added alcohol back about a year ago.    Suicide attempt 3 wks before her   on .  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  Strongly feels one of therapists was following her.  Boss doesn't believe her on this.  So she quit working.    Last night felt overwhelmed and she took a pint of vodka plus 3 days worth of her daughters pills.  Today she feels she will not harm self.  Feels like can't breath, extremely vulnerable.  Feels she will not attempt " to commit suicide anymore because if she had , she would have abandoned kids.  Ashamed .      Feels faint when she hyperventilates - several times a day. Watching tv helps.      Typically drinking about a pint of vodka a night since her  .      Dissociative disorder diagnosed age 32.    Other suicide attempts - age 16 - took all the sudafed type meds.  Has history of other suicide attempts, unclear number of attempts.  She also had goal of not waking up when she was drinking, would wake up mad that she was still alive, and start again at 6 AM.  Sober since 2017, after treatment at [facility].      EMDR x 5 in past.    Social History     Tobacco Use     Smoking status: Former     Packs/day: 0.50     Types: Cigarettes, Other     Smokeless tobacco: Never     Tobacco comments:     Does JUUL e cig    Vaping Use     Vaping Use: Every day     Substances: Nicotine     Devices: Pre-filled pod   Substance Use Topics     Alcohol use: No     Alcohol/week: 0.0 standard drinks     Drug use: No     PHQ 2021   PHQ-9 Total Score 5 5 10   Q9: Thoughts of better off dead/self-harm past 2 weeks Several days Several days Several days   F/U: Thoughts of suicide or self-harm No Yes Yes   F/U: Self harm-plan - No No   F/U: Self-harm action - No No   F/U: Safety concerns No No No   Some encounter information is confidential and restricted. Go to Review [employer] activity to see all data.     RALEIGH-7 SCORE 2019 3/15/2021 2021   Total Score - - -   Total Score 1 (minimal anxiety) 2 (minimal anxiety) 5 (mild anxiety)   Total Score 1 2 5   Total Score - - -   Some encounter information is confidential and restricted. Go to Review [employer] activity to see all data.     Last PHQ-9 2/3/2023   1.  Little interest or pleasure in doing things 3   2.  Feeling down, depressed, or hopeless 3   3.  Trouble falling or staying asleep, or sleeping too much 0   4.  Feeling tired or having little energy 0    5.  Poor appetite or overeating 0   6.  Feeling bad about yourself 1   7.  Trouble concentrating 3   8.  Moving slowly or restless 0   Q9: Thoughts of better off dead/self-harm past 2 weeks 3   PHQ-9 Total Score 13   Difficulty at work, home, or with people Extremely dIfficult   In the past two weeks have you had thoughts of suicide or self harm? -   Do you have concerns about your personal safety or the safety of others? -   In the past 2 weeks have you thought about a plan or had intention to harm yourself? -   In the past 2 weeks have you acted on these thoughts in any way? -   Some encounter information is confidential and restricted. Go to Review [employer] activity to see all data.     RALEIGH-7  2/3/2023   1. Feeling nervous, anxious, or on edge 3   2. Not being able to stop or control worrying 3   3. Worrying too much about different things 3   4. Trouble relaxing 3   5. Being so restless that it is hard to sit still 0   6. Becoming easily annoyed or irritable 0   7. Feeling afraid, as if something awful might happen 0   RALEIGH-7 Total Score 12   If you checked any problems, how difficult have they made it for you to do your work, take care of things at home, or get along with other people? Not difficult at all   Some encounter information is confidential and restricted. Go to Review [employer] activity to see all data.       Suicide Assessment Five-step Evaluation and Treatment (SAFE-T)      How many servings of fruits and vegetables do you eat daily?  0-1    On average, how many sweetened beverages do you drink each day (Examples: soda, juice, sweet tea, etc.  Do NOT count diet or artificially sweetened beverages)?   0    How many days per week do you exercise enough to make your heart beat faster? 3 or less    How many minutes a day do you exercise enough to make your heart beat faster? 9 or less    How many days per week do you miss taking your medication? 0      Objective           Vitals:  No vitals were  obtained today due to virtual visit.      Video-Visit Details    Type of service:  Video Visit   Video time to 3:29  Originating Location (pt. Location): Home    Distant Location (provider location):  On-site  Platform used for Video Visit: [employer]

## 2023-02-03 NOTE — NURSING NOTE
"Chief Complaint   Patient presents with     Depression     Anxiety       Initial There were no vitals taken for this visit. Estimated body mass index is 26.09 kg/m  as calculated from the following:    Height as of 9/14/22: 1.626 m (5' 4\").    Weight as of 9/14/22: 68.9 kg (152 lb).    Patient presents to the clinic using No DME    Is there anyone who you would like to be able to receive your results? No  If yes have patient fill out MACEY    "

## 2023-02-06 ENCOUNTER — VIRTUAL VISIT (OUTPATIENT)
Dept: FAMILY MEDICINE | Facility: CLINIC | Age: 53
End: 2023-02-06
Payer: MEDICAID

## 2023-02-06 ENCOUNTER — PATIENT OUTREACH (OUTPATIENT)
Dept: CARE COORDINATION | Facility: CLINIC | Age: 53
End: 2023-02-06

## 2023-02-06 DIAGNOSIS — F10.21 ALCOHOL DEPENDENCE IN REMISSION (H): ICD-10-CM

## 2023-02-06 DIAGNOSIS — F43.0 ACUTE REACTION TO STRESS: Primary | ICD-10-CM

## 2023-02-06 DIAGNOSIS — F41.9 ANXIETY: ICD-10-CM

## 2023-02-06 PROCEDURE — 99214 OFFICE O/P EST MOD 30 MIN: CPT | Mod: 95 | Performed by: PHYSICIAN ASSISTANT

## 2023-02-06 ASSESSMENT — ANXIETY QUESTIONNAIRES
GAD7 TOTAL SCORE: 6
GAD7 TOTAL SCORE: 6
1. FEELING NERVOUS, ANXIOUS, OR ON EDGE: NEARLY EVERY DAY
5. BEING SO RESTLESS THAT IT IS HARD TO SIT STILL: NOT AT ALL
6. BECOMING EASILY ANNOYED OR IRRITABLE: NEARLY EVERY DAY
7. FEELING AFRAID AS IF SOMETHING AWFUL MIGHT HAPPEN: NOT AT ALL
3. WORRYING TOO MUCH ABOUT DIFFERENT THINGS: NOT AT ALL
2. NOT BEING ABLE TO STOP OR CONTROL WORRYING: NOT AT ALL
IF YOU CHECKED OFF ANY PROBLEMS ON THIS QUESTIONNAIRE, HOW DIFFICULT HAVE THESE PROBLEMS MADE IT FOR YOU TO DO YOUR WORK, TAKE CARE OF THINGS AT HOME, OR GET ALONG WITH OTHER PEOPLE: EXTREMELY DIFFICULT

## 2023-02-06 ASSESSMENT — PATIENT HEALTH QUESTIONNAIRE - PHQ9
SUM OF ALL RESPONSES TO PHQ QUESTIONS 1-9: 21
5. POOR APPETITE OR OVEREATING: NOT AT ALL

## 2023-02-06 NOTE — PROGRESS NOTES
Karishma is a 52 year old who is being evaluated via a billable video visit.      How would you like to obtain your AVS? MyChart  If the video visit is dropped, the invitation should be resent by: Text to cell phone: [phone]  Will anyone else be joining your video visit? No    Assessment & Plan     Acute reaction to stress  Anxiety  Alcohol dependence in remission (H)  Markedly improved from Friday.  Reminded she can take 4th tab of seroquel, andto contact me if still not sleeping as dose can be raised.  Declines need for medication to help with sobriety.  Reminded of crisis line.  Feels safe to get to psychiatry appt on 2/8th.  Anticipate psychiatry taking over therefore follow up me is simply prn.      Depression Screening Follow Up    PHQ 2/6/2023   PHQ-9 Total Score 21   Q9: Thoughts of better off dead/self-harm past 2 weeks Nearly every day   F/U: Thoughts of suicide or self-harm -   F/U: Self harm-plan -   F/U: Self-harm action -   F/U: Safety concerns -       No follow-ups on file.    Dara Pope PA-C  [facility]    Subjective   Karishma is a 52 year old, presenting for the following health issues:  Depression (Follow up )    HPI     Depression and Anxiety Follow-Up    How are you doing with your depression since your last visit? No change    How are you doing with your anxiety since your last visit?  No change    Are you having other symptoms that might be associated with depression or anxiety? No    Have you had a significant life event? Grief or Loss     Do you have any concerns with your use of alcohol or other drugs? Yes:  going through withdrawls now     Weekend went ok.  Was angry and frustrated, kept busy cleaning/sorting.  Friend stayed until 1:30 AM Sat morning and helped her with filing for social security from her late , also applied for MA.  Spoke with county today - will be getting food assist, and further help if needed after she pays mortgage. Mood  "has felt better except for \"pure rage\" towards late .  Owes 33k in taxes in next yr, learned he cashed out his 401k, and had line of home equity on which she knew nothing, 66k of credit card debt. Yesterday had a day with panic, not really panic attack.  Used an old temazepam yesterday.  Last alcohol was on 2/2 overnight into 2/3.  Anger overriding anything else.  No tremor.  No sweating.  No nausea or vomiting.  No auditory visual disturbances.  No new disorientation - has felt confused for some time.  No physical cravings for alcohol.  \"The chou is in the brain, but nothing like in the past\".  Saving her seroquel to bedtime.  Feels ok to get to her psych appt later this week.  Mostly just cleaning, but plans to consider some yoga or exercise.      Social History     Tobacco Use     Smoking status: Former     Packs/day: 0.50     Types: Cigarettes, Other     Smokeless tobacco: Never     Tobacco comments:     Does JUUL e cig    Vaping Use     Vaping Use: Every day     Substances: Nicotine     Devices: Pre-filled pod   Substance Use Topics     Alcohol use: No     Alcohol/week: 0.0 standard drinks     Drug use: No     PHQ 6/17/2022 9/14/2022 2/3/2023   PHQ-9 Total Score 5 10 13   Q9: Thoughts of better off dead/self-harm past 2 weeks Several days Several days Nearly every day   F/U: Thoughts of suicide or self-harm Yes Yes -   F/U: Self harm-plan No No -   F/U: Self-harm action No No -   F/U: Safety concerns No No -   Some encounter information is confidential and restricted. Go to Review [employer] activity to see all data.     RALEIGH-7 SCORE 3/15/2021 11/18/2021 2/3/2023   Total Score - - -   Total Score 2 (minimal anxiety) 5 (mild anxiety) -   Total Score 2 5 12   Total Score - - -   Some encounter information is confidential and restricted. Go to Review [employer] activity to see all data.     Last PHQ-9 2/6/2023   1.  Little interest or pleasure in doing things 3   2.  Feeling down, depressed, or hopeless 3 "   3.  Trouble falling or staying asleep, or sleeping too much 1   4.  Feeling tired or having little energy 3   5.  Poor appetite or overeating 0   6.  Feeling bad about yourself 3   7.  Trouble concentrating 3   8.  Moving slowly or restless 2   Q9: Thoughts of better off dead/self-harm past 2 weeks 3   PHQ-9 Total Score 21   Difficulty at work, home, or with people Extremely dIfficult   In the past two weeks have you had thoughts of suicide or self harm? -   Do you have concerns about your personal safety or the safety of others? -   In the past 2 weeks have you thought about a plan or had intention to harm yourself? -   In the past 2 weeks have you acted on these thoughts in any way? -   Some encounter information is confidential and restricted. Go to Review InMyShow activity to see all data.     RALEIGH-7  2/6/2023   1. Feeling nervous, anxious, or on edge 3   2. Not being able to stop or control worrying 0   3. Worrying too much about different things 0   4. Trouble relaxing 0   5. Being so restless that it is hard to sit still 0   6. Becoming easily annoyed or irritable 3   7. Feeling afraid, as if something awful might happen 0   RALEIGH-7 Total Score 6   If you checked any problems, how difficult have they made it for you to do your work, take care of things at home, or get along with other people? Extremely difficult   Some encounter information is confidential and restricted. Go to Review InMyShow activity to see all data.       Suicide Assessment Five-step Evaluation and Treatment (SAFE-T)      How many servings of fruits and vegetables do you eat daily?  4 or more    On average, how many sweetened beverages do you drink each day (Examples: soda, juice, sweet tea, etc.  Do NOT count diet or artificially sweetened beverages)?   2    How many days per week do you exercise enough to make your heart beat faster? 7    How many minutes a day do you exercise enough to make your heart beat faster? 30 - 60    How many  days per week do you miss taking your medication? 0      Objective           Vitals:  No vitals were obtained today due to virtual visit.          Video-Visit Details    Type of service:  Video Visit     Originating Location (pt. Location): Home    Distant Location (provider location):  On-site  Platform used for Video Visit: Unable to complete video visit - therefore done as phone visit  Tele time 15 min.

## 2023-02-06 NOTE — LETTER
[facility] CARE COORDINATION  [facility]  [address] [place]  [address]    February 7, 2023    Karishma Kimball  [address] [facility] [address]      Dear Karishma,    I am a clinic community health worker who works with Dara Pope PA-C with the [facility]. I wanted to introduce myself and provide you with my contact information so you may call me at your convenience with any support or resource needs.  Below is a description of clinic care coordination and how our team can further assist you.       The clinic care coordination team is made up of a registered nurse, , financial resource worker and community health worker who understand the health care system. The goal of clinic care coordination is to help you manage your health and improve access to the health care system. Our team works alongside your provider to assist you in determining your health and social needs. We can help you obtain health care and community resources, providing you with necessary information and education. We can work with you through any barriers and develop a care plan that helps coordinate and strengthen the communication between you and your care team.    Please feel free to contact me regarding care coordination and what we can offer.      Sincerely,       Coby FU  Community Health Worker  [facility] Care Coordination  [facility]  [email or web address]   Office: [phone]

## 2023-02-06 NOTE — PROGRESS NOTES
Clinic Care Coordination Contact  [facility]/Voicemail    Reason for Referral: Financial Support   Financial Support: Other - Use Comments   Additional Information:  just unexpectedly , she has no income to support family, needs help with financial stabilization/food/rent/etc      Clinical Data: Care Coordination Outreach  Outreach attempted x 1.  Left message on patient's voicemail with call back information and requested return call.  Plan: CHW will try to reach patient again in 1-2 business days.    Coby FU  Community Health Worker  [facility]  Clinic Care Coordination  [facility]  [email or web address]   Office: [phone]

## 2023-02-07 NOTE — PROGRESS NOTES
Clinic Care Coordination Contact  [facility]/Voicemail     Clinical Data: Care Coordination Outreach  Outreach attempted x 2.  Left message on patient's voicemail with call back information and requested return call.  Plan: CHW will send care coordination introduction letter with contact information and explanation of care coordination services via mail. CHW will do no further outreaches at this time.    Coby FU  Community Health Worker  [facility] Care Coordination  [facility]  [email or web address]   Office: [phone]
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
## 2023-10-01 DIAGNOSIS — F51.01 IDIOPATHIC INSOMNIA: ICD-10-CM

## 2023-10-02 RX ORDER — AMITRIPTYLINE HYDROCHLORIDE 100 MG/1
100 TABLET ORAL AT BEDTIME
Qty: 90 TABLET | Refills: 3 | OUTPATIENT
Start: 2023-10-02

## 2023-11-08 ENCOUNTER — TELEPHONE (OUTPATIENT)
Dept: FAMILY MEDICINE | Facility: CLINIC | Age: 53
End: 2023-11-08

## 2023-11-08 NOTE — TELEPHONE ENCOUNTER
Patient Quality Outreach    Patient is due for the following:   Depression  -  Depression follow-up visit    Next Steps:   No follow up needed at this time.    Type of outreach:    Pt sees psych. Per MG no need to follow up with us       Questions for provider review:    None           Elle Garcia, CMA

## 2024-06-10 ENCOUNTER — OFFICE VISIT (OUTPATIENT)
Dept: FAMILY MEDICINE | Facility: CLINIC | Age: 54
End: 2024-06-10
Payer: COMMERCIAL

## 2024-06-10 VITALS
SYSTOLIC BLOOD PRESSURE: 124 MMHG | WEIGHT: 145 LBS | BODY MASS INDEX: 24.75 KG/M2 | HEART RATE: 88 BPM | RESPIRATION RATE: 20 BRPM | HEIGHT: 64 IN | DIASTOLIC BLOOD PRESSURE: 88 MMHG | TEMPERATURE: 98 F | OXYGEN SATURATION: 98 %

## 2024-06-10 DIAGNOSIS — Z13.0 SCREENING FOR DEFICIENCY ANEMIA: ICD-10-CM

## 2024-06-10 DIAGNOSIS — K64.4 EXTERNAL HEMORRHOIDS: Primary | ICD-10-CM

## 2024-06-10 DIAGNOSIS — Z01.84 IMMUNITY STATUS TESTING: ICD-10-CM

## 2024-06-10 LAB
FASTING STATUS PATIENT QL REPORTED: YES
GLUCOSE SERPL-MCNC: 96 MG/DL (ref 70–99)

## 2024-06-10 PROCEDURE — 90471 IMMUNIZATION ADMIN: CPT | Performed by: NURSE PRACTITIONER

## 2024-06-10 PROCEDURE — 86706 HEP B SURFACE ANTIBODY: CPT | Performed by: NURSE PRACTITIONER

## 2024-06-10 PROCEDURE — 99213 OFFICE O/P EST LOW 20 MIN: CPT | Mod: 25 | Performed by: NURSE PRACTITIONER

## 2024-06-10 PROCEDURE — 90651 9VHPV VACCINE 2/3 DOSE IM: CPT | Performed by: NURSE PRACTITIONER

## 2024-06-10 PROCEDURE — 82947 ASSAY GLUCOSE BLOOD QUANT: CPT | Performed by: NURSE PRACTITIONER

## 2024-06-10 PROCEDURE — 36415 COLL VENOUS BLD VENIPUNCTURE: CPT | Performed by: NURSE PRACTITIONER

## 2024-06-10 RX ORDER — RAMELTEON 8 MG/1
8 TABLET ORAL AT BEDTIME
COMMUNITY
Start: 2024-03-18

## 2024-06-10 ASSESSMENT — PATIENT HEALTH QUESTIONNAIRE - PHQ9
10. IF YOU CHECKED OFF ANY PROBLEMS, HOW DIFFICULT HAVE THESE PROBLEMS MADE IT FOR YOU TO DO YOUR WORK, TAKE CARE OF THINGS AT HOME, OR GET ALONG WITH OTHER PEOPLE: SOMEWHAT DIFFICULT
SUM OF ALL RESPONSES TO PHQ QUESTIONS 1-9: 5
SUM OF ALL RESPONSES TO PHQ QUESTIONS 1-9: 5

## 2024-06-10 ASSESSMENT — PAIN SCALES - GENERAL: PAINLEVEL: NO PAIN (0)

## 2024-06-10 NOTE — PROGRESS NOTES
Assessment & Plan     External hemorrhoids  Discussed with patient that hemorrhoids are not typically surgically removed unless significant complications are present. She would like to proceed with discussing with a surgeon. Her hemorrhoids are not overly bothersome but will bleed occasionally.   - Adult General Surg Referral; Future    Screening for deficiency anemia  - Glucose; Future  - Glucose    Immunity status testing  - Hepatitis B Surface Antibody; Future  - Hepatitis B Surface Antibody                Xochilt Schwarz is a 53 year old, presenting for the following health issues:  Hemorrhoids        6/10/2024     1:45 PM   Additional Questions   Roomed by Gretel MCCARTY     History of Present Illness       Reason for visit:  Hemorrhoids and HPV vaccine    She eats 4 or more servings of fruits and vegetables daily.She consumes 0 sweetened beverage(s) daily.She exercises with enough effort to increase her heart rate 60 or more minutes per day.  She exercises with enough effort to increase her heart rate 6 days per week.   She is taking medications regularly.       Hemorrhoids  Onset/Duration: ongoing since 2009  Description:   Monse-anal lump: YES  Pain: YES- occassional  Itching: No  Accompanying Signs & Symptoms:  Blood in stool: YES- sometimes   Changes in stool pattern: No  History:   Any previous GI studies done:none  Family History of colon cancer: YES- nephew-- new diagnosis.   Precipitating factors:   None  Alleviating factors:  None  Therapies tried and outcome: none, would like to discuss getting them removed   She is embarrassed by her hemorrhoids and nervous about being sexually active again with them. She is  and has not had a new partner yet.  Symptoms of hemorrhoids are intermittent.       Review of Systems  Constitutional, HEENT, cardiovascular, pulmonary, gi and gu systems are negative, except as otherwise noted.      Objective    /88   Pulse 88   Temp 98  F (36.7  C) (Tympanic)    "Resp 20   Ht 1.626 m (5' 4\")   Wt 65.8 kg (145 lb)   LMP  (LMP Unknown)   SpO2 98%   BMI 24.89 kg/m    Body mass index is 24.89 kg/m .    Physical Exam   GENERAL: alert and no distress  RECTAL (female): normal sphincter tone, no rectal masses. 3-4 hemorrhoidal skin tags are present. Not currently enlarged or inflamed.             Signed Electronically by: MONIQUE Amin CNP    "

## 2024-06-10 NOTE — LETTER
June 13, 2024      Karishma ARUN Connor Kimball  [address] [place] [address]        Dear Ms.Jones Kimball,    We are writing to inform you of your test results.    You are immune to Hep B and do not require vaccination. Glucose is stable- you are not diabetic.     Resulted Orders   Glucose   Result Value Ref Range    Glucose 96 70 - 99 mg/dL    Patient Fasting > 8hrs? Yes    Hepatitis B Surface Antibody   Result Value Ref Range    Hepatitis B Surface Antibody Reactive       Comment:      A reactive result indicates recovery from acute or chronic hepatitis B virus (HBV) infection or acquired immunity from HBV vaccination. This assay does not differentiate between a vaccine-induced immune response and an immune response induced by infection with HBV. A positive total antihepatitis B core result would indicate that the hepatitis B surface antibody response is due to past HBV infection.    Hepatitis B Surface Antibody Instrument Value 42.90 <8.5 m[IU]/mL       If you have any questions or concerns, please call the clinic at the number listed above.       Sincerely,      MONIQUE Nunez CNP

## 2024-06-10 NOTE — NURSING NOTE
Prior to immunization administration, verified patients identity using patient s name and date of birth. Please see Immunization Activity for additional information.     Screening Questionnaire for Adult Immunization    Are you sick today?   No   Do you have allergies to medications, food, a vaccine component or latex?   No   Have you ever had a serious reaction after receiving a vaccination?   No   Do you have a long-term health problem with heart, lung, kidney, or metabolic disease (e.g., diabetes), asthma, a blood disorder, no spleen, complement component deficiency, a cochlear implant, or a spinal fluid leak?  Are you on long-term aspirin therapy?   No   Do you have cancer, leukemia, HIV/AIDS, or any other immune system problem?   No   Do you have a parent, brother, or sister with an immune system problem?   No   In the past 3 months, have you taken medications that affect  your immune system, such as prednisone, other steroids, or anticancer drugs; drugs for the treatment of rheumatoid arthritis, Crohn s disease, or psoriasis; or have you had radiation treatments?   No   Have you had a seizure, or a brain or other nervous system problem?   No   During the past year, have you received a transfusion of blood or blood    products, or been given immune (gamma) globulin or antiviral drug?   No   For women: Are you pregnant or is there a chance you could become       pregnant during the next month?   No   Have you received any vaccinations in the past 4 weeks?   No     Immunization questionnaire answers were all negative.      Patient instructed to remain in clinic for 15 minutes afterwards, and to report any adverse reactions.     Screening performed by Gretel Sanches MA on 6/10/2024 at 2:22 PM.

## 2024-06-11 LAB
HBV SURFACE AB SERPL IA-ACNC: 42.9 M[IU]/ML
HBV SURFACE AB SERPL IA-ACNC: REACTIVE M[IU]/ML

## 2024-06-12 ENCOUNTER — TELEPHONE (OUTPATIENT)
Dept: FAMILY MEDICINE | Facility: CLINIC | Age: 54
End: 2024-06-12
Payer: COMMERCIAL

## 2024-06-12 NOTE — TELEPHONE ENCOUNTER
[facility] Call Center    Phone Message    May a detailed message be left on voicemail: yes     Reason for Call: Requesting Results     Name/type of test: LAB   Date of test: 6/10/24  Was test done at a location other than [facility] (Please fill in the location if not [facility])?: No    Action Taken: Other: [facility] PRIMARY [facility]ARE CLINIC POOL     Travel Screening: Not Applicable

## 2024-06-13 ENCOUNTER — TELEPHONE (OUTPATIENT)
Dept: FAMILY MEDICINE | Facility: CLINIC | Age: 54
End: 2024-06-13
Payer: COMMERCIAL

## 2024-06-13 NOTE — TELEPHONE ENCOUNTER
Patient returned call and was given lab results for glucose and hep B titer    Savanah Carranza RN on 6/13/2024 at 11:15 AM

## 2024-06-19 ENCOUNTER — OFFICE VISIT (OUTPATIENT)
Dept: SURGERY | Facility: CLINIC | Age: 54
End: 2024-06-19
Attending: NURSE PRACTITIONER
Payer: COMMERCIAL

## 2024-06-19 VITALS
HEART RATE: 74 BPM | HEIGHT: 64 IN | TEMPERATURE: 96.8 F | DIASTOLIC BLOOD PRESSURE: 80 MMHG | BODY MASS INDEX: 24.77 KG/M2 | WEIGHT: 145.06 LBS | SYSTOLIC BLOOD PRESSURE: 115 MMHG

## 2024-06-19 DIAGNOSIS — K64.4 EXTERNAL HEMORRHOIDS: ICD-10-CM

## 2024-06-19 DIAGNOSIS — K64.4 ANAL SKIN TAG: ICD-10-CM

## 2024-06-19 DIAGNOSIS — K60.2 ANAL FISSURE: Primary | ICD-10-CM

## 2024-06-19 PROCEDURE — 99243 OFF/OP CNSLTJ NEW/EST LOW 30: CPT | Performed by: SURGERY

## 2024-06-19 ASSESSMENT — PAIN SCALES - GENERAL: PAINLEVEL: NO PAIN (0)

## 2024-06-19 NOTE — PROGRESS NOTES
Assessment & Plan   Problem List Items Addressed This Visit    None  Visit Diagnoses       Anal fissure    -  Primary    External hemorrhoids        Anal skin tag               54 yo F with intermittent bleeding per rectum and irritation  -Pt does have a small anterior fissure; this may be the source of irritation and pain the past few weeks  -pt does have smaller circumferential external hemorrhoid and grade 2 internal hemorrhoids that does not look irritated or bleeding  -I recommended conservative management for now as below:     -ingest 20 to 30 g of insoluble fiber per day and drink plenty of water (1.5 to 2 liters per day). Both are necessary to produce regular, soft stools, which reduce straining at defecation. It could take six weeks to fully realize the beneficial effect of fiber   -refrain from straining or lingering (eg, reading) on the toilet.  - have regular physical exercise  - limit their intake of fatty foods and alcohol, which can exacerbate constipation   - Sitz baths can relieve irritation and pruritus as well as spasm of the anal sphincter muscles. Used with warm, rather than cold, water two to three times per day    -If pt continues to have irritation and bleeding after 4-6 weeks of conservative management, I would refer her colorectal surgeon for second opinion.   -All questions were answered.     Face to Face/patient Contact total time: 20 minutes  Pre Charting time: 5 minutes; Post charting time, communication and other activities: 10 minutes;   Total time:  35 minutes      No follow-ups on file.      Xochilt Schwarz is a 53 year old, presenting for the following health issues:  Consult (hemorrhoids)    Patient stated she has been dealing with external hemorrhoids for many years.  These are palpable.  They will intermittently bleed.  Been more troublesome lately.  Noted that if she sits for too long, this becomes more uncomfortable.  Also noted the symptoms will get provoked when she has  "softer bowel movements but multiple times daily.  She does not do anything over-the-counter for these yet.  Does notice some bleeding at times, not just on toilet paper but dripping blood in toilet  Some rectal pain but very intermittent.  Stated she does have daily bowel movements.  She does sit on the toilet for longer than 5 to 10 minutes to evacuate completely.  History of colonoscopy at age 48 and was normal at the time.  No family history of colon cancer except for her nephew.  Not on a blood thinner.  Does not take any fiber.  Does not put anything per rectum.             Review of Systems  Constitutional, neuro, ENT, endocrine, pulmonary, cardiac, gastrointestinal, genitourinary, musculoskeletal, integument and psychiatric systems are negative, except as otherwise noted.      Objective    /80 (BP Location: Left arm, Patient Position: Sitting, Cuff Size: Adult Regular)   Pulse 74   Temp 96.8  F (36  C) (Tympanic)   Ht 1.626 m (5' 4.02\")   Wt 65.8 kg (145 lb 1 oz)   LMP  (LMP Unknown)   BMI 24.89 kg/m    Body mass index is 24.89 kg/m .  Physical Exam  Vitals reviewed. Exam conducted with a chaperone present.   Eyes:      Conjunctiva/sclera: Conjunctivae normal.   Pulmonary:      Effort: Pulmonary effort is normal.   Abdominal:      Palpations: Abdomen is soft.   Genitourinary:         Comments: Anoscopy: stage 2 internal hemorrhoid - not bleeding; no irritation; no masses visible.  HOLLIE did not yield abnormality.   Musculoskeletal:         General: Normal range of motion.   Skin:     General: Skin is warm.      Capillary Refill: Capillary refill takes less than 2 seconds.   Neurological:      Mental Status: She is alert.   Psychiatric:         Mood and Affect: Mood normal.          Signed Electronically by: Elizabeth Colin MD    "

## 2024-06-19 NOTE — NURSING NOTE
"Initial /80 (BP Location: Left arm, Patient Position: Sitting, Cuff Size: Adult Regular)   Pulse 74   Temp 96.8  F (36  C) (Tympanic)   Ht 1.626 m (5' 4.02\")   Wt 65.8 kg (145 lb 1 oz)   LMP  (LMP Unknown)   BMI 24.89 kg/m   Estimated body mass index is 24.89 kg/m  as calculated from the following:    Height as of this encounter: 1.626 m (5' 4.02\").    Weight as of this encounter: 65.8 kg (145 lb 1 oz). .  Ramandeep Fabian MA    "

## 2024-06-19 NOTE — LETTER
6/19/2024      Karishma Kimball  [id number] [address] [place] [address]      Dear Colleague,    Thank you for referring your patient, Karishma Kimball, to the [facility]. Please see a copy of my visit note below.      Assessment & Plan   Problem List Items Addressed This Visit    None  Visit Diagnoses       Anal fissure    -  Primary    External hemorrhoids        Anal skin tag               52 yo F with intermittent bleeding per rectum and irritation  -Pt does have a small anterior fissure; this may be the source of irritation and pain the past few weeks  -pt does have smaller circumferential external hemorrhoid and grade 2 internal hemorrhoids that does not look irritated or bleeding  -I recommended conservative management for now as below:     -ingest 20 to 30 g of insoluble fiber per day and drink plenty of water (1.5 to 2 liters per day). Both are necessary to produce regular, soft stools, which reduce straining at defecation. It could take six weeks to fully realize the beneficial effect of fiber   -refrain from straining or lingering (eg, reading) on the toilet.  - have regular physical exercise  - limit their intake of fatty foods and alcohol, which can exacerbate constipation   - Sitz baths can relieve irritation and pruritus as well as spasm of the anal sphincter muscles. Used with warm, rather than cold, water two to three times per day    -If pt continues to have irritation and bleeding after 4-6 weeks of conservative management, I would refer her colorectal surgeon for second opinion.   -All questions were answered.     Face to Face/patient Contact total time: 20 minutes  Pre Charting time: 5 minutes; Post charting time, communication and other activities: 10 minutes;   Total time:  35 minutes      No follow-ups on file.      Xochilt Schwarz is a 53 year old, presenting for the following health issues:  Consult (hemorrhoids)    Patient stated she has been dealing with  "external hemorrhoids for many years.  These are palpable.  They will intermittently bleed.  Been more troublesome lately.  Noted that if she sits for too long, this becomes more uncomfortable.  Also noted the symptoms will get provoked when she has softer bowel movements but multiple times daily.  She does not do anything over-the-counter for these yet.  Does notice some bleeding at times, not just on toilet paper but dripping blood in toilet  Some rectal pain but very intermittent.  Stated she does have daily bowel movements.  She does sit on the toilet for longer than 5 to 10 minutes to evacuate completely.  History of colonoscopy at age 48 and was normal at the time.  No family history of colon cancer except for her nephew.  Not on a blood thinner.  Does not take any fiber.  Does not put anything per rectum.             Review of Systems  Constitutional, neuro, ENT, endocrine, pulmonary, cardiac, gastrointestinal, genitourinary, musculoskeletal, integument and psychiatric systems are negative, except as otherwise noted.      Objective    /80 (BP Location: Left arm, Patient Position: Sitting, Cuff Size: Adult Regular)   Pulse 74   Temp 96.8  F (36  C) (Tympanic)   Ht 1.626 m (5' 4.02\")   Wt 65.8 kg (145 lb 1 oz)   LMP  (LMP Unknown)   BMI 24.89 kg/m    Body mass index is 24.89 kg/m .  Physical Exam  Vitals reviewed. Exam conducted with a chaperone present.   Eyes:      Conjunctiva/sclera: Conjunctivae normal.   Pulmonary:      Effort: Pulmonary effort is normal.   Abdominal:      Palpations: Abdomen is soft.   Genitourinary:         Comments: Anoscopy: stage 2 internal hemorrhoid - not bleeding; no irritation; no masses visible.  HOLLIE did not yield abnormality.   Musculoskeletal:         General: Normal range of motion.   Skin:     General: Skin is warm.      Capillary Refill: Capillary refill takes less than 2 seconds.   Neurological:      Mental Status: She is alert.   Psychiatric:         Mood and " Affect: Mood normal.          Signed Electronically by: Elizabeth Colin MD        Again, thank you for allowing me to participate in the care of your patient.        Sincerely,        Elizabeth Colin MD

## 2024-08-12 ENCOUNTER — ALLIED HEALTH/NURSE VISIT (OUTPATIENT)
Dept: FAMILY MEDICINE | Facility: CLINIC | Age: 54
End: 2024-08-12
Payer: COMMERCIAL

## 2024-08-12 DIAGNOSIS — Z23 ENCOUNTER FOR IMMUNIZATION: Primary | ICD-10-CM

## 2024-08-12 PROCEDURE — 99207 PR NO CHARGE NURSE ONLY: CPT

## 2024-08-12 PROCEDURE — 90651 9VHPV VACCINE 2/3 DOSE IM: CPT

## 2024-08-12 PROCEDURE — 90471 IMMUNIZATION ADMIN: CPT

## 2024-08-12 NOTE — PROGRESS NOTES
Prior to immunization administration, verified patients identity using patient s name and date of birth. Please see Immunization Activity for additional information.     Screening Questionnaire for Adult Immunization    Are you sick today?   No   Do you have allergies to medications, food, a vaccine component or latex?   No   Have you ever had a serious reaction after receiving a vaccination?   No   Do you have a long-term health problem with heart, lung, kidney, or metabolic disease (e.g., diabetes), asthma, a blood disorder, no spleen, complement component deficiency, a cochlear implant, or a spinal fluid leak?  Are you on long-term aspirin therapy?   No   Do you have cancer, leukemia, HIV/AIDS, or any other immune system problem?   No   Do you have a parent, brother, or sister with an immune system problem?   No   In the past 3 months, have you taken medications that affect  your immune system, such as prednisone, other steroids, or anticancer drugs; drugs for the treatment of rheumatoid arthritis, Crohn s disease, or psoriasis; or have you had radiation treatments?   No   Have you had a seizure, or a brain or other nervous system problem?   No   During the past year, have you received a transfusion of blood or blood    products, or been given immune (gamma) globulin or antiviral drug?   No   For women: Are you pregnant or is there a chance you could become       pregnant during the next month?   No   Have you received any vaccinations in the past 4 weeks?   No     Immunization questionnaire answers were all negative.    I have reviewed the following standing orders:   This patient is due and qualifies for the HPV vaccine.    Click here for HPV (Adult 15-45Y) Standing Order     I have reviewed the vaccines inclusion and exclusion criteria;No concerns regarding eligibility.       Patient instructed to remain in clinic for 15 minutes afterwards, and to report any adverse reactions.     Screening performed by Liv  REYES Monroe, VIOLA on 8/12/2024 at 1:49 PM.

## 2024-10-31 ENCOUNTER — ANCILLARY PROCEDURE (OUTPATIENT)
Dept: GENERAL RADIOLOGY | Facility: CLINIC | Age: 54
End: 2024-10-31
Attending: NURSE PRACTITIONER
Payer: COMMERCIAL

## 2024-10-31 ENCOUNTER — OFFICE VISIT (OUTPATIENT)
Dept: FAMILY MEDICINE | Facility: CLINIC | Age: 54
End: 2024-10-31
Payer: COMMERCIAL

## 2024-10-31 ENCOUNTER — TELEPHONE (OUTPATIENT)
Dept: FAMILY MEDICINE | Facility: CLINIC | Age: 54
End: 2024-10-31

## 2024-10-31 VITALS
OXYGEN SATURATION: 98 % | BODY MASS INDEX: 26.22 KG/M2 | HEIGHT: 64 IN | RESPIRATION RATE: 18 BRPM | WEIGHT: 153.6 LBS | SYSTOLIC BLOOD PRESSURE: 130 MMHG | HEART RATE: 113 BPM | DIASTOLIC BLOOD PRESSURE: 78 MMHG | TEMPERATURE: 98.6 F

## 2024-10-31 DIAGNOSIS — I10 PRIMARY HYPERTENSION: ICD-10-CM

## 2024-10-31 DIAGNOSIS — Z79.899 MEDICATION MANAGEMENT: ICD-10-CM

## 2024-10-31 DIAGNOSIS — R07.81 RIB PAIN ON LEFT SIDE: ICD-10-CM

## 2024-10-31 DIAGNOSIS — G47.00 INSOMNIA, UNSPECIFIED TYPE: ICD-10-CM

## 2024-10-31 DIAGNOSIS — B36.9 FUNGAL RASH OF TORSO: ICD-10-CM

## 2024-10-31 DIAGNOSIS — Z00.00 ROUTINE GENERAL MEDICAL EXAMINATION AT A HEALTH CARE FACILITY: Primary | ICD-10-CM

## 2024-10-31 DIAGNOSIS — F51.3 SLEEP WALKING: ICD-10-CM

## 2024-10-31 LAB
ANION GAP SERPL CALCULATED.3IONS-SCNC: 9 MMOL/L (ref 7–15)
BUN SERPL-MCNC: 16.1 MG/DL (ref 6–20)
CALCIUM SERPL-MCNC: 9.6 MG/DL (ref 8.8–10.4)
CHLORIDE SERPL-SCNC: 102 MMOL/L (ref 98–107)
CREAT SERPL-MCNC: 0.85 MG/DL (ref 0.51–0.95)
EGFRCR SERPLBLD CKD-EPI 2021: 80 ML/MIN/1.73M2
GLUCOSE SERPL-MCNC: 107 MG/DL (ref 70–99)
HCO3 SERPL-SCNC: 29 MMOL/L (ref 22–29)
POTASSIUM SERPL-SCNC: 4.3 MMOL/L (ref 3.4–5.3)
SODIUM SERPL-SCNC: 140 MMOL/L (ref 135–145)

## 2024-10-31 PROCEDURE — 80048 BASIC METABOLIC PNL TOTAL CA: CPT | Performed by: NURSE PRACTITIONER

## 2024-10-31 PROCEDURE — 90673 RIV3 VACCINE NO PRESERV IM: CPT | Performed by: NURSE PRACTITIONER

## 2024-10-31 PROCEDURE — 91320 SARSCV2 VAC 30MCG TRS-SUC IM: CPT | Performed by: NURSE PRACTITIONER

## 2024-10-31 PROCEDURE — 36415 COLL VENOUS BLD VENIPUNCTURE: CPT | Performed by: NURSE PRACTITIONER

## 2024-10-31 PROCEDURE — 71101 X-RAY EXAM UNILAT RIBS/CHEST: CPT | Mod: TC | Performed by: STUDENT IN AN ORGANIZED HEALTH CARE EDUCATION/TRAINING PROGRAM

## 2024-10-31 PROCEDURE — 90472 IMMUNIZATION ADMIN EACH ADD: CPT | Performed by: NURSE PRACTITIONER

## 2024-10-31 PROCEDURE — 99213 OFFICE O/P EST LOW 20 MIN: CPT | Mod: 25 | Performed by: NURSE PRACTITIONER

## 2024-10-31 PROCEDURE — 93000 ELECTROCARDIOGRAM COMPLETE: CPT | Performed by: NURSE PRACTITIONER

## 2024-10-31 PROCEDURE — 90480 ADMN SARSCOV2 VAC 1/ONLY CMP: CPT | Performed by: NURSE PRACTITIONER

## 2024-10-31 PROCEDURE — 99396 PREV VISIT EST AGE 40-64: CPT | Mod: 25 | Performed by: NURSE PRACTITIONER

## 2024-10-31 RX ORDER — NYSTATIN 100000 U/G
CREAM TOPICAL 2 TIMES DAILY
Qty: 30 G | Refills: 0 | Status: SHIPPED | OUTPATIENT
Start: 2024-10-31

## 2024-10-31 SDOH — HEALTH STABILITY: PHYSICAL HEALTH: ON AVERAGE, HOW MANY DAYS PER WEEK DO YOU ENGAGE IN MODERATE TO STRENUOUS EXERCISE (LIKE A BRISK WALK)?: 5 DAYS

## 2024-10-31 ASSESSMENT — COLUMBIA-SUICIDE SEVERITY RATING SCALE - C-SSRS
1. WITHIN THE PAST MONTH, HAVE YOU WISHED YOU WERE DEAD OR WISHED YOU COULD GO TO SLEEP AND NOT WAKE UP?: YES
6. HAVE YOU EVER DONE ANYTHING, STARTED TO DO ANYTHING, OR PREPARED TO DO ANYTHING TO END YOUR LIFE?: YES, LIFETIME
2. IN THE PAST MONTH, HAVE YOU ACTUALLY HAD ANY THOUGHTS OF KILLING YOURSELF?: NO

## 2024-10-31 ASSESSMENT — SOCIAL DETERMINANTS OF HEALTH (SDOH): HOW OFTEN DO YOU GET TOGETHER WITH FRIENDS OR RELATIVES?: NEVER

## 2024-10-31 ASSESSMENT — PATIENT HEALTH QUESTIONNAIRE - PHQ9: SUM OF ALL RESPONSES TO PHQ QUESTIONS 1-9: 13

## 2024-10-31 ASSESSMENT — PAIN SCALES - GENERAL: PAINLEVEL_OUTOF10: NO PAIN (0)

## 2024-10-31 NOTE — PATIENT INSTRUCTIONS
Patient Education   Preventive Care Advice   This is general advice given by our system to help you stay healthy. However, your care team may have specific advice just for you. Please talk to your care team about your preventive care needs.  Nutrition  Eat 5 or more servings of fruits and vegetables each day.  Try wheat bread, brown rice and whole grain pasta (instead of white bread, rice, and pasta).  Get enough calcium and vitamin D. Check the label on foods and aim for 100% of the RDA (recommended daily allowance).  Lifestyle  Exercise at least 150 minutes each week  (30 minutes a day, 5 days a week).  Do muscle strengthening activities 2 days a week. These help control your weight and prevent disease.  No smoking.  Wear sunscreen to prevent skin cancer.  Have a dental exam and cleaning every 6 months.  Yearly exams  See your health care team every year to talk about:  Any changes in your health.  Any medicines your care team has prescribed.  Preventive care, family planning, and ways to prevent chronic diseases.  Shots (vaccines)   HPV shots (up to age 26), if you've never had them before.  Hepatitis B shots (up to age 59), if you've never had them before.  COVID-19 shot: Get this shot when it's due.  Flu shot: Get a flu shot every year.  Tetanus shot: Get a tetanus shot every 10 years.  Pneumococcal, hepatitis A, and RSV shots: Ask your care team if you need these based on your risk.  Shingles shot (for age 50 and up)  General health tests  Diabetes screening:  Starting at age 35, Get screened for diabetes at least every 3 years.  If you are younger than age 35, ask your care team if you should be screened for diabetes.  Cholesterol test: At age 39, start having a cholesterol test every 5 years, or more often if advised.  Bone density scan (DEXA): At age 50, ask your care team if you should have this scan for osteoporosis (brittle bones).  Hepatitis C: Get tested at least once in your life.  STIs (sexually  transmitted infections)  Before age 24: Ask your care team if you should be screened for STIs.  After age 24: Get screened for STIs if you're at risk. You are at risk for STIs (including HIV) if:  You are sexually active with more than one person.  You don't use condoms every time.  You or a partner was diagnosed with a sexually transmitted infection.  If you are at risk for HIV, ask about PrEP medicine to prevent HIV.  Get tested for HIV at least once in your life, whether you are at risk for HIV or not.  Cancer screening tests  Cervical cancer screening: If you have a cervix, begin getting regular cervical cancer screening tests starting at age 21.  Breast cancer scan (mammogram): If you've ever had breasts, begin having regular mammograms starting at age 40. This is a scan to check for breast cancer.  Colon cancer screening: It is important to start screening for colon cancer at age 45.  Have a colonoscopy test every 10 years (or more often if you're at risk) Or, ask your provider about stool tests like a FIT test every year or Cologuard test every 3 years.  To learn more about your testing options, visit:   .  For help making a decision, visit:   https://bit.ly/ie83755.  Prostate cancer screening test: If you have a prostate, ask your care team if a prostate cancer screening test (PSA) at age 55 is right for you.  Lung cancer screening: If you are a current or former smoker ages 50 to 80, ask your care team if ongoing lung cancer screenings are right for you.  For informational purposes only. Not to replace the advice of your health care provider. Copyright   2023 [facility]. All rights reserved. Clinically reviewed by the [facility] Transitions Program. [employer] [id number] - REV 01/24.  Preventing Falls: Care Instructions  Injuries and health problems such as trouble walking or poor eyesight can increase your risk of falling. So can some medicines. But there are things you can do to help  "prevent falls. You can exercise to get stronger. You can also arrange your home to make it safer.    Talk to your doctor about the medicines you take. Ask if any of them increase the risk of falls and whether they can be changed or stopped.   Try to exercise regularly. It can help improve your strength and balance. This can help lower your risk of falling.         Practice fall safety and prevention.   Wear low-heeled shoes that fit well and give your feet good support. Talk to your doctor if you have foot problems that make this hard.  Carry a cellphone or wear a medical alert device that you can use to call for help.  Use stepladders instead of chairs to reach high objects. Don't climb if you're at risk for falls. Ask for help, if needed.  Wear the correct eyeglasses, if you need them.        Make your home safer.   Remove rugs, cords, clutter, and furniture from walkways.  Keep your house well lit. Use night-lights in hallways and bathrooms.  Install and use sturdy handrails on stairways.  Wear nonskid footwear, even inside. Don't walk barefoot or in socks without shoes.        Be safe outside.   Use handrails, curb cuts, and ramps whenever possible.  Keep your hands free by using a shoulder bag or backpack.  Try to walk in well-lit areas. Watch out for uneven ground, changes in pavement, and debris.  Be careful in the winter. Walk on the grass or gravel when sidewalks are slippery. Use de-icer on steps and walkways. Add non-slip devices to shoes.    Put grab bars and nonskid mats in your shower or tub and near the toilet. Try to use a shower chair or bath bench when bathing.   Get into a tub or shower by putting in your weaker leg first. Get out with your strong side first. Have a phone or medical alert device in the bathroom with you.   Where can you learn more?  Go to https://www.[employer].net/patiented  Enter G117 in the search box to learn more about \"Preventing Falls: Care Instructions.\"  Current as of: " July 17, 2023  Content Version: 14.2 2024 [facility] [employer].   Care instructions adapted under license by your healthcare professional. If you have questions about a medical condition or this instruction, always ask your healthcare professional. [employer] disclaims any warranty or liability for your use of this information.    Learning About Depression Screening  What is depression screening?  Depression screening is a way to see if you have depression symptoms. It may be done by a doctor or counselor. It's often part of a routine checkup. That's because your mental health is just as important as your physical health.  Depression is a mental health condition that affects how you feel, think, and act. You may:  Have less energy.  Lose interest in your daily activities.  Feel sad and grouchy for a long time.  Depression is very common. It affects people of all ages.  Many things can lead to depression. Some people become depressed after they have a stroke or find out they have a major illness like cancer or heart disease. The death of a loved one or a breakup may lead to depression. It can run in families. Most experts believe that a combination of inherited genes and stressful life events can cause it.  What happens during screening?  You may be asked to fill out a form about your depression symptoms. You and the doctor will discuss your answers. The doctor may ask you more questions to learn more about how you think, act, and feel.  What happens after screening?  If you have symptoms of depression, your doctor will talk to you about your options.  Doctors usually treat depression with medicines or counseling. Often, combining the two works best. Many people don't get help because they think that they'll get over the depression on their own. But people with depression may not get better unless they get treatment.  The cause of depression is not well understood. There may be many factors  "involved. But if you have depression, it's not your fault.  A serious symptom of depression is thinking about death or suicide. If you or someone you care about talks about this or about feeling hopeless, get help right away.  It's important to know that depression can be treated. Medicine, counseling, and self-care may help.  Where can you learn more?  Go to https://www.[employer].net/patiented  Enter T185 in the search box to learn more about \"Learning About Depression Screening.\"  Current as of: June 24, 2023  Content Version: 14.2 2024 [facility].   Care instructions adapted under license by your healthcare professional. If you have questions about a medical condition or this instruction, always ask your healthcare professional. [employer] disclaims any warranty or liability for your use of this information.       "

## 2024-10-31 NOTE — TELEPHONE ENCOUNTER
Discussed results with patient on the phone. Discussed symptomatic management for pain and encouraged follow up as needed. Encouraged to take deep breaths as able.     XR Ribs & Chest Left G/E 3 Views    Result Date: 10/31/2024  XR RIBS AND CHEST LEFT 3 VIEWS 10/31/2024 11:31 AM HISTORY: Pain. COMPARISON: None.     IMPRESSION: Subtle sclerosis in the posterior aspects of the left eighth through 11th ribs, which could represent a nondisplaced fractures, however this may be artifactual in nature. Recommend clinical correlation. Streaky linear opacities at the left lung base, possibly representing atelectasis. No pneumothorax. RONNI SUMMERS MD   SYSTEM ID:  [id number]      VSS. A&O x4. Pt has infection on LLE. Dressing changed by WOC nurse. Pt is diabetic. Was changed from NPO to Mod carb diet. New insulin orders were added by provider. See MAR. Pt has a prosthetic limb. Baseline uses prosthetic and / W/c for mobility. Significant other was visiting for much of the shift.

## 2024-10-31 NOTE — PROGRESS NOTES
"Preventive Care Visit  [facility]  MONIQUE Amin CNP, Nurse Practitioner - Family  Oct 31, 2024      Assessment & Plan     Routine general medical examination at a health care facility  Healthy female exam completed today. Discussed lifestyle modifications including weight loss/ management, eating a balanced diet, and getting regular cardiovascular exercise. Discussed self breast exams and how to complete these. Pap Smear up to date2. Labs updated today. Plan yearly annual physicals or sooner as needed.    Primary hypertension  Labs updated today. Blood pressure is under good control  - BASIC METABOLIC PANEL; Future    Medication management  EKG completed for amitriptyline for psych. NSR.   - EKG 12-lead complete w/read - Clinics    Insomnia, unspecified type  Currently managed by psych. Takes 150mg trazodone and rare use of ramelteon    Rib pain on left side  No observed xray on my review of the imaging in clinic. Likely bruising. Waiting on official radiology read.   - XR Ribs & Chest Left G/E 3 Views; Future    Sleep walking  Encouraged keeping safe environment for possible sleep walking episodes.     Fungal rash of torso  Between breasts. I recommend start topical antifungal cream two times daily. If not improving follow up for further evaluation. She is in agreement with this plan.   - nystatin (MYCOSTATIN) 670617 UNIT/GM external cream; Apply topically 2 times daily.    Patient has been advised of split billing requirements and indicates understanding: Yes        BMI  Estimated body mass index is 26.35 kg/m  as calculated from the following:    Height as of this encounter: 1.626 m (5' 4.02\").    Weight as of this encounter: 69.7 kg (153 lb 9.6 oz).     Depression Screening Follow Up        10/31/2024    10:46 AM   PHQ   PHQ-9 Total Score 13   Q9: Thoughts of better off dead/self-harm past 2 weeks More than half the days         10/31/2024    10:46 AM   Last PHQ-9   1.  " Little interest or pleasure in doing things 2   2.  Feeling down, depressed, or hopeless 3   3.  Trouble falling or staying asleep, or sleeping too much 0   4.  Feeling tired or having little energy 2   5.  Poor appetite or overeating 0   6.  Feeling bad about yourself 0   7.  Trouble concentrating 2   8.  Moving slowly or restless 2   Q9: Thoughts of better off dead/self-harm past 2 weeks 2   PHQ-9 Total Score 13   Difficulty at work, home, or with people Very difficult               10/31/2024    11:51 AM   C-SSRS (Brief [place])   Within the last month, have you wished you were dead or wished you could go to sleep and not wake up? Yes   Within the last month, have you had any actual thoughts of killing yourself? No   Within the last month, have you ever done anything, started to do anything, or prepared to do anything to end your life? Yes, lifetime              Follow Up Actions Taken  Crisis resource information provided in the After Visit Summary  Patient declined referral. Has a team.     Discussed the following ways the patient can remain in a safe environment:   continue with therapies and support that she has in place.   Counseling  Appropriate preventive services were addressed with this patient via screening, questionnaire, or discussion as appropriate for fall prevention, nutrition, physical activity, Tobacco-use cessation, social engagement, weight loss and cognition.  Checklist reviewing preventive services available has been given to the patient.  Reviewed patient's diet, addressing concerns and/or questions.   Patient is at risk for social isolation and has been provided with information about the benefit of social connection.   The patient was instructed to see the dentist every 6 months.   The patient's PHQ-9 score is consistent with moderate depression. She was provided with information regarding depression.         Xochilt Schwarz is a 54 year old, presenting for the  "following:  Physical        10/31/2024    10:35 AM   Additional Questions   Roomed by Karen HUGGINS MA          HPI  *Having pain in her ribs on the left side. Patient states that she sleep walks and she had this pain when she woke up in the morning. She is not sure if she injured herself in the night. She reports that her kids mentioned that she was \"making a lot of noise\"    Recent dosage change on her amitriptyline, needs EKG for psychiatry. She has been taking 150mg of amitriptyline. Uses ramelteon very rare.    Rash that's on-going, flares up. \"Everywhere\"-does have triamcinolone cream for this but doesn't like to use. Will use cortisone cream instead    Health Care Directive  Patient does not have a Health Care Directive: Discussed advance care planning with patient; however, patient declined at this time.      10/31/2024   General Health   How would you rate your overall physical health? Good   Feel stress (tense, anxious, or unable to sleep) To some extent      (!) STRESS CONCERN      10/31/2024   Nutrition   Three or more servings of calcium each day? Yes   Diet: Regular (no restrictions)   How many servings of fruit and vegetables per day? 4 or more   How many sweetened beverages each day? (!) 3            10/31/2024   Exercise   Days per week of moderate/strenous exercise 5 days            10/31/2024   Social Factors   Frequency of gathering with friends or relatives Never   Worry food won't last until get money to buy more No   Food not last or not have enough money for food? No   Do you have housing? (Housing is defined as stable permanent housing and does not include staying ouside in a car, in a tent, in an abandoned building, in an overnight shelter, or couch-surfing.) Yes   Are you worried about losing your housing? No   Lack of transportation? No   Unable to get utilities (heat,electricity)? No      (!) SOCIAL CONNECTIONS CONCERN      10/31/2024   Fall Risk   Fallen 2 or more times in the past year? Yes  "   Trouble with walking or balance? No    Gait Speed Test (Document in seconds) 3.85   Gait Speed Test Interpretation Less than or equal to 5.00 seconds - PASS       Patient-reported          10/31/2024   Dental   Dentist two times every year? (!) NO            10/31/2024   TB Screening   Were you born outside of the [place]? No            Today's PHQ-9 Score:       10/31/2024    10:46 AM   PHQ-9 SCORE   PHQ-9 Total Score 13           10/31/2024   Substance Use   Alcohol more than 3/day or more than 7/wk Not Applicable   Do you use any other substances recreationally? No        Social History     Tobacco Use    Smoking status: Former     Current packs/day: 0.50     Types: Cigarettes, Other    Smokeless tobacco: Never    Tobacco comments:     Does JUUL e cig    Vaping Use    Vaping status: Every Day    Substances: Nicotine    Devices: Pre-filled pod   Substance Use Topics    Alcohol use: No     Alcohol/week: 0.0 standard drinks of alcohol    Drug use: No          Mammogram Screening - Mammogram every 1-2 years updated in Health Maintenance based on mutual decision making        10/31/2024   STI Screening   New sexual partner(s) since last STI/HIV test? No        History of abnormal Pap smear: No - age 30-64 HPV with reflex Pap every 5 years recommended        Latest Ref Rng & Units 9/14/2022    12:19 PM 7/9/2018    10:24 AM 7/9/2018    10:20 AM   PAP / HPV   PAP  Negative for Intraepithelial Lesion or Malignancy (NILM)      PAP (Historical)   NIL     HPV 16 DNA Negative Negative   Negative    HPV 18 DNA Negative Negative   Negative    Other HR HPV Negative Negative   Negative      ASCVD Risk   The ASCVD Risk score (Blake AMADO, et al., 2019) failed to calculate for the following reasons:    The valid HDL cholesterol range is 20 to 100 mg/dL         Reviewed and updated as needed this visit by Provider   Tobacco  Allergies  Meds  Problems  Med Hx  Surg Hx  Fam Hx            Past Medical History:   Diagnosis  Date    Abnormal Pap smear 2000     s/p LEEP - annual paps for 20 years    Arthritis     cervical neck    IUD (intrauterine device) in place 05/04/2012    mirena    Substance abuse (H) 05/03/2016     Past Surgical History:   Procedure Laterality Date    COLONOSCOPY N/A 9/17/2018    Procedure: COLONOSCOPY;  colonoscopy;  Surgeon: Roscoe Warren MD;  Location: [facility]    FUSION CERVICAL ANTERIOR ONE LEVEL  9/16/2013    Procedure: FUSION CERVICAL ANTERIOR ONE LEVEL;  ANTERIOR CERVICAL DECOMPRESSION AND FUSION C5-6 WITH INSTRUMENTATION, ALLOGRAFT AND BONE MARROW ASPIRATION OF THE LEFT ILIAC CREST ;  Surgeon: Randy Jameson MD;  Location: [facility]    GRAFT BONE FROM ILIAC CREST  9/16/2013    Procedure: GRAFT BONE FROM ILIAC CREST;;  Surgeon: Randy Jameson MD;  Location: [facility]    MT COLPOSCOPY,CERVIX W/ADJ VAG,W/LOOP BX      Description: Colposcopy With Loop Electrode Excision Of The Cervix;  Recorded: 07/09/2008;    SURGICAL HISTORY OF -       Colposcopy with loop electrode excision of the cervix    ZZHC COLP CERVIX/UPPER VAGINA W LOOP ELEC BX CERVIX       Lab work is in process  Labs reviewed in EPIC  BP Readings from Last 3 Encounters:   10/31/24 130/78   06/19/24 115/80   06/10/24 124/88    Wt Readings from Last 3 Encounters:   10/31/24 69.7 kg (153 lb 9.6 oz)   06/19/24 65.8 kg (145 lb 1 oz)   06/10/24 65.8 kg (145 lb)                  Patient Active Problem List   Diagnosis    Generalized anxiety disorder    IUD (intrauterine device) in place    Mild major depression (H)    Brachial neuritis or radiculitis    Dissociative disorder    History of loop electrical excision procedure (LEEP)    Hypertension    Insomnia, unspecified type    Alcohol use disorder    Tobacco abuse    Insomnia due to other mental disorder    Alcohol dependence in remission (H)    Recurrent major depressive disorder, remission status unspecified (H)    Sleep walking     Past Surgical History:   Procedure Laterality Date     COLONOSCOPY N/A 9/17/2018    Procedure: COLONOSCOPY;  colonoscopy;  Surgeon: Roscoe Warren MD;  Location: [facility]    FUSION CERVICAL ANTERIOR ONE LEVEL  9/16/2013    Procedure: FUSION CERVICAL ANTERIOR ONE LEVEL;  ANTERIOR CERVICAL DECOMPRESSION AND FUSION C5-6 WITH INSTRUMENTATION, ALLOGRAFT AND BONE MARROW ASPIRATION OF THE LEFT ILIAC CREST ;  Surgeon: Randy Jameson MD;  Location[facility]    GRAFT BONE FROM ILIAC CREST  9/16/2013    Procedure: GRAFT BONE FROM ILIAC CREST;;  Surgeon: Randy Jameson MD;  Location: [facility]    CA COLPOSCOPY,CERVIX W/ADJ VAG,W/LOOP BX      Description: Colposcopy With Loop Electrode Excision Of The Cervix;  Recorded: 07/09/2008;    SURGICAL HISTORY OF -       Colposcopy with loop electrode excision of the cervix    ZZHC COLP CERVIX/UPPER VAGINA W LOOP ELEC BX CERVIX         Social History     Tobacco Use    Smoking status: Former     Current packs/day: 0.50     Types: Cigarettes, Other    Smokeless tobacco: Never    Tobacco comments:     Does JUUL e cig    Substance Use Topics    Alcohol use: No     Alcohol/week: 0.0 standard drinks of alcohol     Family History   Problem Relation Age of Onset    Diabetes Maternal Grandmother     Hypertension Maternal Grandmother     Substance Abuse Maternal Grandmother     Diabetes Paternal Grandmother     Substance Abuse Paternal Grandmother     Alcohol/Drug Father         alcohol    Anxiety Disorder Father     Substance Abuse Father     Alcohol/Drug Sister         drugs    Substance Abuse Sister     Depression Sister     Anxiety Disorder Sister     Anxiety Disorder Mother     Substance Abuse Mother     Depression Mother     Substance Abuse Maternal Grandfather     Substance Abuse Paternal Grandfather     Anxiety Disorder Daughter     Urticaria Daughter         chronic idiopathic         Current Outpatient Medications   Medication Sig Dispense Refill    amitriptyline (ELAVIL) 50 MG tablet Take 2 tablets (100 mg) by mouth At Bedtime  0     "nystatin (MYCOSTATIN) 237060 UNIT/GM external cream Apply topically 2 times daily. 30 g 0    ramelteon (ROZEREM) 8 MG tablet Take 8 mg by mouth at bedtime      QUEtiapine (SEROQUEL) 25 MG tablet TAKE 1 TABLET (25 MG) BY MOUTH 3 TIMES DAILY AS NEEDED (ANXIETY/PANIC) AND 1-4 TABS AT NIGHT FOR SLEEP. (Patient not taking: Reported on 10/31/2024) 90 tablet 0    triamcinolone (KENALOG) 0.1 % external cream Apply topically 2 times daily For up to 2 weeks then take a week off if using regularly and restart if needed again for 2 weeks (Patient not taking: Reported on 10/31/2024) 453.6 g 1     No Known Allergies  Recent Labs   Lab Test 09/14/22  1225 03/31/21  0840 03/15/21  1206 07/09/18  1049   LDL 41  --   --  37     --   --  125   TRIG 93  --   --  24   ALT  --  85*  --   --    CR  --  0.81  --   --    GFRESTIMATED  --  83  --   --    GFRESTBLACK  --  >90  --   --    POTASSIUM  --  4.6  --   --    TSH  --   --  1.13  --           Review of Systems  Constitutional, neuro, ENT, endocrine, pulmonary, cardiac, gastrointestinal, genitourinary, musculoskeletal, integument and psychiatric systems are negative, except as otherwise noted.     Objective    Exam  /78   Pulse 113   Temp 98.6  F (37  C) (Tympanic)   Resp 18   Ht 1.626 m (5' 4.02\")   Wt 69.7 kg (153 lb 9.6 oz)   LMP  (LMP Unknown)   SpO2 98%   BMI 26.35 kg/m     Estimated body mass index is 26.35 kg/m  as calculated from the following:    Height as of this encounter: 1.626 m (5' 4.02\").    Weight as of this encounter: 69.7 kg (153 lb 9.6 oz).    Physical Exam  GENERAL: alert and no distress  EYES: Eyes grossly normal to inspection, PERRL and conjunctivae and sclerae normal  HENT: ear canals and TM's normal, nose and mouth without ulcers or lesions  NECK: no adenopathy, no asymmetry, masses, or scars  RESP: lungs clear to auscultation - no rales, rhonchi or wheezes  CV: regular rate and rhythm, normal S1 S2, no S3 or S4, no murmur, click or rub, no " peripheral edema  ABDOMEN: soft, nontender, no hepatosplenomegaly, no masses and bowel sounds normal  MS: no gross musculoskeletal defects noted, no edema  SKIN: no suspicious lesions or rashes  NEURO: Normal strength and tone, mentation intact and speech normal  PSYCH: mentation appears normal, affect normal/bright        Signed Electronically by: MONIQUE Amin CNP

## 2024-10-31 NOTE — LETTER
November 1, 2024      Karishma Ryan Jigar  [address] [place] [address]        Dear Ms.Jones Kimball,    We are writing to inform you of your test results.    Normal kidney and electrolyte function.     Resulted Orders   BASIC METABOLIC PANEL   Result Value Ref Range    Sodium 140 135 - 145 mmol/L    Potassium 4.3 3.4 - 5.3 mmol/L    Chloride 102 98 - 107 mmol/L    Carbon Dioxide (CO2) 29 22 - 29 mmol/L    Anion Gap 9 7 - 15 mmol/L    Urea Nitrogen 16.1 6.0 - 20.0 mg/dL    Creatinine 0.85 0.51 - 0.95 mg/dL    GFR Estimate 80 >60 mL/min/1.73m2      Comment:      eGFR calculated using 2021 CKD-EPI equation.    Calcium 9.6 8.8 - 10.4 mg/dL      Comment:      Reference intervals for this test were updated on 7/16/2024 to reflect our healthy population more accurately. There may be differences in the flagging of prior results with similar values performed with this method. Those prior results can be interpreted in the context of the updated reference intervals.    Glucose 107 (H) 70 - 99 mg/dL       If you have any questions or concerns, please call the clinic at the number listed above.       Sincerely,      MONIQUE Nunez CNP

## 2025-02-13 ENCOUNTER — LAB (OUTPATIENT)
Dept: LAB | Facility: CLINIC | Age: 55
End: 2025-02-13
Payer: COMMERCIAL

## 2025-02-13 ENCOUNTER — VIRTUAL VISIT (OUTPATIENT)
Dept: FAMILY MEDICINE | Facility: CLINIC | Age: 55
End: 2025-02-13
Payer: COMMERCIAL

## 2025-02-13 DIAGNOSIS — N30.00 ACUTE CYSTITIS WITHOUT HEMATURIA: Primary | ICD-10-CM

## 2025-02-13 DIAGNOSIS — R39.9 URINARY SYMPTOM OR SIGN: ICD-10-CM

## 2025-02-13 LAB
ALBUMIN UR-MCNC: ABNORMAL MG/DL
APPEARANCE UR: ABNORMAL
BILIRUB UR QL STRIP: NEGATIVE
COLOR UR AUTO: YELLOW
GLUCOSE UR STRIP-MCNC: NEGATIVE MG/DL
HGB UR QL STRIP: ABNORMAL
KETONES UR STRIP-MCNC: NEGATIVE MG/DL
LEUKOCYTE ESTERASE UR QL STRIP: ABNORMAL
NITRATE UR QL: NEGATIVE
PH UR STRIP: 7 [PH] (ref 5–7)
RBC #/AREA URNS AUTO: ABNORMAL /HPF
SP GR UR STRIP: 1.02 (ref 1–1.03)
SQUAMOUS #/AREA URNS AUTO: ABNORMAL /LPF
UROBILINOGEN UR STRIP-ACNC: 0.2 E.U./DL
WBC #/AREA URNS AUTO: >100 /HPF

## 2025-02-13 RX ORDER — NITROFURANTOIN 25; 75 MG/1; MG/1
100 CAPSULE ORAL 2 TIMES DAILY
Qty: 10 CAPSULE | Refills: 0 | Status: SHIPPED | OUTPATIENT
Start: 2025-02-13 | End: 2025-02-18

## 2025-02-13 NOTE — PROGRESS NOTES
Karishma is a 54 year old who is being evaluated via a billable telephone visit.    What phone number would you like to be contacted at? [phone]   How would you like to obtain your AVS? Mail a copy  Originating Location (pt. Location): Home    Distant Location (provider location):  On-site  Telephone visit completed due to the patient did not have access to video, while the distant provider did.    Assessment & Plan     Dysuria   Outside of dysuria, no other red flag symptoms including no abdominal pain, flank pain, severe fatigue, or fever/chills. Ordered UA for patient to completed, and I will plan to follow-up with her via phone later today once I have results to prescribe an appropriate antibiotic.     - UA Macroscopic with reflex to Microscopic and Culture - Lab Collect; Future    Addendum  UA showed evidence of UTI. Reviewed results with patient over the phone. She has no antibiotic allergies. Sent RX for macrobid BID x5 days. Culture pending and will call once I have these results. Encouraged azo and/or tylenol for pain. Reviewed red flag symptoms that would warrant urgent return to clinic including fever, abdominal/flank pain, or just feeling more ill/fatigued.       Subjective   Karishma is a 54 year old, presenting for the following health issues:  UTI        2/13/2025     2:12 PM   Additional Questions   Roomed by Hannah Pradhan   Accompanied by Self     History of Present Illness       Reason for visit:  UTI        Genitourinary - Female  Onset/Duration: Today Morning 2/13/2025  Description:   Painful urination (Dysuria): YES           Frequency: No  Blood in urine (Hematuria): No  Delay in urine (Hesitency): YES  Intensity: severe  Progression of Symptoms:  worsening  Accompanying Signs & Symptoms:  Fever/chills: No  Flank pain: No  Nausea and vomiting: No  Vaginal symptoms: Burning. No vaginal discharge or abnormal bleeding.   Abdominal/Pelvic Pain: No  History:   History of frequent UTI s: YES- It's been a  "long time   History of kidney stones: No  Sexually Active: No  Possibility of pregnancy: No  Precipitating or alleviating factors: None  Therapies tried and outcome: Increase fluid intake                Objective    Vitals - Patient Reported  Weight (Patient Reported): 68 kg (150 lb)  Height (Patient Reported): 162.6 cm (5' 4\")  BMI (Based on Pt Reported Ht/Wt): 25.75  Pain Score: Severe Pain (10)        Physical Exam   General: Alert and no distress //Respiratory: No audible wheeze, cough, or shortness of breath // Psychiatric:  Appropriate affect, tone, and pace of words            Phone call duration: 10 minutes  Signed Electronically by: Salvatore Franco PA-C    "

## 2025-02-15 LAB
BACTERIA UR CULT: ABNORMAL
BACTERIA UR CULT: ABNORMAL

## 2025-02-17 ENCOUNTER — TELEPHONE (OUTPATIENT)
Dept: FAMILY MEDICINE | Facility: CLINIC | Age: 55
End: 2025-02-17
Payer: COMMERCIAL

## 2025-02-17 NOTE — TELEPHONE ENCOUNTER
----- Message from Salvatore Franco sent at 2/16/2025 10:07 PM CST -----  Please contact patient.     Urine culture confirms UTI and shows susceptibility to antibiotic. Can you see if she is feeling better? Should continue all meds prescribed. If flank pain, fever/chills, or feeling unwell, needs to be further evaluated.     Salvatore

## 2025-02-17 NOTE — TELEPHONE ENCOUNTER
Left message to call back and ask to speak with an available nurse.    JEFF SamayoaN, RN-BC  [facility]

## 2025-02-25 NOTE — TELEPHONE ENCOUNTER
Attempt #2.     Writer called and left message on patient's voicemail to call back and speak with a triage nurse.    JEFF RobertsonN RN  [facility]

## 2025-02-25 NOTE — TELEPHONE ENCOUNTER
"I apologize! I didn't see the \"no symptoms today\". No call back is necessary. Thank you for catching this!     Salvatore  "

## 2025-02-25 NOTE — TELEPHONE ENCOUNTER
Can you clarify with patient if symptoms have improved? If so, then I'd be happy to extend PO antibiotic course to 7 days total. Otherwise, monitor for symptoms resolution, and if not better, she could follow-up with me to discuss alternative treatment (phone, video, MyChart,  evisit, or nurse call is ok for this). If she develops fever, chills, flank/abdominal pain, or feels unwell, needs to go to ER or urgent care.     Thanks!   Salvatore

## 2025-02-25 NOTE — TELEPHONE ENCOUNTER
Pt returns call. Is still on 2nd abx and reports that yesterday she still had some burning with urination but today she has no symptoms. Still has 3 days left of medication.   Denies flank pain/abd pain/fever, chills or frequency.     Adv pt to call back with any new or returning symptoms.     Sending to provider as virginia Sanchez RN

## 2025-02-25 NOTE — TELEPHONE ENCOUNTER
"Per RN's note below at 9:51 am today  \"yesterday she still had some burning with urination but today she has no symptoms \"    Did you still need triage to call pt back again?    Maryellen CONNOLLY RN, BSN  [facility]         "

## 2025-07-14 ENCOUNTER — TELEPHONE (OUTPATIENT)
Dept: FAMILY MEDICINE | Facility: CLINIC | Age: 55
End: 2025-07-14
Payer: COMMERCIAL

## 2025-07-14 ENCOUNTER — OFFICE VISIT (OUTPATIENT)
Dept: URGENT CARE | Facility: URGENT CARE | Age: 55
End: 2025-07-14
Payer: COMMERCIAL

## 2025-07-14 ENCOUNTER — ANCILLARY PROCEDURE (OUTPATIENT)
Dept: GENERAL RADIOLOGY | Facility: CLINIC | Age: 55
End: 2025-07-14
Payer: COMMERCIAL

## 2025-07-14 VITALS
TEMPERATURE: 98.6 F | RESPIRATION RATE: 18 BRPM | BODY MASS INDEX: 25.39 KG/M2 | HEART RATE: 92 BPM | DIASTOLIC BLOOD PRESSURE: 102 MMHG | OXYGEN SATURATION: 97 % | SYSTOLIC BLOOD PRESSURE: 157 MMHG | WEIGHT: 148 LBS

## 2025-07-14 DIAGNOSIS — R07.81 RIB PAIN: ICD-10-CM

## 2025-07-14 DIAGNOSIS — S22.31XA CLOSED FRACTURE OF ONE RIB OF RIGHT SIDE, INITIAL ENCOUNTER: Primary | ICD-10-CM

## 2025-07-14 PROCEDURE — 99214 OFFICE O/P EST MOD 30 MIN: CPT

## 2025-07-14 PROCEDURE — 71046 X-RAY EXAM CHEST 2 VIEWS: CPT | Mod: TC | Performed by: RADIOLOGY

## 2025-07-14 RX ORDER — MELOXICAM 15 MG/1
15 TABLET ORAL DAILY
Qty: 14 TABLET | Refills: 0 | Status: SHIPPED | OUTPATIENT
Start: 2025-07-14 | End: 2025-07-28

## 2025-07-14 RX ORDER — OXYCODONE HYDROCHLORIDE 5 MG/1
5 TABLET ORAL EVERY 6 HOURS PRN
Qty: 12 TABLET | Refills: 0 | Status: SHIPPED | OUTPATIENT
Start: 2025-07-14 | End: 2025-07-17

## 2025-07-14 NOTE — PATIENT INSTRUCTIONS
Take mobic and oxycodone as needed.   Use the incentive spirometer to help you take deep breaths.   If you develop a fever, cough, feeling ill, go to emergency department.

## 2025-07-14 NOTE — TELEPHONE ENCOUNTER
S-(situation): Patient calling with concerns of R sided rib pain.    B-(background): Patient had a rib fracture 20 years ago.    A-(assessment): Rib pain has been occurring since Thursday morning. States she cannot sneeze, turn a door knob, or lift. Patient states she rides bike, kickboxing, and rowing and thinks this may have exacerbated the pain. Patient denies worsening symptoms. bet    R-(recommendations): Patient advised to seek care at Urgent Care for imaging. Patient voiced understanding.    Issa LY RN  [facility]

## 2025-07-14 NOTE — PROGRESS NOTES
"Assessment & Plan     Closed fracture of one rib of right side, initial encounter  Rib pain  - meloxicam (MOBIC) 15 MG tablet  Dispense: 14 tablet; Refill: 0  - oxyCODONE (ROXICODONE) 5 MG tablet  Dispense: 12 tablet; Refill: 0  - XR Chest 2 Views   54-year-old with history of sleepwalking and previous arm fracture that occurred while sleeping presenting with right rib pain that began 7/10 now worsening. Daughter did hear a loud noise sounding like a fall at night. She awoke with pain. Pain worsened with inspiration, coughing, talking. Slightly improving today. CXR shows new Linear opacities in the right lung base are may be due to atelectasis. Possible additional acute mildly displaced fracture of the right posterior ninth rib with evaluation limited by superimposed linear opacities.  \"Consider chest CT on dedicated rib radiographs for better evaluation\"     Clinical picture is consistent with rib fracture. Vitals stable, no hypoxia. Afebrile no cough.  At this time will pain and atelectasis and have patient follow-up with primary care provider in about 1 week.  Need for follow up imaging can be discussed at that time. Discussed precautions for signs of pneumonia and reasons to present to ED including new fever, shortness of breath, worsening pain.  Pain management as above and provided incentive spirometer for her to use at home.     Return in about 1 week (around 7/21/2025) for Follow up with primary care physician to re-evaluate rib pain and consider a CT scan if neeeded.    Deann Johnson MD  [facility] [place]    Xochilt Schwarz is a 54 year old female who presents to clinic today for the following health issues:  Chief Complaint   Patient presents with    Rib Pain     Right side rib. Started on Thursday. Today put bio freeze on and daughter said it looked like starting bruise. Had an old injury before. But says does sleep walk and daughter did hear a thump one night. Usually can't " do much when starts hurting.          7/14/2025     3:52 PM   Additional Questions   Roomed by Shamika ZAPATA     HPI  Woke up on Thursday, could hardly move. Right side rib pain.   No known injury. Does sleep walk and has a history of arm broken while sleep walking.   Daughter thought she heard a crash. Does have a psychiatrist and it is mostly under control    Sometimes hurts to cough, talk. Was sweating and nausea due to the pain. Works as a massage therapy.   Starting to feel a little better today, more mobility now but laughing still hurts. Pain with deep breaths.   Took daughter's tylenol with oxycodone twice. Didn't do much.   Ibuprofen didn't help.       Review of Systems        Objective    BP (!) 157/102   Pulse 92   Temp 98.6  F (37  C) (Tympanic)   Resp 18   Wt 67.1 kg (148 lb)   LMP  (LMP Unknown)   SpO2 97%   BMI 25.39 kg/m    Physical Exam   GENERAL: alert and no distress  NECK: no adenopathy, no asymmetry, masses, or scars  RESP: lungs clear to auscultation - no rales, rhonchi or wheezes  CV: regular rate and rhythm, normal S1 S2, no S3 or S4, no murmur, click or rub, no peripheral edema  ABDOMEN: soft, nontender, no hepatosplenomegaly, no masses and bowel sounds normal  MS: Significant tenderness along the right inferior ribs anteriorly and posteriorly.  Mild bruising in posterior inferior rib margin.

## 2025-07-14 NOTE — PROGRESS NOTES
Urgent Care Clinic Visit    Chief Complaint   Patient presents with    Rib Pain     Right side rib. Started on Thursday. Today put bio freeze on and daughter said it looked like starting bruise. Had an old injury before. But says does sleep walk and daughter did hear a thump one night. Usually can't do much when starts hurting.               7/14/2025     3:52 PM   Additional Questions   Roomed by Shamika ZAPATA

## 2025-07-22 ENCOUNTER — OFFICE VISIT (OUTPATIENT)
Dept: FAMILY MEDICINE | Facility: CLINIC | Age: 55
End: 2025-07-22
Payer: COMMERCIAL

## 2025-07-22 VITALS
HEART RATE: 94 BPM | DIASTOLIC BLOOD PRESSURE: 102 MMHG | TEMPERATURE: 98.5 F | WEIGHT: 149.2 LBS | OXYGEN SATURATION: 96 % | HEIGHT: 64 IN | RESPIRATION RATE: 16 BRPM | SYSTOLIC BLOOD PRESSURE: 138 MMHG | BODY MASS INDEX: 25.47 KG/M2

## 2025-07-22 DIAGNOSIS — R10.11 RUQ DISCOMFORT: Primary | ICD-10-CM

## 2025-07-22 DIAGNOSIS — S22.31XD CLOSED FRACTURE OF ONE RIB OF RIGHT SIDE WITH ROUTINE HEALING, SUBSEQUENT ENCOUNTER: ICD-10-CM

## 2025-07-22 PROCEDURE — 99213 OFFICE O/P EST LOW 20 MIN: CPT | Performed by: NURSE PRACTITIONER

## 2025-07-22 ASSESSMENT — PATIENT HEALTH QUESTIONNAIRE - PHQ9
SUM OF ALL RESPONSES TO PHQ QUESTIONS 1-9: 8
SUM OF ALL RESPONSES TO PHQ QUESTIONS 1-9: 8
10. IF YOU CHECKED OFF ANY PROBLEMS, HOW DIFFICULT HAVE THESE PROBLEMS MADE IT FOR YOU TO DO YOUR WORK, TAKE CARE OF THINGS AT HOME, OR GET ALONG WITH OTHER PEOPLE: SOMEWHAT DIFFICULT

## 2025-07-22 NOTE — PROGRESS NOTES
"  Assessment & Plan     RUQ discomfort  Eval for hernia.   - CT Abdomen Pelvis w Contrast; Future    Closed fracture of one rib of right side with routine healing, subsequent encounter  Healing without complications.  MED REC REQUIRED  Post Medication Reconciliation Status: patient was not discharged from an inpatient facility or TCU  BMI  Estimated body mass index is 25.61 kg/m  as calculated from the following:    Height as of this encounter: 1.626 m (5' 4\").    Weight as of this encounter: 67.7 kg (149 lb 3.2 oz).   Weight management plan: Discussed healthy diet and exercise guidelines    Depression Screening Follow Up        7/22/2025     8:45 AM   PHQ   PHQ-9 Total Score 8    Q9: Thoughts of better off dead/self-harm past 2 weeks Several days    F/U: Thoughts of suicide or self-harm No   F/U: Safety concerns No       Proxy-reported                 10/31/2024    11:51 AM   C-SSRS (Brief [place])   Within the last month, have you wished you were dead or wished you could go to sleep and not wake up? Yes   Within the last month, have you had any actual thoughts of killing yourself? No   Within the last month, have you ever done anything, started to do anything, or prepared to do anything to end your life? Yes, lifetime           Follow Up Actions Taken  Referred patient back to mental health provider    Discussed the following ways the patient can remain in a safe environment:  remove alcohol and be around others      Subjective   Karishma is a 54 year old, presenting for the following health issues:  ER F/U        7/22/2025     8:46 AM   Additional Questions   Roomed by Maru GONZALES        ED/UC Followup:    Facility:  [facility]   Date of visit: 7/14/25  Reason for visit: closed fracture of rib on right side  Current Status: better, pain is minimal at 1/10. No longer needing medication for pain. Has had a suspected hernia to right abdomen for last 6 years, has not been evaluated for it. Was causing pain " "after the rib fracture. Is improved now.         Review of Systems  Constitutional, HEENT, cardiovascular, pulmonary, gi and gu systems are negative, except as otherwise noted.      Objective    BP (!) 138/102   Pulse 94   Temp 98.5  F (36.9  C) (Tympanic)   Resp 16   Ht 1.626 m (5' 4\")   Wt 67.7 kg (149 lb 3.2 oz)   LMP  (LMP Unknown)   SpO2 96%   BMI 25.61 kg/m    Body mass index is 25.61 kg/m .  Physical Exam   GENERAL: alert and no distress  RESP: lungs clear to auscultation - no rales, rhonchi or wheezes  CV: regular rate and rhythm, normal S1 S2, no S3 or S4, no murmur, click or rub, no peripheral edema  ABDOMEN: soft, nontender, no palpable or pulsatile masses, and no definitive hernia palpated at site.  MS: no gross musculoskeletal defects noted, no edema, tender to palpation over right anterior lower rib            Signed Electronically by: MONIQUE Cruz CNP    "

## 2025-08-12 ENCOUNTER — TELEPHONE (OUTPATIENT)
Dept: FAMILY MEDICINE | Facility: CLINIC | Age: 55
End: 2025-08-12
Payer: COMMERCIAL

## 2025-08-28 ENCOUNTER — PATIENT OUTREACH (OUTPATIENT)
Dept: CARE COORDINATION | Facility: CLINIC | Age: 55
End: 2025-08-28
Payer: COMMERCIAL

## (undated) RX ORDER — ONDANSETRON 2 MG/ML
INJECTION INTRAMUSCULAR; INTRAVENOUS
Status: DISPENSED
Start: 2018-09-17

## (undated) RX ORDER — LIDOCAINE HYDROCHLORIDE 10 MG/ML
INJECTION, SOLUTION EPIDURAL; INFILTRATION; INTRACAUDAL; PERINEURAL
Status: DISPENSED
Start: 2018-09-17